# Patient Record
Sex: FEMALE | Race: BLACK OR AFRICAN AMERICAN | Employment: PART TIME | URBAN - METROPOLITAN AREA
[De-identification: names, ages, dates, MRNs, and addresses within clinical notes are randomized per-mention and may not be internally consistent; named-entity substitution may affect disease eponyms.]

---

## 2017-01-09 ENCOUNTER — HOSPITAL ENCOUNTER (OUTPATIENT)
Dept: MAMMOGRAPHY | Age: 67
Discharge: HOME | End: 2017-01-09
Attending: FAMILY MEDICINE
Payer: MEDICARE

## 2017-01-09 ENCOUNTER — HOSPITAL ENCOUNTER (OUTPATIENT)
Dept: ULTRASOUND IMAGING | Age: 67
Discharge: HOME | End: 2017-01-09
Attending: FAMILY MEDICINE
Payer: MEDICARE

## 2017-01-09 DIAGNOSIS — R92.8 ABNORMAL MAMMOGRAM: ICD-10-CM

## 2017-01-09 PROCEDURE — 77065 DX MAMMO INCL CAD UNI: CPT

## 2017-01-09 PROCEDURE — 76642 ULTRASOUND BREAST LIMITED: CPT

## 2017-01-11 ENCOUNTER — TELEPHONE (OUTPATIENT)
Dept: FAMILY MEDICINE CLINIC | Age: 67
End: 2017-01-11

## 2017-01-11 ENCOUNTER — OFFICE VISIT (OUTPATIENT)
Dept: FAMILY MEDICINE CLINIC | Age: 67
End: 2017-01-11

## 2017-01-11 VITALS
DIASTOLIC BLOOD PRESSURE: 90 MMHG | OXYGEN SATURATION: 99 % | BODY MASS INDEX: 26.4 KG/M2 | HEART RATE: 79 BPM | SYSTOLIC BLOOD PRESSURE: 144 MMHG | RESPIRATION RATE: 16 BRPM | HEIGHT: 63 IN | TEMPERATURE: 98 F | WEIGHT: 149 LBS

## 2017-01-11 DIAGNOSIS — T78.3XXD ANGIOEDEMA OF LIPS, SUBSEQUENT ENCOUNTER: Primary | ICD-10-CM

## 2017-01-11 RX ORDER — METOPROLOL SUCCINATE 25 MG/1
25 TABLET, EXTENDED RELEASE ORAL DAILY
Qty: 30 TAB | Refills: 2 | Status: SHIPPED | OUTPATIENT
Start: 2017-01-11 | End: 2017-04-27 | Stop reason: SDUPTHER

## 2017-01-11 RX ORDER — PREDNISONE 20 MG/1
60 TABLET ORAL
Qty: 15 TAB | Refills: 0 | Status: SHIPPED | OUTPATIENT
Start: 2017-01-11 | End: 2017-01-16

## 2017-01-11 NOTE — TELEPHONE ENCOUNTER
Pt states her lip is swollen and she has a rash. And she is reacting to Norvasc. Pt requesting medication change. Pt denies SOB or swollen tongue. States she has taken claritin  for reaction. Benadryl makes her sleepy.  Appt schedule today

## 2017-01-11 NOTE — PATIENT INSTRUCTIONS
Angioedema: Care Instructions  Your Care Instructions  Angioedema is an allergic reaction. It causes swelling and welts in the deep layers of the skin. Angioedema can sometimes occur along with hives. Hives are an allergic reaction in the outer layers of the skin. Angioedema can range from mild to severe. Painful welts can develop on the face. Angioedema can also occur on other parts of the body. In severe cases, the inside of the throat can swell and make it hard to breathe. Many things can cause this condition, including foods, insect bites, and medicines (such as aspirin and some blood pressure medicines). It also can run in families. Sometimes you may know what caused the reaction, but other times you may not know. Follow-up care is a key part of your treatment and safety. Be sure to make and go to all appointments, and call your doctor if you are having problems. It's also a good idea to know your test results and keep a list of the medicines you take. How can you care for yourself at home? · Take your medicines exactly as prescribed. Call your doctor if you think you are having a problem with your medicine. You will get more details on the specific medicines your doctor prescribes. Some medicines used to treat angioedema can make you too sleepy to drive safely. Do not drive if you take medicine that may make you sleepy. · Avoid foods or medicine that may have triggered the swelling. · For comfort:  ¨ Try taking a cool bath. Or place a cool, wet towel on the swollen area. ¨ Avoid hot baths and showers. ¨ Wear loose clothing. · Your doctor may prescribe a shot of epinephrine to carry with you in case you have a severe reaction. Learn how to give yourself the shot and keep it with you at all times. Make sure it has not . When should you call for help? Give an epinephrine shot if:  · You think you are having a severe allergic reaction.   · You have symptoms in more than one body area, such as mild nausea and an itchy mouth. After giving an epinephrine shot call 911, even if you feel better. Call 911 if:  · You have symptoms of a severe allergic reaction. These may include:  ¨ Sudden raised, red areas (hives) all over your body. ¨ Swelling of the throat, mouth, lips, or tongue. ¨ Trouble breathing. ¨ Passing out (losing consciousness). Or you may feel very lightheaded or suddenly feel weak, confused, or restless. · You have been given an epinephrine shot, even if you feel better. Call your doctor now or seek immediate medical care if:  · You have symptoms of an allergic reaction, such as:  ¨ A rash or hives (raised, red areas on the skin). ¨ Itching. ¨ Swelling. ¨ Belly pain, nausea, or vomiting. Watch closely for changes in your health, and be sure to contact your doctor if:  · You do not get better as expected. Where can you learn more? Go to http://charles-kristin.info/. Enter P166 in the search box to learn more about \"Angioedema: Care Instructions. \"  Current as of: February 12, 2016  Content Version: 11.1  © 8986-4752 [employer]. Care instructions adapted under license by [employer] (which disclaims liability or warranty for this information). If you have questions about a medical condition or this instruction, always ask your healthcare professional. [address] any warranty or liability for your use of this information.

## 2017-01-11 NOTE — PROGRESS NOTES
Amanda Duckworth  77 y.o. female  1950  [address] [facility]  [id number]   [address]: Progress Note  Ngozi Ramirez MD       Encounter Date: 1/11/2017    Chief Complaint   Patient presents with    Allergic Reaction    Lip Swelling     History of Present Illness   Amanda Duckworth is a 77 y.o. female who presents to clinic today for follow-up from last appointment. Had developed swelling of lip after swtich from brand to generic Benicar. Stopped the Benicar and started Norvasc. No improvement in swelling over that time, infact, maybe a little worse. Denies wheezing, coughing, dyspnea, edema of tongue, blisters, etc.    Taking 10mg loratidine daily  Review of Systems   Review of Systems   Constitutional: Negative for fever. HENT:        Lip swelling   Respiratory: Negative for cough, sputum production, shortness of breath and wheezing. Endo/Heme/Allergies: Positive for environmental allergies. All other systems reviewed and are negative. Vitals/Objective:     Vitals:    01/11/17 1406   BP: 144/90   Pulse: 79   Resp: 16   Temp: 98 °F (36.7 °C)   TempSrc: Oral   SpO2: 99%   Weight: 149 lb (67.6 kg)   Height: 5' 3\" (1.6 m)     Body mass index is 26.39 kg/(m^2). Physical Exam   HENT:   Right Ear: Tympanic membrane normal.   Left Ear: Tympanic membrane normal.   Nose: Nose normal.   Lower lip edema   Cardiovascular: Normal rate, regular rhythm, normal heart sounds and normal pulses. PMI is not displaced. Exam reveals no gallop. No murmur heard. Pulmonary/Chest: Effort normal and breath sounds normal. No accessory muscle usage. No respiratory distress. She has no decreased breath sounds. She has no wheezes. She has no rhonchi. Assessment and Plan:   1. Angioedema of lips, subsequent encounter  Increase loratidine to 20mg, take prednisone burst and stop norvasc. Start toprol XL. STOP all ASA or NSAIDS.   Refer to allergist due to persisent angioedema. May need to rule o  - metoprolol succinate (TOPROL-XL) 25 mg XL tablet; Take 1 Tab by mouth daily. Dispense: 30 Tab; Refill: 2  - predniSONE (DELTASONE) 20 mg tablet; Take 3 Tabs by mouth daily (with breakfast) for 5 days. Dispense: 15 Tab; Refill: 0  - REFERRAL TO ALLERGY    I have discussed the diagnosis with the patient and the intended plan as seen in the above orders. she has expressed understanding. The patient has received an after-visit summary and questions were answered concerning future plans. I have discussed medication side effects and warnings with the patient as well. Follow-up Disposition:  Return in about 1 week (around 1/18/2017), or if symptoms worsen or fail to improve, for Follow-up on Angioedema. Electronically Signed: Chaka Milligan MD     History   Patients past medical, surgical and family histories were reviewed and updated. Past Medical History   Diagnosis Date    Diabetes ([facility] [address]     Gallstone     Hypertension      Past Surgical History   Procedure Laterality Date    Hx gyn  1984     hysterectomy - Fibroids     Family History   Problem Relation Age of Onset    Bleeding Prob Mother      Anyerism- Brain    Diabetes Father     Other Father      hep C    Stroke Sister     No Known Problems Brother     No Known Problems Brother     No Known Problems Brother     No Known Problems Brother     No Known Problems Brother     Lupus Sister     No Known Problems Sister     No Known Problems Sister     No Known Problems Sister     Migraines Daughter     No Known Problems Daughter      Social History     Social History    Marital status:      Spouse name: N/A    Number of children: N/A    Years of education: N/A     Occupational History    Not on file.      Social History Main Topics    Smoking status: Never Smoker    Smokeless tobacco: Not on file    Alcohol use No    Drug use: No    Sexual activity: Not Currently     Birth control/ protection: Surgical     Other Topics Concern    Not on file     Social History Narrative            Current Medications/Allergies     Current Outpatient Prescriptions   Medication Sig Dispense Refill    amLODIPine (NORVASC) 5 mg tablet Take 1 Tab by mouth daily. 30 Tab 1    metFORMIN (GLUCOPHAGE) 500 mg tablet Take 1 Tab by mouth two (2) times daily (with meals). 60 Tab 1    olmesartan (BENICAR) 40 mg tablet Take  by mouth daily.        Allergies   Allergen Reactions    Penicillins Rash and Swelling

## 2017-01-11 NOTE — LETTER
1/12/2017 1:44 PM 
 
RE:    Jason Au  
[address] [facility] [phone] Thank you for agreeing to see Rita Dallas I am referring my patient to you for evaluation of continued angioedema despite stopping ARB. Clinically stable. . Please see her pertinent patient information below. Problem List:    
Patient Active Problem List  
 Diagnosis Date Noted  Essential hypertension 11/07/2016  Controlled type 2 diabetes mellitus without complication, without long-term current use of insulin ([facility] 11/07/2016 Medical History:    
Past Medical History Diagnosis Date  Diabetes ([facility]  Gallstone  Hypertension Allergies: Allergies Allergen Reactions  Penicillins Rash and Swelling Medications:    
Current Outpatient Prescriptions Medication Sig  
 metoprolol succinate (TOPROL-XL) 25 mg XL tablet Take 1 Tab by mouth daily.  predniSONE (DELTASONE) 20 mg tablet Take 3 Tabs by mouth daily (with breakfast) for 5 days. No current facility-administered medications for this visit. I appreciate your assistance in Ms. Sawant's care  and look forward to your findings and recommendations.  
 
 
 
Sincerely, 
 
 
Tyra Romero MD

## 2017-01-25 ENCOUNTER — HOSPITAL ENCOUNTER (OUTPATIENT)
Dept: MAMMOGRAPHY | Age: 67
Discharge: HOME | End: 2017-01-25
Attending: RADIOLOGY
Payer: MEDICARE

## 2017-01-25 ENCOUNTER — TELEPHONE (OUTPATIENT)
Dept: FAMILY MEDICINE CLINIC | Age: 67
End: 2017-01-25

## 2017-01-25 ENCOUNTER — HOSPITAL ENCOUNTER (OUTPATIENT)
Dept: ULTRASOUND IMAGING | Age: 67
Discharge: HOME | End: 2017-01-25
Attending: FAMILY MEDICINE
Payer: MEDICARE

## 2017-01-25 DIAGNOSIS — N63.0 BREAST MASS: ICD-10-CM

## 2017-01-25 DIAGNOSIS — N63.0 BREAST MASS: Primary | ICD-10-CM

## 2017-01-25 PROCEDURE — 88360 TUMOR IMMUNOHISTOCHEM/MANUAL: CPT | Performed by: RADIOLOGY

## 2017-01-25 PROCEDURE — 19083 BX BREAST 1ST LESION US IMAG: CPT

## 2017-01-25 PROCEDURE — 77030003666 HC NDL SPINAL BD -A

## 2017-01-25 PROCEDURE — 88305 TISSUE EXAM BY PATHOLOGIST: CPT | Performed by: RADIOLOGY

## 2017-01-25 PROCEDURE — 77030010507 HC ADH SKN DERMBND J&J -B

## 2017-01-25 PROCEDURE — 77030003497 HC NDL BIOP TISS BARD -B

## 2017-01-25 PROCEDURE — 77030003492

## 2017-01-25 PROCEDURE — 88365 INSITU HYBRIDIZATION (FISH): CPT | Performed by: RADIOLOGY

## 2017-01-25 PROCEDURE — 77065 DX MAMMO INCL CAD UNI: CPT

## 2017-01-25 PROCEDURE — 74011000250 HC RX REV CODE- 250: Performed by: RADIOLOGY

## 2017-01-25 RX ORDER — LIDOCAINE HYDROCHLORIDE AND EPINEPHRINE 10; 10 MG/ML; UG/ML
1.5 INJECTION, SOLUTION INFILTRATION; PERINEURAL ONCE
Status: COMPLETED | OUTPATIENT
Start: 2017-01-25 | End: 2017-01-25

## 2017-01-25 RX ADMIN — LIDOCAINE HYDROCHLORIDE,EPINEPHRINE BITARTRATE 10 MG: 10; .01 INJECTION, SOLUTION INFILTRATION; PERINEURAL at 09:45

## 2017-01-25 RX ADMIN — SODIUM BICARBONATE 4 ML: 0.2 INJECTION, SOLUTION INTRAVENOUS at 09:43

## 2017-01-25 NOTE — TELEPHONE ENCOUNTER
Call transferred from [address] [place] [address] [place]:    Kristi Back from Ultrasound Dept at [facility] called to say he needs an order as the appointment of today was scheduled verbally from the office. Nurse supervisor took the call.

## 2017-01-30 ENCOUNTER — TELEPHONE (OUTPATIENT)
Dept: FAMILY MEDICINE CLINIC | Age: 67
End: 2017-01-30

## 2017-01-30 NOTE — TELEPHONE ENCOUNTER
Please notify the patient that I have the results of her breast biopsy results and request she schedule an appointment this week to review them.

## 2017-01-31 ENCOUNTER — TELEPHONE (OUTPATIENT)
Dept: FAMILY MEDICINE CLINIC | Age: 67
End: 2017-01-31

## 2017-01-31 ENCOUNTER — OFFICE VISIT (OUTPATIENT)
Dept: FAMILY MEDICINE CLINIC | Age: 67
End: 2017-01-31

## 2017-01-31 VITALS
TEMPERATURE: 98.7 F | BODY MASS INDEX: 26.75 KG/M2 | WEIGHT: 151 LBS | SYSTOLIC BLOOD PRESSURE: 154 MMHG | DIASTOLIC BLOOD PRESSURE: 78 MMHG | HEIGHT: 63 IN | OXYGEN SATURATION: 98 % | HEART RATE: 81 BPM

## 2017-01-31 DIAGNOSIS — C50.919 INVASIVE CARCINOMA OF BREAST (HCC): Primary | ICD-10-CM

## 2017-01-31 DIAGNOSIS — E11.9 TYPE 2 DIABETES MELLITUS WITHOUT COMPLICATION, WITHOUT LONG-TERM CURRENT USE OF INSULIN (HCC): ICD-10-CM

## 2017-01-31 DIAGNOSIS — I10 ESSENTIAL HYPERTENSION: ICD-10-CM

## 2017-01-31 RX ORDER — METFORMIN HYDROCHLORIDE 500 MG/1
TABLET ORAL 2 TIMES DAILY WITH MEALS
COMMUNITY
End: 2017-02-06 | Stop reason: SDUPTHER

## 2017-01-31 NOTE — PROGRESS NOTES
Chief Complaint   Patient presents with    Elevated Blood Pressure     1. Have you been to the ER, urgent care clinic since your last visit? Hospitalized since your last visit? No    2. Have you seen or consulted any other health care providers outside of the [facility] since your last visit? Include any pap smears or colon screening.   mammogram--biopsy done last week

## 2017-01-31 NOTE — PATIENT INSTRUCTIONS
Breast Cancer: Care Instructions  Your Care Instructions  Breast cancer occurs when abnormal cells grow out of control in the breast. These cancer cells can spread within the breast, to nearby lymph nodes and other tissues, and to other parts of the body. Being treated for cancer can weaken your body, and you may feel very tired. Get the rest your body needs so you can feel better. Finding out that you have cancer is scary. You may feel many emotions and may need some help coping. Seek out family, friends, and counselors for support. You also can do things at home to make yourself feel better while you go through treatment. Call the [employer] (9-616.999.2332) or visit its website at [employer] for more information. Follow-up care is a key part of your treatment and safety. Be sure to make and go to all appointments, and call your doctor if you are having problems. It's also a good idea to know your test results and keep a list of the medicines you take. How can you care for yourself at home? · Take your medicines exactly as prescribed. Call your doctor if you think you are having a problem with your medicine. You may get medicine for nausea and vomiting if you have these side effects. · Follow your doctor's instructions to relieve pain. Pain from cancer and surgery can almost always be controlled. Use pain medicine when you first notice pain, before it becomes severe. · Eat healthy food. If you do not feel like eating, try to eat food that has protein and extra calories to keep up your strength and prevent weight loss. Drink liquid meal replacements for extra calories and protein. Try to eat your main meal early. · Get some physical activity every day, but do not get too tired. Keep doing the hobbies you enjoy as your energy allows. · Do not smoke. Smoking can make your cancer worse. If you need help quitting, talk to your doctor about stop-smoking programs and medicines.  These can increase your chances of quitting for good. · Take steps to control your stress and workload. Learn relaxation techniques. ¨ Share your feelings. Stress and tension affect our emotions. By expressing your feelings to others, you may be able to understand and cope with them. ¨ Consider joining a support group. Talking about a problem with your spouse, a good friend, or other people with similar problems is a good way to reduce tension and stress. ¨ Express yourself through art. Try writing, crafts, dance, or art to relieve stress. Some dance, writing, or art groups may be available just for people who have cancer. ¨ Be kind to your body and mind. Getting enough sleep, eating a healthy diet, and taking time to do things you enjoy can contribute to an overall feeling of balance in your life and can help reduce stress. ¨ Get help if you need it. Discuss your concerns with your doctor or counselor. · If you are vomiting or have diarrhea:  ¨ Drink plenty of fluids (enough so that your urine is light yellow or clear like water) to prevent dehydration. Choose water and other caffeine-free clear liquids. If you have kidney, heart, or liver disease and have to limit fluids, talk with your doctor before you increase the amount of fluids you drink. ¨ When you are able to eat, try clear soups, mild foods, and liquids until all symptoms are gone for 12 to 48 hours. Other good choices include dry toast, crackers, cooked cereal, and gelatin dessert, such as Jell-O.  · If you have not already done so, prepare a list of advance directives. Advance directives are instructions to your doctor and family members about what kind of care you want if you become unable to speak or express yourself. When should you call for help? Call your doctor now or seek immediate medical care if:  · You have a fever. · Any part of your breast becomes red, tender, swollen, or hot.   · You have pain, redness, or swelling in the arm on the same side as your breast cancer. Watch closely for changes in your health, and be sure to contact your doctor if:  · You have pain that is not controlled by medicine. · You have nausea or vomiting. · You are constipated or have diarrhea. Where can you learn more? Go to http://charles-kristin.info/. Enter V321 in the search box to learn more about \"Breast Cancer: Care Instructions. \"  Current as of: July 26, 2016  Content Version: 11.1  © 0935-4010 "[employer]. Care instructions adapted under license by [employer] (which disclaims liability or warranty for this information). If you have questions about a medical condition or this instruction, always ask your healthcare professional. [address] any warranty or liability for your use of this information. Learning About Breast Cancer Treatments  Your Care Instructions  Breast cancer means abnormal cells grow out of control in one or both breasts. These cancer cells can spread from the breast to nearby lymph nodes and other tissues. They can also spread to other parts of the body. The type and stage of cancer you have is based on:  · Where in the breast the cancer started. · The genetics of those cancer cells. · How far cancer has spread within the breast, to nearby tissues, or to other organs. · What the cancer cells look like under a microscope. · Whether there is cancer in the nearby lymph nodes. Tests that help find the stage of your cancer can help choose the right treatment for you. These tests can include a breast biopsy, lymph node biopsy, blood tests, and X-rays. You may need other tests as well, such as a bone, CT, or MRI scan. Whether you have more tests depends on your symptoms and the stage of the cancer. When you find out that you have cancer, you may feel many emotions and may need some help coping. Seek out family, friends, and counselors for support.  You also can do things at home to make yourself feel better while you go through treatment. Call the [facility] (7-688.619.9944) or visit its website at [employer] for more information. Follow-up care is a key part of your treatment and safety. Be sure to make and go to all appointments, and call your doctor if you are having problems. It's also a good idea to know your test results and keep a list of the medicines you take. How is breast cancer treated? Your doctor may combine treatments. This is a common way to treat breast cancer. Treatment depends on what type and stage of cancer you have. You may have:  · Surgery to remove the cancer. · Radiation. This uses high-dose X-rays to kill cancer cells and shrink tumors. · Chemotherapy. This uses medicine to kill cancer cells. · Hormone therapy. This uses medicines such as tamoxifen. It limits the effect of the hormone estrogen. This hormone can help some types of breast cancer cells to grow. · Biological therapy. This uses medicines such as trastuzumab (Herceptin) to help your immune system fight the cancer. What surgeries are done for breast cancer? Surgery is a key part of treatment for breast cancer. The main types of surgeries are:  · Breast-conserving surgery. This is surgery that does not remove the whole breast. This includes:  ¨ Lumpectomy. This surgery removes the cancer in the breast and some of the normal tissue around it. ¨ Partial mastectomy. This surgery removes part of the breast. The lining of the chest muscles below the cancer and some of the lymph nodes may also be removed. · Surgery to remove the breast. This includes:  ¨ Total mastectomy. This removes the whole breast.  ¨ Nipple-sparing mastectomy. This removes the whole breast but leaves the nipple. ¨ Modified radical mastectomy. This removes the whole breast and the lymph nodes under the arm (axillary lymph nodes). ¨ Radical mastectomy.  This removes the whole breast, the chest muscles, and all the lymph nodes under the arm. If lymph nodes under the arm are removed, they are looked at under the microscope to check for cancer. There are two types of lymph node removal:  · [place] lymph node biopsy removes the lymph node that is the first to receive lymph fluid from the tumor. If cancer has spread from the breast to the lymph nodes, cancer cells most often show up in the sentinel node first.  · Axillary lymph node dissection removes most of the lymph nodes in the armpit. The type of surgery you have depends on the size, location, and type of the cancer. It also depends on your overall health and personal preferences. Even if your doctor removes all the cancer that can be seen at the time of your surgery, you may still need more treatment. You may get radiation, chemotherapy, or hormone therapy after surgery to try to kill any cancer cells that may be left. No matter what kind of surgery you have, you will get information about why you are having it, what its risks are, how to prepare, and what to do after surgery. Where can you learn more? Go to http://charles-kristin.info/. Enter X810 in the search box to learn more about \"Learning About Breast Cancer Treatments. \"  Current as of: July 26, 2016  Content Version: 11.1  © 7535-1131 [employer]. Care instructions adapted under license by [employer] (which disclaims liability or warranty for this information). If you have questions about a medical condition or this instruction, always ask your healthcare professional. [place] [address] any warranty or liability for your use of this information.

## 2017-01-31 NOTE — MR AVS SNAPSHOT
Visit Information Date & Time Provider Department Dept. Phone Encounter #  
 1/31/2017  2:40 PM Marlin Ibarra, Rob De Guzman [phone] [id number] Follow-up Instructions Return if symptoms worsen or fail to improve. Upcoming Health Maintenance Date Due Hepatitis C Screening 1950 FOOT EXAM Q1 8/15/1960 EYE EXAM RETINAL OR DILATED Q1 8/15/1960 DTaP/Tdap/Td series (1 - Tdap) 8/15/1971 FOBT Q 1 YEAR AGE 50-75 8/15/2000 ZOSTER VACCINE AGE 60> 8/15/2010 GLAUCOMA SCREENING Q2Y 8/15/2015 OSTEOPOROSIS SCREENING (DEXA) 8/15/2015 MEDICARE YEARLY EXAM 8/15/2015 HEMOGLOBIN A1C Q6M 5/7/2017 MICROALBUMIN Q1 11/7/2017 LIPID PANEL Q1 11/7/2017 Pneumococcal 65+ Low/Medium Risk (2 of 2 - PPSV23) 11/18/2017 BREAST CANCER SCRN MAMMOGRAM 1/9/2019 Allergies as of 1/31/2017  Review Complete On: 1/31/2017 By: Marlin Ibarra MD  
  
 Severity Noted Reaction Type Reactions Penicillins  11/07/2016   Systemic Rash, Swelling Current Immunizations  Never Reviewed Name Date Influenza High Dose Vaccine PF 11/7/2016 Not reviewed this visit You Were Diagnosed With   
  
 Codes Comments Invasive carcinoma of breast ([facility]    -  Primary ICD-10-CM: O48.379 ICD-9-CM: 174.9 Type 2 diabetes mellitus without complication, without long-term current use of insulin (HCC)     ICD-10-CM: E11.9 ICD-9-CM: 250.00 Essential hypertension     ICD-10-CM: I10 
ICD-9-CM: 401.9 Vitals BP Pulse Temp Height(growth percentile) Weight(growth percentile) SpO2  
 154/78 81 98.7 °F (37.1 °C) (Oral) 5' 3\" (1.6 m) 151 lb (68.5 kg) 98% BMI OB Status Smoking Status 26.75 kg/m2 Hysterectomy Never Smoker BMI and BSA Data Body Mass Index Body Surface Area  
 26.75 kg/m 2 1.74 m 2 Preferred Pharmacy Pharmacy Name Phone [id number]- [place], [address] [facility] [phone] Your Updated Medication List  
  
   
This list is accurate as of: 1/31/17  3:03 PM.  Always use your most recent med list.  
  
  
  
  
 metFORMIN 500 mg tablet Commonly known as:  GLUCOPHAGE Take  by mouth two (2) times daily (with meals). metoprolol succinate 25 mg XL tablet Commonly known as:  TOPROL-XL Take 1 Tab by mouth daily. We Performed the Following REFERRAL TO BREAST SURGERY [REF10 Custom] Comments:  
 Please evaluate patient for invasive mammary carcinoma Follow-up Instructions Return if symptoms worsen or fail to improve. Referral Information Referral ID Referred By Referred To  
  
 [id number] Florencio Galdamez MD [address] Phone: [phone] Fax: [phone] Visits Status Start Date End Date 1 New Request 1/31/17 1/31/18 If your referral has a status of pending review or denied, additional information will be sent to support the outcome of this decision. Patient Instructions Breast Cancer: Care Instructions Your Care Instructions Breast cancer occurs when abnormal cells grow out of control in the breast. These cancer cells can spread within the breast, to nearby lymph nodes and other tissues, and to other parts of the body. Being treated for cancer can weaken your body, and you may feel very tired. Get the rest your body needs so you can feel better. Finding out that you have cancer is scary. You may feel many emotions and may need some help coping. Seek out family, friends, and counselors for support. You also can do things at home to make yourself feel better while you go through treatment.  Call the [address] ([phone]) or visit its website at [employer] [employer]. org for more information. Follow-up care is a key part of your treatment and safety. Be sure to make and go to all appointments, and call your doctor if you are having problems. It's also a good idea to know your test results and keep a list of the medicines you take. How can you care for yourself at home? · Take your medicines exactly as prescribed. Call your doctor if you think you are having a problem with your medicine. You may get medicine for nausea and vomiting if you have these side effects. · Follow your doctor's instructions to relieve pain. Pain from cancer and surgery can almost always be controlled. Use pain medicine when you first notice pain, before it becomes severe. · Eat healthy food. If you do not feel like eating, try to eat food that has protein and extra calories to keep up your strength and prevent weight loss. Drink liquid meal replacements for extra calories and protein. Try to eat your main meal early. · Get some physical activity every day, but do not get too tired. Keep doing the hobbies you enjoy as your energy allows. · Do not smoke. Smoking can make your cancer worse. If you need help quitting, talk to your doctor about stop-smoking programs and medicines. These can increase your chances of quitting for good. · Take steps to control your stress and workload. Learn relaxation techniques. ¨ Share your feelings. Stress and tension affect our emotions. By expressing your feelings to others, you may be able to understand and cope with them. ¨ Consider joining a support group. Talking about a problem with your spouse, a good friend, or other people with similar problems is a good way to reduce tension and stress. ¨ Express yourself through art. Try writing, crafts, dance, or art to relieve stress. Some dance, writing, or art groups may be available just for people who have cancer. ¨ Be kind to your body and mind. Getting enough sleep, eating a healthy diet, and taking time to do things you enjoy can contribute to an overall feeling of balance in your life and can help reduce stress. ¨ Get help if you need it. Discuss your concerns with your doctor or counselor. · If you are vomiting or have diarrhea: ¨ Drink plenty of fluids (enough so that your urine is light yellow or clear like water) to prevent dehydration. Choose water and other caffeine-free clear liquids. If you have kidney, heart, or liver disease and have to limit fluids, talk with your doctor before you increase the amount of fluids you drink. ¨ When you are able to eat, try clear soups, mild foods, and liquids until all symptoms are gone for 12 to 48 hours. Other good choices include dry toast, crackers, cooked cereal, and gelatin dessert, such as Jell-O. · If you have not already done so, prepare a list of advance directives. Advance directives are instructions to your doctor and family members about what kind of care you want if you become unable to speak or express yourself. When should you call for help? Call your doctor now or seek immediate medical care if: 
· You have a fever. · Any part of your breast becomes red, tender, swollen, or hot. · You have pain, redness, or swelling in the arm on the same side as your breast cancer. Watch closely for changes in your health, and be sure to contact your doctor if: 
· You have pain that is not controlled by medicine. · You have nausea or vomiting. · You are constipated or have diarrhea. Where can you learn more? Go to http://charles-kristin.info/. Enter V321 in the search box to learn more about \"Breast Cancer: Care Instructions. \" Current as of: July 26, 2016 Content Version: 11.1 © 4504-3882 [employer].  Care instructions adapted under license by [employer] (which disclaims liability or warranty for this information). If you have questions about a medical condition or this instruction, always ask your healthcare professional. [address] any warranty or liability for your use of this information. Learning About Breast Cancer Treatments Your Care Instructions Breast cancer means abnormal cells grow out of control in one or both breasts. These cancer cells can spread from the breast to nearby lymph nodes and other tissues. They can also spread to other parts of the body. The type and stage of cancer you have is based on: · Where in the breast the cancer started. · The genetics of those cancer cells. · How far cancer has spread within the breast, to nearby tissues, or to other organs. · What the cancer cells look like under a microscope. · Whether there is cancer in the nearby lymph nodes. Tests that help find the stage of your cancer can help choose the right treatment for you. These tests can include a breast biopsy, lymph node biopsy, blood tests, and X-rays. You may need other tests as well, such as a bone, CT, or MRI scan. Whether you have more tests depends on your symptoms and the stage of the cancer. When you find out that you have cancer, you may feel many emotions and may need some help coping. Seek out family, friends, and counselors for support. You also can do things at home to make yourself feel better while you go through treatment. Call the [employer] ([phone]) or visit its website at [employer] [employer] for more information. Follow-up care is a key part of your treatment and safety. Be sure to make and go to all appointments, and call your doctor if you are having problems. It's also a good idea to know your test results and keep a list of the medicines you take. How is breast cancer treated? Your doctor may combine treatments. This is a common way to treat breast cancer. Treatment depends on what type and stage of cancer you have.  You may have: · Surgery to remove the cancer. · Radiation. This uses high-dose X-rays to kill cancer cells and shrink tumors. · Chemotherapy. This uses medicine to kill cancer cells. · Hormone therapy. This uses medicines such as tamoxifen. It limits the effect of the hormone estrogen. This hormone can help some types of breast cancer cells to grow. · Biological therapy. This uses medicines such as trastuzumab (Herceptin) to help your immune system fight the cancer. What surgeries are done for breast cancer? Surgery is a key part of treatment for breast cancer. The main types of surgeries are: · Breast-conserving surgery. This is surgery that does not remove the whole breast. This includes: 
¨ Lumpectomy. This surgery removes the cancer in the breast and some of the normal tissue around it. ¨ Partial mastectomy. This surgery removes part of the breast. The lining of the chest muscles below the cancer and some of the lymph nodes may also be removed. · Surgery to remove the breast. This includes: ¨ Total mastectomy. This removes the whole breast. 
¨ Nipple-sparing mastectomy. This removes the whole breast but leaves the nipple. ¨ Modified radical mastectomy. This removes the whole breast and the lymph nodes under the arm (axillary lymph nodes). ¨ Radical mastectomy. This removes the whole breast, the chest muscles, and all the lymph nodes under the arm. If lymph nodes under the arm are removed, they are looked at under the microscope to check for cancer. There are two types of lymph node removal: · Mid[place] lymph node biopsy removes the lymph node that is the first to receive lymph fluid from the tumor. If cancer has spread from the breast to the lymph nodes, cancer cells most often show up in the sentinel node first. 
· Axillary lymph node dissection removes most of the lymph nodes in the armpit.  
The type of surgery you have depends on the size, location, and type of the cancer. It also depends on your overall health and personal preferences. Even if your doctor removes all the cancer that can be seen at the time of your surgery, you may still need more treatment. You may get radiation, chemotherapy, or hormone therapy after surgery to try to kill any cancer cells that may be left. No matter what kind of surgery you have, you will get information about why you are having it, what its risks are, how to prepare, and what to do after surgery. Where can you learn more? Go to http://charles-kristin.info/. Enter X810 in the search box to learn more about \"Learning About Breast Cancer Treatments. \" Current as of: July 26, 2016 Content Version: 11.1 © 0906-8966 [employer]. Care instructions adapted under license by [employer] (which disclaims liability or warranty for this information). If you have questions about a medical condition or this instruction, always ask your healthcare professional. [address] any warranty or liability for your use of this information. Please provide this summary of care documentation to your next provider. Your primary care clinician is listed as Gemini Elise. If you have any questions after today's visit, please call [phone].

## 2017-01-31 NOTE — LETTER
1/31/2017 5:24 PM 
 
RE:    Aicha Card  
[address] [facility] [address] Thank you for agreeing to see Brandyn Mujica I am referring my patient to you for evaluation of invasive mammary carcinoma of the left breast. Please see her pertinent patient information below. Problem List:    
Patient Active Problem List  
 Diagnosis Date Noted  Invasive carcinoma of breast ([facility] 01/31/2017  Essential hypertension 11/07/2016  Controlled type 2 diabetes mellitus without complication, without long-term current use of insulin ([facility] 11/07/2016 Allergies: Allergies Allergen Reactions  Penicillins Rash and Swelling Medications:    
Current Outpatient Prescriptions Medication Sig  
 metFORMIN (GLUCOPHAGE) 500 mg tablet Take  by mouth two (2) times daily (with meals).  metoprolol succinate (TOPROL-XL) 25 mg XL tablet Take 1 Tab by mouth daily. No current facility-administered medications for this visit. Surgical History:    
Past Surgical History Procedure Laterality Date  Hx gyn  1984  
  hysterectomy - Fibroids I appreciate your assistance in Ms. Sawant's care  and look forward to your findings and recommendations.  
 
 
 
Sincerely, 
 
 
Angel Pantoja MD

## 2017-01-31 NOTE — LETTER
1/31/2017 2:53 PM 
 
[facility] 
[address] [place] [address] [address] [facility]: 
 
Please find your most recent results below. Resulted Orders US BX BREAST LT 1ST LESION W/CLIP AND SPECIMEN Addendum: 1/30/2017 Addendum:  
 
PATHOLOGY CORRELATION 
 
INDICATION: Patient recently underwent core needle biopsy of the left breast. 
 
FINDINGS: The pathology report reveals: 1. Left breast, sample 1, core biopsies Invasive mammary carcinoma with lobular features. 2. 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 IMPRESSION: This is a concordant result. The patient elected to hear results 
from her primary physician. I am available for further questions as needed. RECOMMENDATION: Surgical excision. Referral to a subspecialist breast surgeon. Further, breast MRI is recommended to evaluate extent of disease and the 
possibility of contralateral disease. 23X: Please fax to [facility]. Narrative ULTRASOUND-GUIDED LEFT BREAST BIOPSY (TWO SITES) INDICATION: Small hypoechoic masses in the left breast. 
 
PROCEDURE: The patient's relevant allergies and medications were reviewed. The procedure, 
benefits, risks, and alternatives (including not having the procedure) were 
discussed. All of the patient's questions were answered. Informed verbal and 
written consent was obtained. The mass at 12:00 in the left breast 4 cm from the nipple was localized with 
ultrasound. The skin site was marked. A time-out procedure using two patient 
identifiers was performed and laterality confirmed. The skin was prepped and 
draped using maximum sterile barrier. Using ultrasound guidance, subcutaneous 
and deep tissues were anesthetized with local anesthetic. Under continuous ultrasound visualization, 4 samples samples were obtained with a 14-gauge core 
biopsy device. A clip was placed at the biopsy site under direct ultrasound 
visualization. The mass at 1:00 in the left breast 4 cm from the nipple (and approximately 22-26 mm lateral to the 12:00 mass) was localized with ultrasound. The skin site 
was marked. The same surgical field (with maximum sterile barrier) was used. Additional local anesthetic was administered around the second mass through the 
same skin nick. Under continuous ultrasound visualization, 4 samples were 
obtained with a 14-gauge core biopsy device. A clip was placed at the biopsy 
site under direct ultrasound visualization. There were no immediate 
complications. The patient tolerated the procedure well. Post-biopsy digital mammogram confirms the presence of the clips at the intended 
biopsy sites. Final pathology is pending. Impression IMPRESSION:  
Successful two-site, ultrasound-guided, left breast biopsies. Further 
recommendations will be based on final pathology results. Please call me if you have any questions: [phone] Sincerely, 
 
 
Gucci Staples MD

## 2017-02-06 RX ORDER — METFORMIN HYDROCHLORIDE 500 MG/1
TABLET ORAL
Qty: 60 TAB | Refills: 1 | Status: SHIPPED | OUTPATIENT
Start: 2017-02-06 | End: 2017-04-17 | Stop reason: SDUPTHER

## 2017-02-18 ENCOUNTER — OFFICE VISIT (OUTPATIENT)
Dept: FAMILY MEDICINE CLINIC | Age: 67
End: 2017-02-18

## 2017-02-18 VITALS
HEIGHT: 63 IN | DIASTOLIC BLOOD PRESSURE: 81 MMHG | HEART RATE: 61 BPM | OXYGEN SATURATION: 99 % | TEMPERATURE: 98.2 F | RESPIRATION RATE: 18 BRPM | BODY MASS INDEX: 26.75 KG/M2 | WEIGHT: 151 LBS | SYSTOLIC BLOOD PRESSURE: 156 MMHG

## 2017-02-18 DIAGNOSIS — R06.02 SHORTNESS OF BREATH: ICD-10-CM

## 2017-02-18 DIAGNOSIS — J20.9 ACUTE BRONCHITIS, UNSPECIFIED ORGANISM: Primary | ICD-10-CM

## 2017-02-18 RX ORDER — AZITHROMYCIN 250 MG/1
TABLET, FILM COATED ORAL
Qty: 6 TAB | Refills: 0 | Status: SHIPPED | OUTPATIENT
Start: 2017-02-18 | End: 2017-02-23

## 2017-02-18 NOTE — PROGRESS NOTES
Subjective:      Adalberto Ovalles is a 77 y.o. female here with complaint of cough productive of white phlegm, chest congestion for 2-3 weeks. Reports that her chest congestion is worsening and she is hearing rattling in the chest. She has been feeling winded with exertion for the past few days. Associated with rhinorrhea. Did have postnasal drainage at the onset of her symptoms, but not currently. Denies fever, chills, orthopnea, nausea, vomiting, orthopnea, recent long travel. Evaluation to date: none  Treatment to date: Claritin, OTC cold medications     Current Outpatient Prescriptions   Medication Sig Dispense Refill    metFORMIN (GLUCOPHAGE) 500 mg tablet TAKE 1 TAB BY MOUTH TWO (2) TIMES DAILY (WITH MEALS). 60 Tab 1    metoprolol succinate (TOPROL-XL) 25 mg XL tablet Take 1 Tab by mouth daily. 30 Tab 2       Allergies   Allergen Reactions    Penicillins Rash and Swelling       Past Medical History   Diagnosis Date    Diabetes ([facility]     Gallstone     Hypertension     Invasive carcinoma of breast ([facility] 1/31/2017       Social History   Substance Use Topics    Smoking status: Never Smoker    Smokeless tobacco: Not on file    Alcohol use No        Review of Systems  Pertinent items are noted in HPI.      Objective:     Visit Vitals    /81    Pulse 61    Temp 98.2 °F (36.8 °C) (Oral)    Resp 18    Ht 5' 3\" (1.6 m)    Wt 151 lb (68.5 kg)    SpO2 99%    BMI 26.75 kg/m2      General appearance - alert, well appearing, and in no distress  Eyes - pupils equal and reactive, extraocular eye movements intact, sclera anicteric  Oropharyngx - mucous membranes moist, pharynx normal without lesions  Neck - supple, no significant adenopathy  Chest - rhonchi appreciable in the left upper lobe, no rales or wheezing, air movement good, respirations unlabored   Heart - normal rate, regular rhythm, normal S1, S2, no murmurs, rubs, clicks or gallops    XR Results (most recent):    Results from Appointment encounter on 02/18/17   XR CHEST PA LAT   Narrative Clinical indication: Shortness of breath and cough. Frontal and lateral views of the chest obtained, no prior. The heart size is  normal. There is no acute infiltrate. Impression impression: No acute infiltrate. Assessment/Plan:   Carter Johnson is a 77 y.o. female seen for:     1. Acute bronchitis, unspecified organism: CXR negative for pneumonia, O2 saturations on room air normal, and no respiratory distress. Treat as below. - azithromycin (ZITHROMAX) 250 mg tablet; Take 2 tablets today, then take 1 tablet daily  Dispense: 6 Tab; Refill: 0  - Mucinex as needed for congestion/cough, push fluids    2. Shortness of breath: CXR normal. Advised likely related to the above. If no improvement noted, advised to follow up with her PCP. - XR CHEST PA LAT; Future    I have discussed the diagnosis with the patient and the intended plan as seen in the above orders. The patient has received an after-visit summary and questions were answered concerning future plans. I have discussed medication side effects and warnings with the patient as well. Patient verbalizes understanding of plan of care and denies further questions or concerns at this time. Informed patient to return to the office if symptoms worsen or if new symptoms arise. Follow-up Disposition:  Return if symptoms worsen or fail to improve.

## 2017-02-18 NOTE — MR AVS SNAPSHOT
Visit Information Date & Time Provider Department Dept. Phone Encounter #  
 2/18/2017 10:30 AM Marga Savage, 1000 [facility] [phone] [id number] Follow-up Instructions Return if symptoms worsen or fail to improve. Your Appointments 3/31/2017  1:20 PM  
ROUTINE CARE with Gemini Elise MD  
1000 [address]al[address] De Guzman 3651 [facility]) Appt Note: BP check [address] 1007 [facility]  
[phone]  
  
   
 [address] Shivani Speak [address] Upcoming Health Maintenance Date Due Hepatitis C Screening 1950 FOOT EXAM Q1 8/15/1960 EYE EXAM RETINAL OR DILATED Q1 8/15/1960 DTaP/Tdap/Td series (1 - Tdap) 8/15/1971 FOBT Q 1 YEAR AGE 50-75 8/15/2000 ZOSTER VACCINE AGE 60> 8/15/2010 GLAUCOMA SCREENING Q2Y 8/15/2015 OSTEOPOROSIS SCREENING (DEXA) 8/15/2015 MEDICARE YEARLY EXAM 8/15/2015 HEMOGLOBIN A1C Q6M 5/7/2017 MICROALBUMIN Q1 11/7/2017 LIPID PANEL Q1 11/7/2017 Pneumococcal 65+ Low/Medium Risk (2 of 2 - PPSV23) 11/18/2017 BREAST CANCER SCRN MAMMOGRAM 1/9/2019 Allergies as of 2/18/2017  Review Complete On: 2/18/2017 By: Marga Savage MD  
  
 Severity Noted Reaction Type Reactions Penicillins  11/07/2016   Systemic Rash, Swelling Current Immunizations  Never Reviewed Name Date Influenza High Dose Vaccine PF 11/7/2016 Not reviewed this visit You Were Diagnosed With   
  
 Codes Comments Acute bronchitis, unspecified organism    -  Primary ICD-10-CM: J20.9 ICD-9-CM: 466.0 Shortness of breath     ICD-10-CM: R06.02 
ICD-9-CM: 786.05 Vitals BP Pulse Temp Resp Height(growth percentile) Weight(growth percentile) 156/81 61 98.2 °F (36.8 °C) (Oral) 18 5' 3\" (1.6 m) 151 lb (68.5 kg) SpO2 BMI OB Status Smoking Status 99% 26.75 kg/m2 Hysterectomy Never Smoker BMI and BSA Data Body Mass Index Body Surface Area  
 26.75 kg/m 2 1.74 m 2 Preferred Pharmacy Pharmacy Name Phone [facility]/PHARMACY #[id number] Calzada [address] [place] [address] Road [phone] Your Updated Medication List  
  
   
This list is accurate as of: 2/18/17 11:20 AM.  Always use your most recent med list.  
  
  
  
  
 azithromycin 250 mg tablet Commonly known as:  Franceen Ayala Take 2 tablets today, then take 1 tablet daily  
  
 metFORMIN 500 mg tablet Commonly known as:  GLUCOPHAGE  
TAKE 1 TAB BY MOUTH TWO (2) TIMES DAILY (WITH MEALS). metoprolol succinate 25 mg XL tablet Commonly known as:  TOPROL-XL Take 1 Tab by mouth daily. Prescriptions Sent to Pharmacy Refills  
 azithromycin (ZITHROMAX) 250 mg tablet 0 Sig: Take 2 tablets today, then take 1 tablet daily Class: Normal  
 Pharmacy: [facility]/pharmacy #[id number]- [facility], [address] [place] [address] Ph #: [phone] Follow-up Instructions Return if symptoms worsen or fail to improve. To-Do List   
 02/18/2017 Imaging:  XR CHEST PA LAT Patient Instructions Bronchitis: Care Instructions Your Care Instructions Bronchitis is inflammation of the bronchial tubes, which carry air to the lungs. The tubes swell and produce mucus, or phlegm. The mucus and inflamed bronchial tubes make you cough. You may have trouble breathing. Most cases of bronchitis are caused by viruses like those that cause colds. Antibiotics usually do not help and they may be harmful. Bronchitis usually develops rapidly and lasts about 2 to 3 weeks in otherwise healthy people. Follow-up care is a key part of your treatment and safety. Be sure to make and go to all appointments, and call your doctor if you are having problems. It's also a good idea to know your test results and keep a list of the medicines you take. How can you care for yourself at home? · Take all medicines exactly as prescribed. Call your doctor if you think you are having a problem with your medicine. · Get some extra rest. 
· Take an over-the-counter pain medicine, such as acetaminophen (Tylenol), ibuprofen (Advil, Motrin), or naproxen (Aleve) to reduce fever and relieve body aches. Read and follow all instructions on the label. · Do not take two or more pain medicines at the same time unless the doctor told you to. Many pain medicines have acetaminophen, which is Tylenol. Too much acetaminophen (Tylenol) can be harmful. · Take an over-the-counter cough medicine that contains dextromethorphan to help quiet a dry, hacking cough so that you can sleep. Avoid cough medicines that have more than one active ingredient. Read and follow all instructions on the label. · Breathe moist air from a humidifier, hot shower, or sink filled with hot water. The heat and moisture will thin mucus so you can cough it out. · Do not smoke. Smoking can make bronchitis worse. If you need help quitting, talk to your doctor about stop-smoking programs and medicines. These can increase your chances of quitting for good. When should you call for help? Call 911 anytime you think you may need emergency care. For example, call if: 
· You have severe trouble breathing. Call your doctor now or seek immediate medical care if: 
· You have new or worse trouble breathing. · You cough up dark brown or bloody mucus (sputum). · You have a new or higher fever. · You have a new rash. Watch closely for changes in your health, and be sure to contact your doctor if: 
· You cough more deeply or more often, especially if you notice more mucus or a change in the color of your mucus. · You are not getting better as expected. Where can you learn more? Go to http://charles-kristin.info/. Enter H333 in the search box to learn more about \"Bronchitis: Care Instructions. \" Current as of: May 23, 2016 Content Version: 11.1 © 1107-5262 [employer]. Care instructions adapted under license by [employer] (which disclaims liability or warranty for this information). If you have questions about a medical condition or this instruction, always ask your healthcare professional. [address] any warranty or liability for your use of this information. Please provide this summary of care documentation to your next provider. Your primary care clinician is listed as Zoe Lugo. If you have any questions after today's visit, please call [phone].

## 2017-02-18 NOTE — PATIENT INSTRUCTIONS
Bronchitis: Care Instructions  Your Care Instructions    Bronchitis is inflammation of the bronchial tubes, which carry air to the lungs. The tubes swell and produce mucus, or phlegm. The mucus and inflamed bronchial tubes make you cough. You may have trouble breathing. Most cases of bronchitis are caused by viruses like those that cause colds. Antibiotics usually do not help and they may be harmful. Bronchitis usually develops rapidly and lasts about 2 to 3 weeks in otherwise healthy people. Follow-up care is a key part of your treatment and safety. Be sure to make and go to all appointments, and call your doctor if you are having problems. It's also a good idea to know your test results and keep a list of the medicines you take. How can you care for yourself at home? · Take all medicines exactly as prescribed. Call your doctor if you think you are having a problem with your medicine. · Get some extra rest.  · Take an over-the-counter pain medicine, such as acetaminophen (Tylenol), ibuprofen (Advil, Motrin), or naproxen (Aleve) to reduce fever and relieve body aches. Read and follow all instructions on the label. · Do not take two or more pain medicines at the same time unless the doctor told you to. Many pain medicines have acetaminophen, which is Tylenol. Too much acetaminophen (Tylenol) can be harmful. · Take an over-the-counter cough medicine that contains dextromethorphan to help quiet a dry, hacking cough so that you can sleep. Avoid cough medicines that have more than one active ingredient. Read and follow all instructions on the label. · Breathe moist air from a humidifier, hot shower, or sink filled with hot water. The heat and moisture will thin mucus so you can cough it out. · Do not smoke. Smoking can make bronchitis worse. If you need help quitting, talk to your doctor about stop-smoking programs and medicines. These can increase your chances of quitting for good.   When should you call for help? Call 911 anytime you think you may need emergency care. For example, call if:  · You have severe trouble breathing. Call your doctor now or seek immediate medical care if:  · You have new or worse trouble breathing. · You cough up dark brown or bloody mucus (sputum). · You have a new or higher fever. · You have a new rash. Watch closely for changes in your health, and be sure to contact your doctor if:  · You cough more deeply or more often, especially if you notice more mucus or a change in the color of your mucus. · You are not getting better as expected. Where can you learn more? Go to http://charles-kristin.info/. Enter H333 in the search box to learn more about \"Bronchitis: Care Instructions. \"  Current as of: May 23, 2016  Content Version: 11.1  © 5974-1583 [employer]. Care instructions adapted under license by [employer] (which disclaims liability or warranty for this information). If you have questions about a medical condition or this instruction, always ask your healthcare professional. [address] any warranty or liability for your use of this information.

## 2017-02-27 ENCOUNTER — TELEPHONE (OUTPATIENT)
Dept: FAMILY MEDICINE CLINIC | Age: 67
End: 2017-02-27

## 2017-04-17 RX ORDER — METFORMIN HYDROCHLORIDE 500 MG/1
TABLET ORAL
Qty: 60 TAB | Refills: 1 | Status: SHIPPED | OUTPATIENT
Start: 2017-04-17 | End: 2017-06-17 | Stop reason: SDUPTHER

## 2017-04-17 NOTE — TELEPHONE ENCOUNTER
Refilled metformin x 60 days  PSR to call pt and schedule DM follow up within the next month, with labs

## 2017-04-27 DIAGNOSIS — T78.3XXD ANGIOEDEMA OF LIPS, SUBSEQUENT ENCOUNTER: ICD-10-CM

## 2017-04-27 RX ORDER — METOPROLOL SUCCINATE 25 MG/1
TABLET, EXTENDED RELEASE ORAL
Qty: 30 TAB | Refills: 2 | Status: SHIPPED | OUTPATIENT
Start: 2017-04-27 | End: 2017-06-27

## 2017-04-29 ENCOUNTER — OFFICE VISIT (OUTPATIENT)
Dept: FAMILY MEDICINE CLINIC | Age: 67
End: 2017-04-29

## 2017-04-29 VITALS
HEIGHT: 63 IN | RESPIRATION RATE: 18 BRPM | SYSTOLIC BLOOD PRESSURE: 147 MMHG | OXYGEN SATURATION: 100 % | DIASTOLIC BLOOD PRESSURE: 89 MMHG | HEART RATE: 73 BPM | TEMPERATURE: 98.5 F | WEIGHT: 148 LBS | BODY MASS INDEX: 26.22 KG/M2

## 2017-04-29 DIAGNOSIS — S62.663A CLOSED NONDISPLACED FRACTURE OF DISTAL PHALANX OF LEFT MIDDLE FINGER, INITIAL ENCOUNTER: Primary | ICD-10-CM

## 2017-04-29 DIAGNOSIS — S69.92XA FINGER INJURY, LEFT, INITIAL ENCOUNTER: ICD-10-CM

## 2017-04-29 NOTE — PROGRESS NOTES
HISTORY OF PRESENT ILLNESS  Leatha Clark is a 77 y.o. female. HPI   This lady \"jammed\" her left middle finger on a table yesterday  Presents c/o pain and swelling of the finger    Review of Systems   Constitutional: Negative for chills and fever. Musculoskeletal:        Pain left middle finger   Neurological: Negative for tingling. Physical Exam   Constitutional: She is oriented to person, place, and time. She appears well-developed and well-nourished. No distress. Musculoskeletal: She exhibits tenderness. Visible swelling of distal 3rd finger  ttp   Neurological: She is alert and oriented to person, place, and time. She has normal reflexes. Skin: Skin is warm. She is not diaphoretic. No erythema. ASSESSMENT and PLAN    ICD-10-CM ICD-9-CM    1. Closed nondisplaced fracture of distal phalanx of left middle finger, initial encounter S62.663A 816.02    2. Finger injury, left, initial encounter S69. 92XA 959.5 XR 3RD FINGER LT MIN 2 V   xray-fracture-very minimal displacement  Will immobilize  RICE  Follow up next week   Precautions given

## 2017-04-29 NOTE — MR AVS SNAPSHOT
Visit Information Date & Time Provider Department Dept. Phone Encounter #  
 4/29/2017 10:45 AM Alexander Adamson, Rob De Guzman [phone] [id number] Your Appointments 5/3/2017  1:25 PM  
ROUTINE CARE with Kennedy Westfall MD  
Rob De Guzman [facility] Appt Note: f/u to injured finger [address] [place] [address]  
[phone]  
  
   
 [address] [facility] [address] [address] 5/17/2017  1:20 PM  
ROUTINE CARE with Truman Hinson MD  
Rob De Guzman [facility] Appt Note: BP check; no show of last appt, cm  
 [address] [place] [address]  
[phone]  
  
   
 [address] [facility] [address] Upcoming Health Maintenance Date Due Hepatitis C Screening 1950 FOOT EXAM Q1 8/15/1960 EYE EXAM RETINAL OR DILATED Q1 8/15/1960 DTaP/Tdap/Td series (1 - Tdap) 8/15/1971 FOBT Q 1 YEAR AGE 50-75 8/15/2000 ZOSTER VACCINE AGE 60> 8/15/2010 GLAUCOMA SCREENING Q2Y 8/15/2015 OSTEOPOROSIS SCREENING (DEXA) 8/15/2015 MEDICARE YEARLY EXAM 8/15/2015 HEMOGLOBIN A1C Q6M 5/7/2017 MICROALBUMIN Q1 11/7/2017 LIPID PANEL Q1 11/7/2017 Pneumococcal 65+ Low/Medium Risk (2 of 2 - PPSV23) 11/18/2017 BREAST CANCER SCRN MAMMOGRAM 1/9/2019 Allergies as of 4/29/2017  Review Complete On: 4/29/2017 By: Katherine Dubose LPN Severity Noted Reaction Type Reactions Penicillins  11/07/2016   Systemic Rash, Swelling Current Immunizations  Never Reviewed Name Date Influenza High Dose Vaccine PF 11/7/2016 Not reviewed this visit You Were Diagnosed With   
  
 Codes Comments Finger injury, left, initial encounter    -  Primary ICD-10-CM: J76.51MD ICD-9-CM: 959.5 Vitals BP Pulse Temp Resp Height(growth percentile) Weight(growth percentile) 147/89 (BP 1 Location: Left arm, BP Patient Position: Sitting) 73 98.5 °F (36.9 °C) (Oral) 18 5' 3\" (1.6 m) 148 lb (67.1 kg) SpO2 BMI OB Status Smoking Status 100% 26.22 kg/m2 Hysterectomy Never Smoker Vitals History BMI and BSA Data Body Mass Index Body Surface Area  
 26.22 kg/m 2 1.73 m 2 Preferred Pharmacy Pharmacy Name Phone [facility]/PHARMACY #[id number]- [place] [phone] Your Updated Medication List  
  
   
This list is accurate as of: 4/29/17 11:21 AM.  Always use your most recent med list.  
  
  
  
  
 metFORMIN 500 mg tablet Commonly known as:  GLUCOPHAGE  
TAKE 1 TAB BY MOUTH TWO (2) TIMES DAILY (WITH MEALS). metoprolol succinate 25 mg XL tablet Commonly known as:  TOPROL-XL  
TAKE 1 TABLET BY MOUTH EVERY DAY To-Do List   
 04/29/2017 Imaging:  XR 3RD FINGER LT MIN 2 V Please provide this summary of care documentation to your next provider. Your primary care clinician is listed as Le Thomas. If you have any questions after today's visit, please call [phone].

## 2017-04-29 NOTE — PROGRESS NOTES
Chief Complaint   Patient presents with    Injury     pt states left middle finger injury last night, jammed in a table      1. Have you been to the ER, urgent care clinic since your last visit? Hospitalized since your last visit? No    2. Have you seen or consulted any other health care providers outside of the [address] [place] [address] since your last visit? Include any pap smears or colon screening.  No

## 2017-05-03 ENCOUNTER — OFFICE VISIT (OUTPATIENT)
Dept: FAMILY MEDICINE CLINIC | Age: 67
End: 2017-05-03

## 2017-05-03 VITALS
DIASTOLIC BLOOD PRESSURE: 87 MMHG | HEIGHT: 63 IN | BODY MASS INDEX: 26.4 KG/M2 | SYSTOLIC BLOOD PRESSURE: 160 MMHG | WEIGHT: 149 LBS | RESPIRATION RATE: 16 BRPM | HEART RATE: 71 BPM | OXYGEN SATURATION: 99 % | TEMPERATURE: 98.4 F

## 2017-05-03 DIAGNOSIS — S69.92XD FINGER INJURY, LEFT, SUBSEQUENT ENCOUNTER: Primary | ICD-10-CM

## 2017-05-03 RX ORDER — OLMESARTAN MEDOXOMIL AND HYDROCHLOROTHIAZIDE 40/12.5 40; 12.5 MG/1; MG/1
1 TABLET ORAL DAILY
COMMUNITY
End: 2017-05-17 | Stop reason: SDUPTHER

## 2017-05-03 NOTE — PROGRESS NOTES
Chief Complaint   Patient presents with    Injury     finger     1. Have you been to the ER, urgent care clinic since your last visit? Hospitalized since your last visit? No    2. Have you seen or consulted any other health care providers outside of the [address] [place] [address] since your last visit? Include any pap smears or colon screening.  No

## 2017-05-04 ENCOUNTER — TELEPHONE (OUTPATIENT)
Dept: FAMILY MEDICINE CLINIC | Age: 67
End: 2017-05-04

## 2017-05-04 NOTE — TELEPHONE ENCOUNTER
Patient dropped off paperwork to be filled out to return to work regarding her injured finger that she was seen for on 5/3/17 with you. The paperwork is at the front in the black box with your team folder. Please call patient when paperwork is completed thank you!

## 2017-05-09 NOTE — TELEPHONE ENCOUNTER
Patient calling to check the status of this paperwork, Informed the patient it has been taken from the file at the  but has not been left in file for , doctor has not completed it. Patient would like a call at [phone] when this is complete.

## 2017-05-17 ENCOUNTER — OFFICE VISIT (OUTPATIENT)
Dept: FAMILY MEDICINE CLINIC | Age: 67
End: 2017-05-17

## 2017-05-17 VITALS
TEMPERATURE: 98.1 F | OXYGEN SATURATION: 99 % | WEIGHT: 146 LBS | DIASTOLIC BLOOD PRESSURE: 82 MMHG | SYSTOLIC BLOOD PRESSURE: 127 MMHG | RESPIRATION RATE: 16 BRPM | BODY MASS INDEX: 25.87 KG/M2 | HEART RATE: 85 BPM | HEIGHT: 63 IN

## 2017-05-17 DIAGNOSIS — I10 ESSENTIAL HYPERTENSION: ICD-10-CM

## 2017-05-17 DIAGNOSIS — Z78.0 POSTMENOPAUSAL: ICD-10-CM

## 2017-05-17 DIAGNOSIS — Z23 ENCOUNTER FOR IMMUNIZATION: ICD-10-CM

## 2017-05-17 DIAGNOSIS — Z12.11 COLON CANCER SCREENING: ICD-10-CM

## 2017-05-17 DIAGNOSIS — Z23 NEED FOR TDAP VACCINATION: ICD-10-CM

## 2017-05-17 DIAGNOSIS — Z13.31 SCREENING FOR DEPRESSION: ICD-10-CM

## 2017-05-17 DIAGNOSIS — E11.9 CONTROLLED TYPE 2 DIABETES MELLITUS WITHOUT COMPLICATION, WITHOUT LONG-TERM CURRENT USE OF INSULIN (HCC): ICD-10-CM

## 2017-05-17 DIAGNOSIS — Z00.00 INITIAL MEDICARE ANNUAL WELLNESS VISIT: Primary | ICD-10-CM

## 2017-05-17 DIAGNOSIS — Z12.11 SCREEN FOR COLON CANCER: ICD-10-CM

## 2017-05-17 DIAGNOSIS — Z13.39 SCREENING FOR ALCOHOLISM: ICD-10-CM

## 2017-05-17 DIAGNOSIS — Z11.59 ENCOUNTER FOR HEPATITIS C SCREENING TEST FOR LOW RISK PATIENT: ICD-10-CM

## 2017-05-17 RX ORDER — OLMESARTAN MEDOXOMIL AND HYDROCHLOROTHIAZIDE 40/12.5 40; 12.5 MG/1; MG/1
1 TABLET ORAL DAILY
Qty: 90 TAB | Refills: 3 | Status: SHIPPED | OUTPATIENT
Start: 2017-05-17 | End: 2018-03-27 | Stop reason: SDUPTHER

## 2017-05-17 NOTE — PROGRESS NOTES
Chief Complaint   Patient presents with    Hypertension     1. Have you been to the ER, urgent care clinic since your last visit? Hospitalized since your last visit? No    2. Have you seen or consulted any other health care providers outside of the [facility] since your last visit? Include any pap smears or colon screening.  No

## 2017-05-17 NOTE — MR AVS SNAPSHOT
Visit Information Date & Time Provider Department Dept. Phone Encounter #  
 5/17/2017  1:20 PM David Cooper, [address] [facility] [phone] [id number] Follow-up Instructions Return in about 6 weeks (around 6/28/2017). Upcoming Health Maintenance Date Due Hepatitis C Screening 1950 FOOT EXAM Q1 8/15/1960 EYE EXAM RETINAL OR DILATED Q1 8/15/1960 DTaP/Tdap/Td series (1 - Tdap) 8/15/1971 FOBT Q 1 YEAR AGE 50-75 8/15/2000 ZOSTER VACCINE AGE 60> 8/15/2010 GLAUCOMA SCREENING Q2Y 8/15/2015 OSTEOPOROSIS SCREENING (DEXA) 8/15/2015 MEDICARE YEARLY EXAM 8/15/2015 HEMOGLOBIN A1C Q6M 5/7/2017 INFLUENZA AGE 9 TO ADULT 8/1/2017 MICROALBUMIN Q1 11/7/2017 LIPID PANEL Q1 11/7/2017 Pneumococcal 65+ Low/Medium Risk (2 of 2 - PPSV23) 11/18/2017 BREAST CANCER SCRN MAMMOGRAM 1/9/2019 Allergies as of 5/17/2017  Review Complete On: 5/17/2017 By: Carlie Marie LPN Severity Noted Reaction Type Reactions Penicillins  11/07/2016   Systemic Rash, Swelling Current Immunizations  Never Reviewed Name Date Influenza High Dose Vaccine PF 11/7/2016 Not reviewed this visit You Were Diagnosed With   
  
 Codes Comments Initial Medicare annual wellness visit    -  Primary ICD-10-CM: Z00.00 ICD-9-CM: V70.0 Controlled type 2 diabetes mellitus without complication, without long-term current use of insulin ([facility]     ICD-10-CM: E11.9 ICD-9-CM: 250.00 Essential hypertension     ICD-10-CM: I10 
ICD-9-CM: 401.9 Encounter for hepatitis C screening test for low risk patient     ICD-10-CM: Z11.59 
ICD-9-CM: V73.89 Colon cancer screening     ICD-10-CM: Z12.11 ICD-9-CM: V76.51 Routine general medical examination at a health care facility     ICD-10-CM: Z00.00 ICD-9-CM: V70.0 Screening for alcoholism     ICD-10-CM: Z13.89 ICD-9-CM: V79.1  Encounter for immunization     ICD-10-CM: V01 
 ICD-9-CM: V03.89 Screening for depression     ICD-10-CM: Z13.89 ICD-9-CM: V79.0 Screen for colon cancer     ICD-10-CM: Z12.11 ICD-9-CM: V76.51 Need for hepatitis C screening test     ICD-10-CM: Z11.59 
ICD-9-CM: V73.89 Postmenopausal     ICD-10-CM: Z78.0 ICD-9-CM: V49.81 Need for Tdap vaccination     ICD-10-CM: E93 ICD-9-CM: V06.1 Vitals BP Pulse Temp Resp Height(growth percentile) Weight(growth percentile) 127/82 (BP 1 Location: Left arm, BP Patient Position: Sitting) 85 98.1 °F (36.7 °C) (Oral) 16 5' 3\" (1.6 m) 146 lb (66.2 kg) SpO2 BMI OB Status Smoking Status 99% 25.86 kg/m2 Hysterectomy Never Smoker Vitals History BMI and BSA Data Body Mass Index Body Surface Area  
 25.86 kg/m 2 1.72 m 2 Preferred Pharmacy Pharmacy Name Phone [id number] Mcgill, [address] [place], [place] [phone] Your Updated Medication List  
  
   
This list is accurate as of: 17  2:17 PM.  Always use your most recent med list.  
  
  
  
  
 diph,pertuss(acel),tetanus vac(PF) 2 Lf-(2.5-5-3-5 mcg)-5Lf/0.5 mL Syrg vaccine Commonly known as:  ADACEL(TDAP ADOLESN/ADULT)(PF)  
0.5 mL by IntraMUSCular route once for 1 dose. metFORMIN 500 mg tablet Commonly known as:  GLUCOPHAGE  
TAKE 1 TAB BY MOUTH TWO (2) TIMES DAILY (WITH MEALS). metoprolol succinate 25 mg XL tablet Commonly known as:  TOPROL-XL  
TAKE 1 TABLET BY MOUTH EVERY DAY  
  
 olmesartan-hydroCHLOROthiazide 40-12.5 mg per tablet Commonly known as:  BENICAR HCT Take 1 Tab by mouth daily. varicella zoster vacine live 19,400 unit/0.65 mL Susr injection Commonly known as:  ZOSTAVAX  
1 Vial by SubCUTAneous route once for 1 dose. Prescriptions Printed Refills  
 varicella zoster vacine live (ZOSTAVAX) 19,400 unit/0.65 mL susr injection 0 Si Vial by SubCUTAneous route once for 1 dose. Class: Print Route: SubCUTAneous diph,pertuss,acel,,tetanus vac,PF, (ADACEL,TDAP ADOLESN/ADULT,,PF,) 2 Lf-(2.5-5-3-5 mcg)-5Lf/0.5 mL syrg vaccine 0 Si.5 mL by IntraMUSCular route once for 1 dose. Class: Print Route: IntraMUSCular Prescriptions Sent to Pharmacy Refills  
 olmesartan-hydroCHLOROthiazide (BENICAR HCT) 40-12.5 mg per tablet 3 Sig: Take 1 Tab by mouth daily. Class: Normal  
 Pharmacy: [facility]/pharmacy [address] [place] [address] Ph #: [phone] Route: Oral  
  
We Performed the Following [facility] 68 [[id number] [facility]] HEMOGLOBIN A1C WITH EAG [59256 CPT(R)] HEPATITIS C AB [58910 CPT(R)]  DIABETES FOOT EXAM [7 Custom] METABOLIC PANEL, BASIC [79701 CPT(R)] OCCULT BLOOD, IMMUNOASSAY (FIT) [id number] CPT(R)] Follow-up Instructions Return in about 6 weeks (around 2017). To-Do List   
 2017 Imaging:  DEXA BONE DENSITY STUDY AXIAL Patient Instructions Medicare Wellness Visit, Female The best way to live healthy is to have a healthy lifestyle by eating a well-balanced diet, exercising regularly, limiting alcohol and stopping smoking. Regular physical exams and screening tests are another way to keep healthy. Preventive exams provided by your health care provider can find health problems before they become diseases or illnesses. Preventive services including immunizations, screening tests, monitoring and exams can help you take care of your own health. All people over age 72 should have a pneumovax  and and a prevnar shot to prevent pneumonia. These are once in a lifetime unless you and your provider decide differently. All people over 65 should have a yearly flu shot and a tetanus vaccine every 10 years. A bone mass density to screen for osteoporosis or thinning of the bones should be done every 2 years after 65. Screening for diabetes mellitus with a blood sugar test should be done every year. Glaucoma is a disease of the eye due to increased ocular pressure that can lead to blindness and it should be done every year by an eye professional. 
 
Cardiovascular screening tests that check for elevated lipids (fatty part of blood) which can lead to heart disease and strokes should be done every 5 years. Colorectal screening that evaluates for blood or polyps in your colon should be done yearly as a stool test or every five years as a flexible sigmoidoscope or every 10 years as a colonoscopy up to age 76. Breast cancer screening with a mammogram is recommended biennially  for women age 54-69. Screening for cervical cancer with a pap smear and pelvic exam is recommended for women after age 72 years every 2 years up to age 79 or when the provider and patient decide to stop. If there is a history of cervical abnormalities or other increased risk for cancer then the test is recommended yearly. Hepatitis C screening is also recommended for anyone born between 80 through [address]. A shingles vaccine is also recommended once in a lifetime after age 61. Your Medicare Wellness Exam is recommended annually. Here is a list of your current Health Maintenance items with a due date: 
Health Maintenance Due Topic Date Due  
 Hepatitis C Test  [address] [place] [address] Diabetic Foot Care  08/15/1960  Eye Exam  08/15/1960  
 DTaP/Tdap/Td  (1 - Tdap) 08/15/1971  Stool testing for trace blood  08/15/2000  Shingles Vaccine  08/15/2010  Glaucoma Screening   08/15/2015  Bone Density Screening  08/15/2015 [address] [place] [address] Annual Well Visit  08/15/2015  Hemoglobin A1C    05/07/2017 Diabetes Foot Health: Care Instructions Your Care Instructions When you have diabetes, your feet need extra care and attention.  Diabetes can damage the nerve endings and blood vessels in your feet, making you less likely to notice when your feet are injured. Diabetes also limits your body's ability to fight infection and get blood to areas that need it. If you get a minor foot injury, it could become an ulcer or a serious infection. With good foot care, you can prevent most of these problems. Caring for your feet can be quick and easy. Most of the care can be done when you are bathing or getting ready for bed. Follow-up care is a key part of your treatment and safety. Be sure to make and go to all appointments, and call your doctor if you are having problems. Its also a good idea to know your test results and keep a list of the medicines you take. How can you care for yourself at home? · Keep your blood sugar close to normal by watching what and how much you eat, monitoring blood sugar, taking medicines if prescribed, and getting regular exercise. · Do not smoke. Smoking affects blood flow and can make foot problems worse. If you need help quitting, talk to your doctor about stop-smoking programs and medicines. These can increase your chances of quitting for good. · Eat a diet that is low in fats. High fat intake can cause fat to build up in your blood vessels and decrease blood flow. · Inspect your feet daily for blisters, cuts, cracks, or sores. If you cannot see well, use a mirror or have someone help you. · Take care of your feet: 
[facility] your feet every day. Use warm (not hot) water. Check the water temperature with your wrists or other part of your body, not your feet. ¨ Dry your feet well. Pat them dry. Do not rub the skin on your feet too hard. Dry well between your toes. If the skin on your feet stays moist, bacteria or a fungus can grow, which can lead to infection. ¨ Keep your skin soft. Use moisturizing skin cream to keep the skin on your feet soft and prevent calluses and cracks. But do not put the cream between your toes, and stop using any cream that causes a rash. ¨ Clean underneath your toenails carefully. Do not use a sharp object to clean underneath your toenails. Use the blunt end of a nail file or other rounded tool. ¨ Trim and file your toenails straight across to prevent ingrown toenails. Use a nail clipper, not scissors. Use an emery board to smooth the edges. · Change socks daily. Socks without seams are best, because seams often rub the feet. You can find socks for people with diabetes from specialty catalogs. · Look inside your shoes every day for things like gravel or torn linings, which could cause blisters or sores. · Buy shoes that fit well: 
¨ Look for shoes that have plenty of space around the toes. This helps prevent bunions and blisters. ¨ Try on shoes while wearing the kind of socks you will usually wear with the shoes. ¨ Avoid plastic shoes. They may rub your feet and cause blisters. Good shoes should be made of materials that are flexible and breathable, such as leather or cloth. ¨ Break in new shoes slowly by wearing them for no more than an hour a day for several days. Take extra time to check your feet for red areas, blisters, or other problems after you wear new shoes. · Do not go barefoot. Do not wear sandals, and do not wear shoes with very thin soles. Thin soles are easy to puncture. They also do not protect your feet from hot pavement or cold weather. · Have your doctor check your feet during each visit. If you have a foot problem, see your doctor. Do not try to treat an early foot problem at home. Home remedies or treatments that you can buy without a prescription (such as corn removers) can be harmful. · Always get early treatment for foot problems. A minor irritation can lead to a major problem if not properly cared for early. When should you call for help?  
Call your doctor now or seek immediate medical care if: 
· You have a foot sore, an ulcer or break in the skin that is not healing after 4 days, bleeding corns or calluses, or an ingrown toenail. · You have blue or black areas, which can mean bruising or blood flow problems. · You have peeling skin or tiny blisters between your toes or cracking or oozing of the skin. · You have a fever for more than 24 hours and a foot sore. · You have new numbness or tingling in your feet that does not go away after you move your feet or change positions. · You have unexplained or unusual swelling of the foot or ankle. Watch closely for changes in your health, and be sure to contact your doctor if: 
· You cannot do proper foot care. Where can you learn more? Go to http://charles-kristin.info/. Enter A739 in the search box to learn more about \"Diabetes Foot Health: Care Instructions. \" Current as of: May 23, 2016 Content Version: 11.2 © 6411-6778 [employer]. Care instructions adapted under license by [employer] (which disclaims liability or warranty for this information). If you have questions about a medical condition or this instruction, always ask your healthcare professional. [place] [address] any warranty or liability for your use of this information. Learning About Diabetes Food Guidelines Your Care Instructions Meal planning is important to manage diabetes. It helps keep your blood sugar at a target level (which you set with your doctor). You don't have to eat special foods. You can eat what your family eats, including sweets once in a while. But you do have to pay attention to how often you eat and how much you eat of certain foods. You may want to work with a dietitian or a certified diabetes educator (CDE) to help you plan meals and snacks. A dietitian or CDE can also help you lose weight if that is one of your goals. What should you know about eating carbs?  
Managing the amount of carbohydrate (carbs) you eat is an important part of healthy meals when you have diabetes. Carbohydrate is found in many foods. · Learn which foods have carbs. And learn the amounts of carbs in different foods. ¨ Bread, cereal, pasta, and rice have about 15 grams of carbs in a serving. A serving is 1 slice of bread (1 ounce), ½ cup of cooked cereal, or 1/3 cup of cooked pasta or rice. ¨ Fruits have 15 grams of carbs in a serving. A serving is 1 small fresh fruit, such as an apple or orange; ½ of a banana; ½ cup of cooked or canned fruit; ½ cup of fruit juice; 1 cup of melon or raspberries; or 2 tablespoons of dried fruit. ¨ Milk and no-sugar-added yogurt have 15 grams of carbs in a serving. A serving is 1 cup of milk or 2/3 cup of no-sugar-added yogurt. ¨ Starchy vegetables have 15 grams of carbs in a serving. A serving is ½ cup of mashed potatoes or sweet potato; 1 cup winter squash; ½ of a small baked potato; ½ cup of cooked beans; or ½ cup cooked corn or green peas. · Learn how much carbs to eat each day and at each meal. A dietitian or CDE can teach you how to keep track of the amount of carbs you eat. This is called carbohydrate counting. · If you are not sure how to count carbohydrate grams, use the Plate Method to plan meals. It is a good, quick way to make sure that you have a balanced meal. It also helps you spread carbs throughout the day. ¨ Divide your plate by types of foods. Put non-starchy vegetables on half the plate, meat or other protein food on one-quarter of the plate, and a grain or starchy vegetable in the final quarter of the plate. To this you can add a small piece of fruit and 1 cup of milk or yogurt, depending on how many carbs you are supposed to eat at a meal. 
· Try to eat about the same amount of carbs at each meal. Do not \"save up\" your daily allowance of carbs to eat at one meal. 
· Proteins have very little or no carbs per serving.  Examples of proteins are beef, chicken, turkey, fish, eggs, tofu, cheese, cottage cheese, and peanut butter. A serving size of meat is 3 ounces, which is about the size of a deck of cards. Examples of meat substitute serving sizes (equal to 1 ounce of meat) are 1/4 cup of cottage cheese, 1 egg, 1 tablespoon of peanut butter, and ½ cup of tofu. How can you eat out and still eat healthy? · Learn to estimate the serving sizes of foods that have carbohydrate. If you measure food at home, it will be easier to estimate the amount in a serving of restaurant food. · If the meal you order has too much carbohydrate (such as potatoes, corn, or baked beans), ask to have a low-carbohydrate food instead. Ask for a salad or green vegetables. · If you use insulin, check your blood sugar before and after eating out to help you plan how much to eat in the future. · If you eat more carbohydrate at a meal than you had planned, take a walk or do other exercise. This will help lower your blood sugar. What else should you know? · Limit saturated fat, such as the fat from meat and dairy products. This is a healthy choice because people who have diabetes are at higher risk of heart disease. So choose lean cuts of meat and nonfat or low-fat dairy products. Use olive or canola oil instead of butter or shortening when cooking. · Don't skip meals. Your blood sugar may drop too low if you skip meals and take insulin or certain medicines for diabetes. · Check with your doctor before you drink alcohol. Alcohol can cause your blood sugar to drop too low. Alcohol can also cause a bad reaction if you take certain diabetes medicines. Follow-up care is a key part of your treatment and safety. Be sure to make and go to all appointments, and call your doctor if you are having problems. It's also a good idea to know your test results and keep a list of the medicines you take. Where can you learn more? Go to http://charles-kristin.info/.  
Enter J473 in the search box to learn more about \"Learning About Diabetes Food Guidelines. \" Current as of: May 23, 2016 Content Version: 11.2 © 0189-0566 [employer]. Care instructions adapted under license by [employer] (which disclaims liability or warranty for this information). If you have questions about a medical condition or this instruction, always ask your healthcare professional. [address] any warranty or liability for your use of this information. Learning About Colonoscopy What is a colonoscopy? A colonoscopy is a test (also called a procedure) that lets a doctor look inside your large intestine. The doctor uses a thin, lighted tube called a colonoscope. The doctor uses it to look for small growths called polyps, colon or rectal cancer (colorectal cancer), or other problems like bleeding. During the procedure, the doctor can take samples of tissue. The samples can then be checked for cancer or other conditions. The doctor can also take out polyps. How is colonoscopy done? This procedure is done in a doctor's office or a clinic or hospital. You will get medicine to help you relax and not feel pain. Some people find that they do not remember having the test because of the medicine. The doctor gently moves the colonoscope, or scope, through the colon. The scope is also a small video camera. It lets the doctor see the colon and take pictures. A colonoscopy usually takes 30 to 45 minutes. It may take longer if the doctor has to remove polyps. How do you prepare for the procedure? You need to clean out your colon before the procedure so the doctor can see all of your colon. You may start the cleaning process a day or two before the test. This depends on which \"colon prep\" your doctor recommends. To clean your colon, you stop eating solid foods and drink only clear liquids. You can have water, tea, coffee, clear juices, clear broths, flavored ice pops, and gelatin (such as Jell-O).  Do not drink anything red or purple, such as grape juice or fruit punch. And do not eat red or purple foods, such as grape ice pops or cherry gelatin. The day or night before the procedure, you drink a large amount of a special liquid. This causes loose, frequent stools. You will go to the bathroom a lot. It is very important to drink all of the colon prep liquid. If you have problems drinking the liquid, call your doctor. For many people, the prep is worse than the test. It may be uncomfortable, and you may feel hungry on the clear liquid diet. Some people do not go to work or do their usual activities on the day of the prep. Arrange to have someone take you home after the test. 
What can you expect after a colonoscopy? The nurses will watch you for 1 to 2 hours until the medicines wear off. Then you can go home. You will need a ride. Your doctor will tell you when you can eat and do your usual activities. Your doctor will talk to you about when you will need your next colonoscopy. The results of your test and your risk for colorectal cancer will help your doctor decide how often you need to be checked. Follow-up care is a key part of your treatment and safety. Be sure to make and go to all appointments, and call your doctor if you are having problems. It's also a good idea to know your test results and keep a list of the medicines you take. Where can you learn more? Go to http://charles-kristin.info/. Enter G856 in the search box to learn more about \"Learning About Colonoscopy. \" Current as of: July 26, 2016 Content Version: 11.2 © 4881-4772 [employer]. Care instructions adapted under license by [employer] (which disclaims liability or warranty for this information). If you have questions about a medical condition or this instruction, always ask your healthcare professional. [address] any warranty or liability for your use of this information. Heart-Healthy Diet: Care Instructions Your Care Instructions A heart-healthy diet has lots of vegetables, fruits, nuts, beans, and whole grains, and is low in salt. It limits foods that are high in saturated fat, such as meats, cheeses, and fried foods. It may be hard to change your diet, but even small changes can lower your risk of heart attack and heart disease. Follow-up care is a key part of your treatment and safety. Be sure to make and go to all appointments, and call your doctor if you are having problems. It's also a good idea to know your test results and keep a list of the medicines you take. How can you care for yourself at home? Watch your portions · Learn what a serving is. A \"serving\" and a \"portion\" are not always the same thing. Make sure that you are not eating larger portions than are recommended. For example, a serving of pasta is ½ cup. A serving size of meat is 2 to 3 ounces. A 3-ounce serving is about the size of a deck of cards. Measure serving sizes until you are good at [place]" them. Keep in mind that restaurants often serve portions that are 2 or 3 times the size of one serving. · To keep your energy level up and keep you from feeling hungry, eat often but in smaller portions. · Eat only the number of calories you need to stay at a healthy weight. If you need to lose weight, eat fewer calories than your body burns (through exercise and other physical activity). Eat more fruits and vegetables · Eat a variety of fruit and vegetables every day. Dark green, deep orange, red, or yellow fruits and vegetables are especially good for you. Examples include spinach, carrots, peaches, and berries. · Keep carrots, celery, and other veggies handy for snacks. Buy fruit that is in season and store it where you can see it so that you will be tempted to eat it. · Cook dishes that have a lot of veggies in them, such as stir-fries and soups. Limit saturated and trans fat · Read food labels, and try to avoid saturated and trans fats. They increase your risk of heart disease. Trans fat is found in many processed foods such as cookies and crackers. · Use olive or canola oil when you cook. Try cholesterol-lowering spreads, such as Benecol or Take Control. · Bake, broil, grill, or steam foods instead of frying them. · Choose lean meats instead of high-fat meats such as hot dogs and sausages. Cut off all visible fat when you prepare meat. · Eat fish, skinless poultry, and meat alternatives such as soy products instead of high-fat meats. Soy products, such as tofu, may be especially good for your heart. · Choose low-fat or fat-free milk and dairy products. Eat fish · Eat at least two servings of fish a week. Certain fish, such as salmon and tuna, contain omega-3 fatty acids, which may help reduce your risk of heart attack. Eat foods high in fiber · Eat a variety of grain products every day. Include whole-grain foods that have lots of fiber and nutrients. Examples of whole-grain foods include oats, whole wheat bread, and brown rice. · Buy whole-grain breads and cereals, instead of white bread or pastries. Limit salt and sodium · Limit how much salt and sodium you eat to help lower your blood pressure. · Taste food before you salt it. Add only a little salt when you think you need it. With time, your taste buds will adjust to less salt. · Eat fewer snack items, fast foods, and other high-salt, processed foods. Check food labels for the amount of sodium in packaged foods. · Choose low-sodium versions of canned goods (such as soups, vegetables, and beans). Limit sugar · Limit drinks and foods with added sugar. These include candy, desserts, and soda pop. Limit alcohol · Limit alcohol to no more than 2 drinks a day for men and 1 drink a day for women. Too much alcohol can cause health problems. When should you call for help? Watch closely for changes in your health, and be sure to contact your doctor if: 
· You would like help planning heart-healthy meals. Where can you learn more? Go to http://charles-kristin.info/. Enter V137 in the search box to learn more about \"Heart-Healthy Diet: Care Instructions. \" Current as of: January 27, 2016 Content Version: 11.2 © 9118-3275 [employer]. Care instructions adapted under license by [employer] (which disclaims liability or warranty for this information). If you have questions about a medical condition or this instruction, always ask your healthcare professional. [place] [address] any warranty or liability for your use of this information. Advance Directives: Care Instructions Your Care Instructions An advance directive is a legal way to state your wishes at the end of your life. It tells your family and your doctor what to do if you can no longer say what you want. There are two main types of advance directives. You can change them any time that your wishes change. · A living will tells your family and your doctor your wishes about life support and other treatment. · A durable power of  for health care lets you name a person to make treatment decisions for you when you can't speak for yourself. This person is called a health care agent. If you do not have an advance directive, decisions about your medical care may be made by a doctor or a  who doesn't know you. It may help to think of an advance directive as a gift to the people who care for you. If you have one, they won't have to make tough decisions by themselves. Follow-up care is a key part of your treatment and safety. Be sure to make and go to all appointments, and call your doctor if you are having problems. It's also a good idea to know your test results and keep a list of the medicines you take. How can you care for yourself at home? · Discuss your wishes with your loved ones and your doctor. This way, there are no surprises. · Many states have a unique form. Or you might use a universal form that has been approved by many states. This kind of form can sometimes be completed and stored online. Your electronic copy will then be available wherever you have a connection to the Internet. In most cases, doctors will respect your wishes even if you have a form from a different state. · You don't need a  to do an advance directive. But you may want to get legal advice. · Think about these questions when you prepare an advance directive: ¨ Who do you want to make decisions about your medical care if you are not able to? Many people choose a family member or close friend. ¨ Do you know enough about life support methods that might be used? If not, talk to your doctor so you understand. ¨ What are you most afraid of that might happen? You might be afraid of having pain, losing your independence, or being kept alive by machines. ¨ Where would you prefer to die? Choices include your home, a hospital, or a nursing home. ¨ Would you like to have information about hospice care to support you and your family? ¨ Do you want to donate organs when you die? ¨ Do you want certain Rastafari practices performed before you die? If so, put your wishes in the advance directive. · Read your advance directive every year, and make changes as needed. When should you call for help? Be sure to contact your doctor if you have any questions. Where can you learn more? Go to http://charles-kristin.info/. Enter R264 in the search box to learn more about \"Advance Directives: Care Instructions. \" Current as of: November 17, 2016 Content Version: 11.2 © 1098-2714 [employer].  Care instructions adapted under license by "[employer] (which disclaims liability or warranty for this information). If you have questions about a medical condition or this instruction, always ask your healthcare professional. [address] any warranty or liability for your use of this information. Please provide this summary of care documentation to your next provider. Your primary care clinician is listed as Karri Taveras. If you have any questions after today's visit, please call [phone].

## 2017-05-17 NOTE — PATIENT INSTRUCTIONS
Medicare Wellness Visit, Female    The best way to live healthy is to have a healthy lifestyle by eating a well-balanced diet, exercising regularly, limiting alcohol and stopping smoking. Regular physical exams and screening tests are another way to keep healthy. Preventive exams provided by your health care provider can find health problems before they become diseases or illnesses. Preventive services including immunizations, screening tests, monitoring and exams can help you take care of your own health. All people over age 72 should have a pneumovax  and and a prevnar shot to prevent pneumonia. These are once in a lifetime unless you and your provider decide differently. All people over 65 should have a yearly flu shot and a tetanus vaccine every 10 years. A bone mass density to screen for osteoporosis or thinning of the bones should be done every 2 years after 65. Screening for diabetes mellitus with a blood sugar test should be done every year. Glaucoma is a disease of the eye due to increased ocular pressure that can lead to blindness and it should be done every year by an eye professional.    Cardiovascular screening tests that check for elevated lipids (fatty part of blood) which can lead to heart disease and strokes should be done every 5 years. Colorectal screening that evaluates for blood or polyps in your colon should be done yearly as a stool test or every five years as a flexible sigmoidoscope or every 10 years as a colonoscopy up to age 76. Breast cancer screening with a mammogram is recommended biennially  for women age 54-69. Screening for cervical cancer with a pap smear and pelvic exam is recommended for women after age 72 years every 2 years up to age 79 or when the provider and patient decide to stop. If there is a history of cervical abnormalities or other increased risk for cancer then the test is recommended yearly.     Hepatitis C screening is also recommended for anyone born between [birth year] through [address]. A shingles vaccine is also recommended once in a lifetime after age 61. Your Medicare Wellness Exam is recommended annually. Here is a list of your current Health Maintenance items with a due date:  Health Maintenance Due   Topic Date Due    Hepatitis C Test  1950    Diabetic Foot Care  08/15/1960    Eye Exam  08/15/1960    DTaP/Tdap/Td  (1 - Tdap) 08/15/1971    Stool testing for trace blood  08/15/2000    Shingles Vaccine  08/15/2010    Glaucoma Screening   08/15/2015    Bone Density Screening  08/15/2015    Annual Well Visit  08/15/2015    Hemoglobin A1C    05/07/2017            Diabetes Foot Health: Care Instructions  Your Care Instructions    When you have diabetes, your feet need extra care and attention. Diabetes can damage the nerve endings and blood vessels in your feet, making you less likely to notice when your feet are injured. Diabetes also limits your body's ability to fight infection and get blood to areas that need it. If you get a minor foot injury, it could become an ulcer or a serious infection. With good foot care, you can prevent most of these problems. Caring for your feet can be quick and easy. Most of the care can be done when you are bathing or getting ready for bed. Follow-up care is a key part of your treatment and safety. Be sure to make and go to all appointments, and call your doctor if you are having problems. Its also a good idea to know your test results and keep a list of the medicines you take. How can you care for yourself at home? · Keep your blood sugar close to normal by watching what and how much you eat, monitoring blood sugar, taking medicines if prescribed, and getting regular exercise. · Do not smoke. Smoking affects blood flow and can make foot problems worse. If you need help quitting, talk to your doctor about stop-smoking programs and medicines.  These can increase your chances of quitting for good.  · Eat a diet that is low in fats. High fat intake can cause fat to build up in your blood vessels and decrease blood flow. · Inspect your feet daily for blisters, cuts, cracks, or sores. If you cannot see well, use a mirror or have someone help you. · Take care of your feet:  [facility] your feet every day. Use warm (not hot) water. Check the water temperature with your wrists or other part of your body, not your feet. ¨ Dry your feet well. Pat them dry. Do not rub the skin on your feet too hard. Dry well between your toes. If the skin on your feet stays moist, bacteria or a fungus can grow, which can lead to infection. ¨ Keep your skin soft. Use moisturizing skin cream to keep the skin on your feet soft and prevent calluses and cracks. But do not put the cream between your toes, and stop using any cream that causes a rash. ¨ Clean underneath your toenails carefully. Do not use a sharp object to clean underneath your toenails. Use the blunt end of a nail file or other rounded tool. ¨ Trim and file your toenails straight across to prevent ingrown toenails. Use a nail clipper, not scissors. Use an emery board to smooth the edges. · Change socks daily. Socks without seams are best, because seams often rub the feet. You can find socks for people with diabetes from specialty catalogs. · Look inside your shoes every day for things like gravel or torn linings, which could cause blisters or sores. · Buy shoes that fit well:  ¨ Look for shoes that have plenty of space around the toes. This helps prevent bunions and blisters. ¨ Try on shoes while wearing the kind of socks you will usually wear with the shoes. ¨ Avoid plastic shoes. They may rub your feet and cause blisters. Good shoes should be made of materials that are flexible and breathable, such as leather or cloth. ¨ Break in new shoes slowly by wearing them for no more than an hour a day for several days.  Take extra time to check your feet for red areas, blisters, or other problems after you wear new shoes. · Do not go barefoot. Do not wear sandals, and do not wear shoes with very thin soles. Thin soles are easy to puncture. They also do not protect your feet from hot pavement or cold weather. · Have your doctor check your feet during each visit. If you have a foot problem, see your doctor. Do not try to treat an early foot problem at home. Home remedies or treatments that you can buy without a prescription (such as corn removers) can be harmful. · Always get early treatment for foot problems. A minor irritation can lead to a major problem if not properly cared for early. When should you call for help? Call your doctor now or seek immediate medical care if:  · You have a foot sore, an ulcer or break in the skin that is not healing after 4 days, bleeding corns or calluses, or an ingrown toenail. · You have blue or black areas, which can mean bruising or blood flow problems. · You have peeling skin or tiny blisters between your toes or cracking or oozing of the skin. · You have a fever for more than 24 hours and a foot sore. · You have new numbness or tingling in your feet that does not go away after you move your feet or change positions. · You have unexplained or unusual swelling of the foot or ankle. Watch closely for changes in your health, and be sure to contact your doctor if:  · You cannot do proper foot care. Where can you learn more? Go to http://charles-kristin.info/. Enter A739 in the search box to learn more about \"Diabetes Foot Health: Care Instructions. \"  Current as of: May 23, 2016  Content Version: 11.2  © 1823-3945 [employer]. Care instructions adapted under license by [employer] (which disclaims liability or warranty for this information).  If you have questions about a medical condition or this instruction, always ask your healthcare professional. [employer] disclaims any warranty or liability for your use of this information. Learning About Diabetes Food Guidelines  Your Care Instructions  Meal planning is important to manage diabetes. It helps keep your blood sugar at a target level (which you set with your doctor). You don't have to eat special foods. You can eat what your family eats, including sweets once in a while. But you do have to pay attention to how often you eat and how much you eat of certain foods. You may want to work with a dietitian or a certified diabetes educator (CDE) to help you plan meals and snacks. A dietitian or CDE can also help you lose weight if that is one of your goals. What should you know about eating carbs? Managing the amount of carbohydrate (carbs) you eat is an important part of healthy meals when you have diabetes. Carbohydrate is found in many foods. · Learn which foods have carbs. And learn the amounts of carbs in different foods. ¨ Bread, cereal, pasta, and rice have about 15 grams of carbs in a serving. A serving is 1 slice of bread (1 ounce), ½ cup of cooked cereal, or 1/3 cup of cooked pasta or rice. ¨ Fruits have 15 grams of carbs in a serving. A serving is 1 small fresh fruit, such as an apple or orange; ½ of a banana; ½ cup of cooked or canned fruit; ½ cup of fruit juice; 1 cup of melon or raspberries; or 2 tablespoons of dried fruit. ¨ Milk and no-sugar-added yogurt have 15 grams of carbs in a serving. A serving is 1 cup of milk or 2/3 cup of no-sugar-added yogurt. ¨ Starchy vegetables have 15 grams of carbs in a serving. A serving is ½ cup of mashed potatoes or sweet potato; 1 cup winter squash; ½ of a small baked potato; ½ cup of cooked beans; or ½ cup cooked corn or green peas. · Learn how much carbs to eat each day and at each meal. A dietitian or CDE can teach you how to keep track of the amount of carbs you eat. This is called carbohydrate counting.   · If you are not sure how to count carbohydrate grams, use the Plate Method to plan meals. It is a good, quick way to make sure that you have a balanced meal. It also helps you spread carbs throughout the day. ¨ Divide your plate by types of foods. Put non-starchy vegetables on half the plate, meat or other protein food on one-quarter of the plate, and a grain or starchy vegetable in the final quarter of the plate. To this you can add a small piece of fruit and 1 cup of milk or yogurt, depending on how many carbs you are supposed to eat at a meal.  · Try to eat about the same amount of carbs at each meal. Do not \"save up\" your daily allowance of carbs to eat at one meal.  · Proteins have very little or no carbs per serving. Examples of proteins are beef, chicken, turkey, fish, eggs, tofu, cheese, cottage cheese, and peanut butter. A serving size of meat is 3 ounces, which is about the size of a deck of cards. Examples of meat substitute serving sizes (equal to 1 ounce of meat) are 1/4 cup of cottage cheese, 1 egg, 1 tablespoon of peanut butter, and ½ cup of tofu. How can you eat out and still eat healthy? · Learn to estimate the serving sizes of foods that have carbohydrate. If you measure food at home, it will be easier to estimate the amount in a serving of restaurant food. · If the meal you order has too much carbohydrate (such as potatoes, corn, or baked beans), ask to have a low-carbohydrate food instead. Ask for a salad or green vegetables. · If you use insulin, check your blood sugar before and after eating out to help you plan how much to eat in the future. · If you eat more carbohydrate at a meal than you had planned, take a walk or do other exercise. This will help lower your blood sugar. What else should you know? · Limit saturated fat, such as the fat from meat and dairy products. This is a healthy choice because people who have diabetes are at higher risk of heart disease. So choose lean cuts of meat and nonfat or low-fat dairy products.  Use olive or canola oil instead of butter or shortening when cooking. · Don't skip meals. Your blood sugar may drop too low if you skip meals and take insulin or certain medicines for diabetes. · Check with your doctor before you drink alcohol. Alcohol can cause your blood sugar to drop too low. Alcohol can also cause a bad reaction if you take certain diabetes medicines. Follow-up care is a key part of your treatment and safety. Be sure to make and go to all appointments, and call your doctor if you are having problems. It's also a good idea to know your test results and keep a list of the medicines you take. Where can you learn more? Go to http://charlse-kristin.info/. Enter N547 in the search box to learn more about \"Learning About Diabetes Food Guidelines. \"  Current as of: May 23, 2016  Content Version: 11.2  © 6307-5780 [employer]. Care instructions adapted under license by [employer] (which disclaims liability or warranty for this information). If you have questions about a medical condition or this instruction, always ask your healthcare professional. [address] any warranty or liability for your use of this information. Learning About Colonoscopy  What is a colonoscopy? A colonoscopy is a test (also called a procedure) that lets a doctor look inside your large intestine. The doctor uses a thin, lighted tube called a colonoscope. The doctor uses it to look for small growths called polyps, colon or rectal cancer (colorectal cancer), or other problems like bleeding. During the procedure, the doctor can take samples of tissue. The samples can then be checked for cancer or other conditions. The doctor can also take out polyps. How is colonoscopy done? This procedure is done in a doctor's office or a clinic or hospital. You will get medicine to help you relax and not feel pain.  Some people find that they do not remember having the test because of the medicine. The doctor gently moves the colonoscope, or scope, through the colon. The scope is also a small video camera. It lets the doctor see the colon and take pictures. A colonoscopy usually takes 30 to 45 minutes. It may take longer if the doctor has to remove polyps. How do you prepare for the procedure? You need to clean out your colon before the procedure so the doctor can see all of your colon. You may start the cleaning process a day or two before the test. This depends on which \"colon prep\" your doctor recommends. To clean your colon, you stop eating solid foods and drink only clear liquids. You can have water, tea, coffee, clear juices, clear broths, flavored ice pops, and gelatin (such as Jell-O). Do not drink anything red or purple, such as grape juice or fruit punch. And do not eat red or purple foods, such as grape ice pops or cherry gelatin. The day or night before the procedure, you drink a large amount of a special liquid. This causes loose, frequent stools. You will go to the bathroom a lot. It is very important to drink all of the colon prep liquid. If you have problems drinking the liquid, call your doctor. For many people, the prep is worse than the test. It may be uncomfortable, and you may feel hungry on the clear liquid diet. Some people do not go to work or do their usual activities on the day of the prep. Arrange to have someone take you home after the test.  What can you expect after a colonoscopy? The nurses will watch you for 1 to 2 hours until the medicines wear off. Then you can go home. You will need a ride. Your doctor will tell you when you can eat and do your usual activities. Your doctor will talk to you about when you will need your next colonoscopy. The results of your test and your risk for colorectal cancer will help your doctor decide how often you need to be checked. Follow-up care is a key part of your treatment and safety.  Be sure to make and go to all appointments, and call your doctor if you are having problems. It's also a good idea to know your test results and keep a list of the medicines you take. Where can you learn more? Go to http://charles-kristin.info/. Enter J959 in the search box to learn more about \"Learning About Colonoscopy. \"  Current as of: July 26, 2016  Content Version: 11.2  © 9359-0966 [employer]. Care instructions adapted under license by [employer] (which disclaims liability or warranty for this information). If you have questions about a medical condition or this instruction, always ask your healthcare professional. [place] [address] any warranty or liability for your use of this information. Heart-Healthy Diet: Care Instructions  Your Care Instructions    A heart-healthy diet has lots of vegetables, fruits, nuts, beans, and whole grains, and is low in salt. It limits foods that are high in saturated fat, such as meats, cheeses, and fried foods. It may be hard to change your diet, but even small changes can lower your risk of heart attack and heart disease. Follow-up care is a key part of your treatment and safety. Be sure to make and go to all appointments, and call your doctor if you are having problems. It's also a good idea to know your test results and keep a list of the medicines you take. How can you care for yourself at home? Watch your portions  · Learn what a serving is. A \"serving\" and a \"portion\" are not always the same thing. Make sure that you are not eating larger portions than are recommended. For example, a serving of pasta is ½ cup. A serving size of meat is 2 to 3 ounces. A 3-ounce serving is about the size of a deck of cards. Measure serving sizes until you are good at [place]" them. Keep in mind that restaurants often serve portions that are 2 or 3 times the size of one serving.   · To keep your energy level up and keep you from feeling hungry, eat often but in smaller portions. · Eat only the number of calories you need to stay at a healthy weight. If you need to lose weight, eat fewer calories than your body burns (through exercise and other physical activity). Eat more fruits and vegetables  · Eat a variety of fruit and vegetables every day. Dark green, deep orange, red, or yellow fruits and vegetables are especially good for you. Examples include spinach, carrots, peaches, and berries. · Keep carrots, celery, and other veggies handy for snacks. Buy fruit that is in season and store it where you can see it so that you will be tempted to eat it. · Cook dishes that have a lot of veggies in them, such as stir-fries and soups. Limit saturated and trans fat  · Read food labels, and try to avoid saturated and trans fats. They increase your risk of heart disease. Trans fat is found in many processed foods such as cookies and crackers. · Use olive or canola oil when you cook. Try cholesterol-lowering spreads, such as Benecol or Take Control. · Bake, broil, grill, or steam foods instead of frying them. · Choose lean meats instead of high-fat meats such as hot dogs and sausages. Cut off all visible fat when you prepare meat. · Eat fish, skinless poultry, and meat alternatives such as soy products instead of high-fat meats. Soy products, such as tofu, may be especially good for your heart. · Choose low-fat or fat-free milk and dairy products. Eat fish  · Eat at least two servings of fish a week. Certain fish, such as salmon and tuna, contain omega-3 fatty acids, which may help reduce your risk of heart attack. Eat foods high in fiber  · Eat a variety of grain products every day. Include whole-grain foods that have lots of fiber and nutrients. Examples of whole-grain foods include oats, whole wheat bread, and brown rice. · Buy whole-grain breads and cereals, instead of white bread or pastries.   Limit salt and sodium  · Limit how much salt and sodium you eat to help lower your blood pressure. · Taste food before you salt it. Add only a little salt when you think you need it. With time, your taste buds will adjust to less salt. · Eat fewer snack items, fast foods, and other high-salt, processed foods. Check food labels for the amount of sodium in packaged foods. · Choose low-sodium versions of canned goods (such as soups, vegetables, and beans). Limit sugar  · Limit drinks and foods with added sugar. These include candy, desserts, and soda pop. Limit alcohol  · Limit alcohol to no more than 2 drinks a day for men and 1 drink a day for women. Too much alcohol can cause health problems. When should you call for help? Watch closely for changes in your health, and be sure to contact your doctor if:  · You would like help planning heart-healthy meals. Where can you learn more? Go to http://charlesTRIXandTRAXkristin.info/. Enter V137 in the search box to learn more about \"Heart-Healthy Diet: Care Instructions. \"  Current as of: January 27, 2016  Content Version: 11.2  © 7374-7461 [employer]. Care instructions adapted under license by [employer] (which disclaims liability or warranty for this information). If you have questions about a medical condition or this instruction, always ask your healthcare professional. [place] [address] any warranty or liability for your use of this information. Advance Directives: Care Instructions  Your Care Instructions  An advance directive is a legal way to state your wishes at the end of your life. It tells your family and your doctor what to do if you can no longer say what you want. There are two main types of advance directives. You can change them any time that your wishes change. · A living will tells your family and your doctor your wishes about life support and other treatment.   · A durable power of  for health care lets you name a person to make treatment decisions for you when you can't speak for yourself. This person is called a health care agent. If you do not have an advance directive, decisions about your medical care may be made by a doctor or a  who doesn't know you. It may help to think of an advance directive as a gift to the people who care for you. If you have one, they won't have to make tough decisions by themselves. Follow-up care is a key part of your treatment and safety. Be sure to make and go to all appointments, and call your doctor if you are having problems. It's also a good idea to know your test results and keep a list of the medicines you take. How can you care for yourself at home? · Discuss your wishes with your loved ones and your doctor. This way, there are no surprises. · Many states have a unique form. Or you might use a universal form that has been approved by many states. This kind of form can sometimes be completed and stored online. Your electronic copy will then be available wherever you have a connection to the Internet. In most cases, doctors will respect your wishes even if you have a form from a different state. · You don't need a  to do an advance directive. But you may want to get legal advice. · Think about these questions when you prepare an advance directive:  ¨ Who do you want to make decisions about your medical care if you are not able to? Many people choose a family member or close friend. ¨ Do you know enough about life support methods that might be used? If not, talk to your doctor so you understand. ¨ What are you most afraid of that might happen? You might be afraid of having pain, losing your independence, or being kept alive by machines. ¨ Where would you prefer to die? Choices include your home, a hospital, or a nursing home. ¨ Would you like to have information about hospice care to support you and your family? ¨ Do you want to donate organs when you die?   ¨ Do you want certain Gnosticist practices performed before you die? If so, put your wishes in the advance directive. · Read your advance directive every year, and make changes as needed. When should you call for help? Be sure to contact your doctor if you have any questions. Where can you learn more? Go to http://charles-kristin.info/. Enter R264 in the search box to learn more about \"Advance Directives: Care Instructions. \"  Current as of: November 17, 2016  Content Version: 11.2  © 2604-8923 [employer]. Care instructions adapted under license by [employer] (which disclaims liability or warranty for this information). If you have questions about a medical condition or this instruction, always ask your healthcare professional. [address] any warranty or liability for your use of this information.

## 2017-05-17 NOTE — PROGRESS NOTES
This is an Initial Medicare Annual Wellness Exam (AWV) (Performed 12 months after IPPE or effective date of Medicare Part B enrollment, Once in a lifetime)    I have reviewed the patient's medical history in detail and updated the computerized patient record. History     Past Medical History:   Diagnosis Date    Diabetes ([facility]     Gallstone     Hypertension     Invasive carcinoma of breast ([facility] 1/31/2017      Past Surgical History:   Procedure Laterality Date    HX GYN  1984    hysterectomy - Fibroids     Current Outpatient Prescriptions   Medication Sig Dispense Refill    olmesartan-hydroCHLOROthiazide (BENICAR HCT) 40-12.5 mg per tablet Take 1 Tab by mouth daily.  metFORMIN (GLUCOPHAGE) 500 mg tablet TAKE 1 TAB BY MOUTH TWO (2) TIMES DAILY (WITH MEALS).  60 Tab 1    metoprolol succinate (TOPROL-XL) 25 mg XL tablet TAKE 1 TABLET BY MOUTH EVERY DAY 30 Tab 2     Allergies   Allergen Reactions    Penicillins Rash and Swelling     Family History   Problem Relation Age of Onset    Bleeding Prob Mother      Anyerism- Brain    Diabetes Father     Other Father      hep C    Stroke Sister     No Known Problems Brother     No Known Problems Brother     No Known Problems Brother     No Known Problems Brother     No Known Problems Brother     Lupus Sister     No Known Problems Sister     No Known Problems Sister     No Known Problems Sister    [de-identified] Migraines Daughter     No Known Problems Daughter      Social History   Substance Use Topics    Smoking status: Never Smoker    Smokeless tobacco: Not on file    Alcohol use No     Patient Active Problem List   Diagnosis Code    Essential hypertension I10    Controlled type 2 diabetes mellitus without complication, without long-term current use of insulin (HCC) E11.9    Invasive carcinoma of breast ([facility] C50.919         Depression Risk Factor Screening:     PHQ over the last two weeks 5/17/2017   Little interest or pleasure in doing things Not at all   Feeling down, depressed or hopeless Not at all   Total Score PHQ 2 0     Alcohol Risk Factor Screening: On any occasion during the past 3 months, have you had more than 3 drinks containing alcohol? No    Do you average more than 7 drinks per week? No    Functional Ability and Level of Safety:     Hearing Loss   none    Activities of Daily Living   Self-care. Requires assistance with: no ADLs    Fall Risk     Fall Risk Assessment, last 12 mths 5/17/2017   Able to walk? Yes   Fall in past 12 months? No     Abuse Screen   Patient is not abused    Review of Systems   A comprehensive review of systems was negative. Physical Examination   Physical Exam   Constitutional: She appears well-nourished. No distress. HENT:   Right Ear: External ear normal.   Left Ear: External ear normal.   Nose: Nose normal.   Mouth/Throat: Oropharynx is clear and moist. No oropharyngeal exudate. Eyes: Conjunctivae and EOM are normal. Pupils are equal, round, and reactive to light. Neck: Neck supple. No thyromegaly present. Cardiovascular: Normal rate, regular rhythm, normal heart sounds and intact distal pulses. Exam reveals no gallop and no friction rub. No murmur heard. Pulmonary/Chest: Effort normal and breath sounds normal. She has no wheezes. She has no rales. Abdominal: Soft. Bowel sounds are normal. She exhibits no distension and no mass. There is no tenderness. Musculoskeletal: Normal range of motion. Lymphadenopathy:     She has no cervical adenopathy. Neurological: She is alert. She has normal reflexes. She displays normal reflexes. She exhibits normal muscle tone. Coordination normal.   Skin: No rash noted. Psychiatric: She has a normal mood and affect.  Her behavior is normal. Judgment and thought content normal.          Diabetic foot exam performed by Gavin Chery MD     Measurement  Response   Monofilament  R - normal sensation with micro filament  L - normal sensation with micro filament   Pulse DP R - present  L - present   Pulse TP R - present  L - present   Structural deformity R - None  L - None   Skin Integrity / Deformity R - None  L - None              Evaluation of Cognitive Function:  Mood/affect:  happy  Appearance: age appropriate  Family member/caregiver input: n/a      Patient Care Team:  Piter Salmon MD as PCP - General (Family Practice)    Advice/Referrals/Counseling   Education and counseling provided:  Are appropriate based on today's review and evaluation  End-of-Life planning (with patient's consent)  Colorectal cancer screening tests  Bone mass measurement (DEXA)  Medical nutrition therapy for individuals with diabetes or renal disease      Assessment/Plan   1. Initial Medicare annual wellness visit  - Dexa Bone Density Study Axial ([id number]); Future    2. Encounter for hepatitis C screening test for low risk patient  - HEPATITIS C AB    3. Colon cancer screening  - OCCULT BLOOD, IMMUNOASSAY (FIT)    4. Screening for alcoholism  5. Screening for depression  - Depression Screen Annual    6. Screen for colon cancer  7. Encounter for immunization    - varicella zoster vacine live (ZOSTAVAX) 19,400 unit/0.65 mL susr injection; 1 Vial by SubCUTAneous route once for 1 dose. Dispense: 0.65 mL; Refill: 0    8. Need for Tdap vaccination    9. Controlled type 2 diabetes mellitus without complication, without long-term current use of insulin (HCC)  - HEMOGLOBIN A1C WITH EAG  -  DIABETES FOOT EXAM  - METABOLIC PANEL, BASIC    10. Essential hypertension  - METABOLIC PANEL, BASIC    11. Postmenopausal  - Dexa Bone Density Study Axial ([id number]); Future      I have discussed the diagnosis with the patient and the intended plan as seen in the above orders. she has expressed understanding. The patient has received an after-visit summary and questions were answered concerning future plans.   I have discussed medication side effects and warnings with the patient as well.    Follow-up Disposition:  Return in about 6 weeks (around 6/28/2017).     Electronically Signed: Zayra Mckeon MD

## 2017-05-18 LAB
BUN SERPL-MCNC: 19 MG/DL (ref 8–27)
BUN/CREAT SERPL: 23 (ref 12–28)
CALCIUM SERPL-MCNC: 10.5 MG/DL (ref 8.7–10.3)
CHLORIDE SERPL-SCNC: 104 MMOL/L (ref 96–106)
CO2 SERPL-SCNC: 25 MMOL/L (ref 18–29)
CREAT SERPL-MCNC: 0.83 MG/DL (ref 0.57–1)
EST. AVERAGE GLUCOSE BLD GHB EST-MCNC: 143 MG/DL
GLUCOSE SERPL-MCNC: 83 MG/DL (ref 65–99)
HBA1C MFR BLD: 6.6 % (ref 4.8–5.6)
HCV AB S/CO SERPL IA: <0.1 S/CO RATIO (ref 0–0.9)
POTASSIUM SERPL-SCNC: 4.3 MMOL/L (ref 3.5–5.2)
SODIUM SERPL-SCNC: 146 MMOL/L (ref 134–144)

## 2017-05-25 ENCOUNTER — HOSPITAL ENCOUNTER (OUTPATIENT)
Dept: MAMMOGRAPHY | Age: 67
Discharge: HOME | End: 2017-05-25
Attending: FAMILY MEDICINE
Payer: MEDICARE

## 2017-05-25 DIAGNOSIS — Z78.0 POSTMENOPAUSAL: ICD-10-CM

## 2017-05-25 DIAGNOSIS — Z00.00 INITIAL MEDICARE ANNUAL WELLNESS VISIT: ICD-10-CM

## 2017-05-25 PROCEDURE — 77080 DXA BONE DENSITY AXIAL: CPT

## 2017-05-29 ENCOUNTER — TELEPHONE (OUTPATIENT)
Dept: FAMILY MEDICINE CLINIC | Age: 67
End: 2017-05-29

## 2017-05-29 NOTE — TELEPHONE ENCOUNTER
Pt called requesting prednisone for diffuse skin rash. Stated she was having pruritus/ hives diffusely over her body. No issues with breathing or angioedema. Pt unsure of what caused her to break out. Tried Zyrtec 1 hour ago, but still with pruritus. Does not take Benadryl b/c it makes her more nervous. Advised pt to schedule an appointment for Tuesday 5/30 to be examined.      Jaylyn Mckinney MD  5/29/17 10:29 AM

## 2017-05-30 ENCOUNTER — OFFICE VISIT (OUTPATIENT)
Dept: FAMILY MEDICINE CLINIC | Age: 67
End: 2017-05-30

## 2017-05-30 VITALS
HEIGHT: 63 IN | SYSTOLIC BLOOD PRESSURE: 143 MMHG | OXYGEN SATURATION: 100 % | BODY MASS INDEX: 26.58 KG/M2 | TEMPERATURE: 98 F | DIASTOLIC BLOOD PRESSURE: 84 MMHG | WEIGHT: 150 LBS | HEART RATE: 94 BPM | RESPIRATION RATE: 16 BRPM

## 2017-05-30 DIAGNOSIS — R21 RASH: Primary | ICD-10-CM

## 2017-05-30 RX ORDER — LORATADINE 10 MG/1
10 TABLET ORAL DAILY
Qty: 30 TAB | Refills: 1 | Status: SHIPPED | OUTPATIENT
Start: 2017-05-30 | End: 2017-08-07 | Stop reason: SDUPTHER

## 2017-05-30 RX ORDER — METHYLPREDNISOLONE 4 MG/1
TABLET ORAL
Qty: 1 DOSE PACK | Refills: 0 | Status: SHIPPED | OUTPATIENT
Start: 2017-05-30 | End: 2017-07-13 | Stop reason: SDUPTHER

## 2017-05-30 NOTE — PATIENT INSTRUCTIONS
Methylprednisolone (By mouth)   Methylprednisolone (meth-il-pred-NIS-oh-lone)  Treats many diseases and conditions, including problems related to inflammation. This medicine is a corticosteroid. Brand Name(s): Medrol, Medrol Dosepak   There may be other brand names for this medicine. When This Medicine Should Not Be Used: This medicine is not right for everyone. Do not use it if you had an allergic reaction to methylprednisolone or if you have a fungus infection. How to Use This Medicine:   Tablet  · Take your medicine as directed. Your dose may need to be changed several times to find what works best for you. · If you are using this medicine for an ongoing illness, your dose may need to be changed occasionally. Some people take this medicine only every other day, which helps to decrease side effects. · Take your medicine in the morning, unless your doctor tells you otherwise. · It is best to take this medicine with food or milk. · Missed dose: Take a dose as soon as you remember. If it is almost time for your next dose, wait until then and take a regular dose. Do not take extra medicine to make up for a missed dose. · Store the medicine in a closed container at room temperature, away from heat, moisture, and direct light. Drugs and Foods to Avoid:   Ask your doctor or pharmacist before using any other medicine, including over-the-counter medicines, vitamins, and herbal products. · Some foods and medicines can affect how methylprednisolone works. Tell your doctor if you use any of the following:  ¨ Cyclosporine  ¨ Phenobarbital, phenytoin  ¨ Rifampin  ¨ Ketoconazole  ¨ Aspirin, especially high doses  ¨ Blood thinner, such as warfarin  ¨ Diabetes medicine  · This medicine may interfere with vaccines. Ask your doctor before you get a flu shot or any other vaccines.   Warnings While Using This Medicine:   · Tell your doctor if you are pregnant or breastfeeding, or if you have kidney problems, liver disease, heart failure, high blood pressure, osteoporosis, blood clotting problems, or thyroid problems. Also tell your doctor if you have had mental or emotional problems (such as depression) or stomach or bowel problems (such as an ulcer or diverticulitis). · This medicine may cause the following problems:  ¨ Mood or behavior changes  ¨ Higher blood pressure, retaining water, changes in salt or potassium levels  ¨ Cataracts or glaucoma (with long-term use)  ¨ Slow growth in children (with long-term use)  · Do not stop using this medicine suddenly. Your doctor will need to slowly decrease your dose before you stop it completely. · This medicine could cause you to get infections more easily. Tell your doctor right away if you have an infection or if you are exposed to chickenpox, measles, or other serious infection. Tell your doctor if you had a serious infection in the past, such as tuberculosis, herpes, or Strongyloides (threadworm). · Tell your doctor about any extra stress or anxiety in your life. Your dose might need to be changed for a short time. · Tell any doctor or dentist who treats you that you are using this medicine. · Keep all medicine out of the reach of children. Never share your medicine with anyone.   Possible Side Effects While Using This Medicine:   Call your doctor right away if you notice any of these side effects:  · Allergic reaction: Itching or hives, swelling in your face or hands, swelling or tingling in your mouth or throat, chest tightness, trouble breathing  · Dark freckles, skin color changes, coldness, weakness, tiredness, nausea, vomiting, weight loss  · Depression, unusual thoughts, feelings, or behaviors, trouble sleeping  · Fever, chills, cough, sore throat, and body aches  · Severe stomach pain, nausea, vomiting, or red or black stools  · Skin changes or growths  · Swelling in your hands, ankles, or feet, rapid weight gain  · Trouble seeing, eye pain, headache  If you notice these less serious side effects, talk with your doctor:   · Increased appetite  · Round, puffy face  · Weight gain around your neck, upper back, breast, face, or waist  If you notice other side effects that you think are caused by this medicine, tell your doctor. Call your doctor for medical advice about side effects. You may report side effects to FDA at 0-724-FDA-0167  © 2017 [address]  Information is for End User's use only and may not be sold, redistributed or otherwise used for commercial purposes. The above information is an  only. It is not intended as medical advice for individual conditions or treatments. Talk to your doctor, nurse or pharmacist before following any medical regimen to see if it is safe and effective for you.

## 2017-05-30 NOTE — MR AVS SNAPSHOT
Visit Information Date & Time Provider Department Dept. Phone Encounter #  
 5/30/2017  9:50 AM Earl Josue MD [address] [place] [phone] [id number] Your Appointments 6/27/2017 10:20 AM  
ROUTINE CARE with Aysha Martin MD  
[facility]) Appt Note: fv from 05/17/17  
 [address] [place] [address]. [address] [place] [address]  
[phone]  
  
   
 [address] [facility] [id number] Upcoming Health Maintenance Date Due  
 EYE EXAM RETINAL OR DILATED Q1 8/15/1960 DTaP/Tdap/Td series (1 - Tdap) 8/15/1971 FOBT Q 1 YEAR AGE 50-75 8/15/2000 ZOSTER VACCINE AGE 60> 8/15/2010 GLAUCOMA SCREENING Q2Y 8/15/2015 INFLUENZA AGE 9 TO ADULT 8/1/2017 MICROALBUMIN Q1 11/7/2017 LIPID PANEL Q1 11/7/2017 HEMOGLOBIN A1C Q6M 11/17/2017 Pneumococcal 65+ Low/Medium Risk (2 of 2 - PPSV23) 11/18/2017 FOOT EXAM Q1 5/17/2018 MEDICARE YEARLY EXAM 5/18/2018 BREAST CANCER SCRN MAMMOGRAM 1/9/2019 Allergies as of 5/30/2017  Review Complete On: 5/30/2017 By: Earl Josue MD  
  
 Severity Noted Reaction Type Reactions Penicillins  11/07/2016   Systemic Rash, Swelling Current Immunizations  Never Reviewed Name Date Influenza High Dose Vaccine PF 11/7/2016 Not reviewed this visit You Were Diagnosed With   
  
 Codes Comments Wheal    -  Primary ICD-10-CM: L50.9 ICD-9-CM: 709.8 Vitals BP Pulse Temp Resp Height(growth percentile) Weight(growth percentile) 143/84 94 98 °F (36.7 °C) (Oral) 16 5' 3\" (1.6 m) 150 lb (68 kg) SpO2 BMI OB Status Smoking Status 100% 26.57 kg/m2 Hysterectomy Never Smoker Vitals History BMI and BSA Data Body Mass Index Body Surface Area  
 26.57 kg/m 2 1.74 m 2 Preferred Pharmacy Pharmacy Name Phone [id number] [place] , [address] [facility] [phone] Your Updated Medication List  
  
   
This list is accurate as of: 5/30/17 10:15 AM.  Always use your most recent med list.  
  
  
  
  
 loratadine 10 mg tablet Commonly known as:  Arleta Pals Take 1 Tab by mouth daily. metFORMIN 500 mg tablet Commonly known as:  GLUCOPHAGE  
TAKE 1 TAB BY MOUTH TWO (2) TIMES DAILY (WITH MEALS). methylPREDNISolone 4 mg tablet Commonly known as:  Ferne Copper Take it as directed. metoprolol succinate 25 mg XL tablet Commonly known as:  TOPROL-XL  
TAKE 1 TABLET BY MOUTH EVERY DAY  
  
 olmesartan-hydroCHLOROthiazide 40-12.5 mg per tablet Commonly known as:  BENICAR HCT Take 1 Tab by mouth daily. Prescriptions Sent to Pharmacy Refills  
 methylPREDNISolone (MEDROL DOSEPACK) 4 mg tablet 0 Sig: Take it as directed. Class: Normal  
 Pharmacy: [facility] #[id number] Ph #: [phone]  
 loratadine (CLARITIN) 10 mg tablet 1 Sig: Take 1 Tab by mouth daily. Class: Normal  
 Pharmacy: [facility]/pharmacy #[id number] [place] [address] Ph #: [phone] Route: Oral  
  
Patient Instructions Methylprednisolone (By mouth) Methylprednisolone (meth-il-pred-NIS-oh-lone) Treats many diseases and conditions, including problems related to inflammation. This medicine is a corticosteroid. Brand Name(s): Medrol, Medrol Dosepak There may be other brand names for this medicine. When This Medicine Should Not Be Used: This medicine is not right for everyone. Do not use it if you had an allergic reaction to methylprednisolone or if you have a fungus infection. How to Use This Medicine:  
Tablet · Take your medicine as directed. Your dose may need to be changed several times to find what works best for you.  
· If you are using this medicine for an ongoing illness, your dose may need to be changed occasionally. Some people take this medicine only every other day, which helps to decrease side effects. · Take your medicine in the morning, unless your doctor tells you otherwise. · It is best to take this medicine with food or milk. · Missed dose: Take a dose as soon as you remember. If it is almost time for your next dose, wait until then and take a regular dose. Do not take extra medicine to make up for a missed dose. · Store the medicine in a closed container at room temperature, away from heat, moisture, and direct light. Drugs and Foods to Avoid: Ask your doctor or pharmacist before using any other medicine, including over-the-counter medicines, vitamins, and herbal products. · Some foods and medicines can affect how methylprednisolone works. Tell your doctor if you use any of the following: ¨ Cyclosporine ¨ Phenobarbital, phenytoin ¨ Rifampin ¨ Ketoconazole ¨ Aspirin, especially high doses ¨ Blood thinner, such as warfarin ¨ Diabetes medicine · This medicine may interfere with vaccines. Ask your doctor before you get a flu shot or any other vaccines. Warnings While Using This Medicine: · Tell your doctor if you are pregnant or breastfeeding, or if you have kidney problems, liver disease, heart failure, high blood pressure, osteoporosis, blood clotting problems, or thyroid problems. Also tell your doctor if you have had mental or emotional problems (such as depression) or stomach or bowel problems (such as an ulcer or diverticulitis). · This medicine may cause the following problems: ¨ Mood or behavior changes ¨ Higher blood pressure, retaining water, changes in salt or potassium levels ¨ Cataracts or glaucoma (with long-term use) ¨ Slow growth in children (with long-term use) · Do not stop using this medicine suddenly. Your doctor will need to slowly decrease your dose before you stop it completely. · This medicine could cause you to get infections more easily. Tell your doctor right away if you have an infection or if you are exposed to chickenpox, measles, or other serious infection. Tell your doctor if you had a serious infection in the past, such as tuberculosis, herpes, or Strongyloides (threadworm). · Tell your doctor about any extra stress or anxiety in your life. Your dose might need to be changed for a short time. · Tell any doctor or dentist who treats you that you are using this medicine. · Keep all medicine out of the reach of children. Never share your medicine with anyone. Possible Side Effects While Using This Medicine:  
Call your doctor right away if you notice any of these side effects: · Allergic reaction: Itching or hives, swelling in your face or hands, swelling or tingling in your mouth or throat, chest tightness, trouble breathing · Dark freckles, skin color changes, coldness, weakness, tiredness, nausea, vomiting, weight loss · Depression, unusual thoughts, feelings, or behaviors, trouble sleeping · Fever, chills, cough, sore throat, and body aches · Severe stomach pain, nausea, vomiting, or red or black stools · Skin changes or growths · Swelling in your hands, ankles, or feet, rapid weight gain · Trouble seeing, eye pain, headache If you notice these less serious side effects, talk with your doctor: · Increased appetite · Round, puffy face · Weight gain around your neck, upper back, breast, face, or waist 
If you notice other side effects that you think are caused by this medicine, tell your doctor. Call your doctor for medical advice about side effects. You may report side effects to FDA at 2-904-FDA-2910 © 2017 [address] Information is for End User's use only and may not be sold, redistributed or otherwise used for commercial purposes. The above information is an  only.  It is not intended as medical advice for individual conditions or treatments. Talk to your doctor, nurse or pharmacist before following any medical regimen to see if it is safe and effective for you. Please provide this summary of care documentation to your next provider. Your primary care clinician is listed as Eren Alegre. If you have any questions after today's visit, please call [phone].

## 2017-05-30 NOTE — PROGRESS NOTES
Subjective:      Rima Coley is an 77 y.o. female who presents for evaluation of skin rash. .    Onset: 1 day ago  Frequency : constant  Progress : unchanged  Accompanied Symptoms : itching, rash. Pain : no  Alleviation : loratadine. She moved to [facility] from [place] 2 years ago. Since then, she has several allergic episodes. This episode started a day ago. Itching on bilateral shoulders, upper chest, upper and lower backs. Denies fever, chills, pain, SOB, tongue swelling. Review of Systems   Constitutional: Negative for chills and fever. Respiratory: Negative for shortness of breath. Cardiovascular: Negative for chest pain. Skin: Positive for itching and rash. Neurological: Negative for headaches. Past Medical History:   Diagnosis Date    Diabetes ([facility]     Gallstone     Hypertension     Invasive carcinoma of breast ([facility] 1/31/2017     Past Surgical History:   Procedure Laterality Date    HX GYN  1984    hysterectomy - Fibroids     Current Outpatient Prescriptions   Medication Sig Dispense Refill    olmesartan-hydroCHLOROthiazide (BENICAR HCT) 40-12.5 mg per tablet Take 1 Tab by mouth daily. 90 Tab 3    metoprolol succinate (TOPROL-XL) 25 mg XL tablet TAKE 1 TABLET BY MOUTH EVERY DAY 30 Tab 2    metFORMIN (GLUCOPHAGE) 500 mg tablet TAKE 1 TAB BY MOUTH TWO (2) TIMES DAILY (WITH MEALS).  60 Tab 1     Allergies   Allergen Reactions    Penicillins Rash and Swelling     Family History   Problem Relation Age of Onset    Bleeding Prob Mother      Anyerism- Brain    Diabetes Father     Other Father      hep C    Stroke Sister     No Known Problems Brother     No Known Problems Brother     No Known Problems Brother     No Known Problems Brother     No Known Problems Brother     Lupus Sister     No Known Problems Sister     No Known Problems Sister     No Known Problems Sister    Maykel Cypher Migraines Daughter     No Known Problems Daughter      Social History     Social History  Marital status:      Spouse name: N/A    Number of children: N/A    Years of education: N/A     Occupational History    Not on file. Social History Main Topics    Smoking status: Never Smoker    Smokeless tobacco: Not on file    Alcohol use No    Drug use: No    Sexual activity: Not Currently     Birth control/ protection: Surgical     Other Topics Concern    Not on file     Social History Narrative           Objective:     Visit Vitals    /84    Pulse 94    Temp 98 °F (36.7 °C) (Oral)    Resp 16    Ht 5' 3\" (1.6 m)    Wt 150 lb (68 kg)    SpO2 100%    BMI 26.57 kg/m2         Physical Exam:  General: calm, relaxed, appears nondistressed   Skin: Wheals on bilateral shoulders, upper chest and back. Excoriations. Nontender. HEENT:  normocephalic/atraumatic, no facial droop, PERLLA/EOMI, pharynx clear,  speech clear and fluent. Conjunctivae/Cornea clear. Assessment / Plan:     1. Rash  Wheals noted on exam. Likely due to allergy, but no known allergen. No signs of infection. Will give medrol dosepack and continue taking claritin. RTC prn  - methylPREDNISolone (MEDROL DOSEPACK) 4 mg tablet; Take it as directed. Dispense: 1 Dose Pack; Refill: 0  - loratadine (CLARITIN) 10 mg tablet; Take 1 Tab by mouth daily. Dispense: 30 Tab; Refill: 1      AVS was printed, given to patient and briefly discussed prior to patient's departure from the office today.      Dinh Goss MD  [facility] Medicine Resident  [address]

## 2017-06-04 PROBLEM — M85.89 OSTEOPENIA OF MULTIPLE SITES: Status: ACTIVE | Noted: 2017-06-04

## 2017-06-17 RX ORDER — METFORMIN HYDROCHLORIDE 500 MG/1
TABLET ORAL
Qty: 60 TAB | Refills: 1 | Status: SHIPPED | OUTPATIENT
Start: 2017-06-17 | End: 2017-09-22 | Stop reason: SDUPTHER

## 2017-06-27 ENCOUNTER — OFFICE VISIT (OUTPATIENT)
Dept: FAMILY MEDICINE CLINIC | Age: 67
End: 2017-06-27

## 2017-06-27 VITALS
OXYGEN SATURATION: 99 % | WEIGHT: 150 LBS | SYSTOLIC BLOOD PRESSURE: 123 MMHG | DIASTOLIC BLOOD PRESSURE: 77 MMHG | RESPIRATION RATE: 16 BRPM | HEIGHT: 63 IN | BODY MASS INDEX: 26.58 KG/M2 | TEMPERATURE: 97.8 F | HEART RATE: 72 BPM

## 2017-06-27 DIAGNOSIS — M85.89 OSTEOPENIA OF MULTIPLE SITES: Primary | ICD-10-CM

## 2017-06-27 RX ORDER — ALENDRONATE SODIUM 35 MG/1
35 TABLET ORAL
Qty: 13 TAB | Refills: 3 | Status: SHIPPED | OUTPATIENT
Start: 2017-06-27 | End: 2019-03-25 | Stop reason: SINTOL

## 2017-06-27 NOTE — PROGRESS NOTES
Estefania Madrid  77 y.o. female  1950  [address]  [facility] [address]  [id number]   [address]: Progress Note  Manual MD Juanjose       Encounter Date: 6/27/2017    Chief Complaint   Patient presents with    Follow-up    Hip Pain     sporadic--5/10     History of Present Illness   Estefania Madrid is a 77 y.o. female who presents to clinic today for follow-up on hip pain. Notes improvement in pain for the post part. Now is sportatic. Reviewed DEXA scan results with patient and recommendations for Bisphosponates. Ko Ramal DEXA Results (most recent):    Results from Hospital Encounter encounter on 05/25/17   DEXA BONE DENSITY STUDY AXIAL   Narrative Bone Mineral Density    Indication:  Screening  Age: 77  Sex: Female. Menopause status: Postmenopausal  Hormone replacement therapy: No     Number of falls in the past year:   None. Risk factors for osteoporosis:  Prilosec, fracture      Current medication for osteoporosis: Calcium and vitamin D. Comparison: None. Technique: Imaging was performed on the Etreasure[employer]. World Health Organization  meta-analysis fracture risk calculator (FRAX) analysis was performed for 10 year  fracture risk probability assessment    Excluded sites: 0     Findings:    Femoral Neck:  Right  Bone mineral density (gm/cm2):? 0.773  % of peak bone mass: 75  % for age matched controls:? 80  T-score: -1.9  Z-score: -1.3    Total Hip: Left  Bone mineral density (gm/cm2):  0.868  % of peak bone mass:   86  % for age matched controls:  80  T-score:   -1.1  Z-score:  -0.8    Lumbar Spine:  L1-4  Bone mineral density (gm/cm2):  0.947  % of peak bone mass:  80   % for age matched controls:  80  T-score:  -2  Z-score:  -1.1    T score the distal one third left radius -1.2         Impression Impression:     This patient is osteopenic using the World Health Organization criteria  10 year probability of major osteoporotic fracture:  29%  10 year probability of hip fracture:  15%    Recommendations:  Therapy recommendations need to be tailored to each individual patient. Using  the [address] [facility]) FRAX absolute fracture algorithm, the  [facility] recommends beginning pharmacological therapy in  postmenopausal women and men over the age of 48 with a 8 year probability of a  hip fracture of >3% OR with the 10 year probability of a major osteoporotic  fracture of >20%. Please reconsider testing based on risk factors. Currently, Medicare will only  reimburse for a central DXA examination every two years, unless the patient is  on chronic glucocorticoid therapy. Note: Please note that reliable, valid comparisons cannot be made between  studies which have been performed on machines from different manufacturers. If  clinically warranted, a follow up study performed at this site, on the same  unit, would allow the most sensitive assessment of change in bone mineral  density. Review of Systems   Review of Systems   Constitutional: Negative for chills and fever. HENT: Negative for congestion and sore throat. Eyes: Negative for blurred vision and double vision. Respiratory: Negative for cough, shortness of breath and wheezing. Cardiovascular: Negative for chest pain and palpitations. Gastrointestinal: Negative for abdominal pain, constipation, diarrhea and nausea. Genitourinary: Negative for dysuria and hematuria. Musculoskeletal: Negative for back pain, falls and myalgias. Skin: Negative for rash. Neurological: Negative for dizziness, tremors and headaches. Psychiatric/Behavioral: Negative for depression. The patient is not nervous/anxious. All other systems reviewed and are negative.       Vitals/Objective:     Vitals:    06/27/17 1024   BP: 123/77   Pulse: 72   Resp: 16   Temp: 97.8 °F (36.6 °C)   TempSrc: Oral   SpO2: 99%   Weight: 150 lb (68 kg)   Height: 5' 3\" (1.6 m)     Body mass index is 26.57 kg/(m^2). Physical Exam   Constitutional: No distress. Cardiovascular: Normal rate and regular rhythm. Pulmonary/Chest: Effort normal and breath sounds normal.       Assessment and Plan:   1. Osteopenia of multiple sites  Reviewed risks, benefits and alteratives of bisphonates medication. Patient has agreed to start. Consider repeat DEXA in 2 years. - alendronate (FOSAMAX) 35 mg tablet; Take 1 Tab by mouth every seven (7) days. Dispense: 13 Tab; Refill: 3    15 minutes spent face to face with patient with >50% of the time spent in counseling for osteopenia, bisphosphates, etc.    I have discussed the diagnosis with the patient and the intended plan as seen in the above orders. she has expressed understanding. The patient has received an after-visit summary and questions were answered concerning future plans. I have discussed medication side effects and warnings with the patient as well. Follow-up Disposition:  Return in about 6 months (around 12/27/2017). Electronically Signed: Hayde Redding MD     History   Patients past medical, surgical and family histories were reviewed and updated.     Past Medical History:   Diagnosis Date    Diabetes ([facility] [address]     Gallstone     Hypertension     Invasive carcinoma of breast ([facility] [address] 1/31/2017     Past Surgical History:   Procedure Laterality Date    HX GYN  1984    hysterectomy - Fibroids     Family History   Problem Relation Age of Onset    Bleeding Prob Mother      Anyerism- Brain    Diabetes Father     Other Father      hep C    Stroke Sister     No Known Problems Brother     No Known Problems Brother     No Known Problems Brother     No Known Problems Brother     No Known Problems Brother     Lupus Sister     No Known Problems Sister     No Known Problems Sister     No Known Problems Sister    [facility] Daughter     No Known Problems Daughter      Social History     Social History    Marital status:      Spouse name: N/A    Number of children: N/A    Years of education: N/A     Occupational History    Not on file. Social History Main Topics    Smoking status: Never Smoker    Smokeless tobacco: Never Used    Alcohol use No    Drug use: No    Sexual activity: Not Currently     Birth control/ protection: Surgical     Other Topics Concern    Not on file     Social History Narrative            Current Medications/Allergies     Current Outpatient Prescriptions   Medication Sig Dispense Refill    alendronate (FOSAMAX) 35 mg tablet Take 1 Tab by mouth every seven (7) days. 13 Tab 3    metFORMIN (GLUCOPHAGE) 500 mg tablet TAKE 1 TAB BY MOUTH TWO (2) TIMES DAILY (WITH MEALS). 60 Tab 1    loratadine (CLARITIN) 10 mg tablet Take 1 Tab by mouth daily. 30 Tab 1    olmesartan-hydroCHLOROthiazide (BENICAR HCT) 40-12.5 mg per tablet Take 1 Tab by mouth daily. 90 Tab 3    methylPREDNISolone (MEDROL DOSEPACK) 4 mg tablet Take it as directed.  1 Dose Pack 0     Allergies   Allergen Reactions    Penicillins Rash and Swelling

## 2017-06-27 NOTE — PROGRESS NOTES
Chief Complaint   Patient presents with    Follow-up    Hip Pain     sporadic--5/10     1. Have you been to the ER, urgent care clinic since your last visit? Hospitalized since your last visit? No    2. Have you seen or consulted any other health care providers outside of the [address] [place] [address] since your last visit? Include any pap smears or colon screening.  No      Eye exam--will make appt    Needs zoster vaccine

## 2017-06-27 NOTE — MR AVS SNAPSHOT
Visit Information Date & Time Provider Department Dept. Phone Encounter #  
 6/27/2017 10:20 AM Sera Santiago, [address] [facility] [phone] [id number] Follow-up Instructions Return in about 6 months (around 12/27/2017). Upcoming Health Maintenance Date Due  
 EYE EXAM RETINAL OR DILATED Q1 8/15/1960 DTaP/Tdap/Td series (1 - Tdap) 8/15/1971 FOBT Q 1 YEAR AGE 50-75 8/15/2000 ZOSTER VACCINE AGE 60> 8/15/2010 GLAUCOMA SCREENING Q2Y 8/15/2015 INFLUENZA AGE 9 TO ADULT 8/1/2017 MICROALBUMIN Q1 11/7/2017 LIPID PANEL Q1 11/7/2017 HEMOGLOBIN A1C Q6M 11/17/2017 Pneumococcal 65+ Low/Medium Risk (2 of 2 - PPSV23) 11/18/2017 FOOT EXAM Q1 5/17/2018 MEDICARE YEARLY EXAM 5/18/2018 BREAST CANCER SCRN MAMMOGRAM 1/9/2019 Allergies as of 6/27/2017  Review Complete On: 6/27/2017 By: Xavier Ramirez LPN Severity Noted Reaction Type Reactions Penicillins  11/07/2016   Systemic Rash, Swelling Current Immunizations  Never Reviewed Name Date Influenza High Dose Vaccine PF 11/7/2016 Not reviewed this visit You Were Diagnosed With   
  
 Codes Comments Osteopenia of multiple sites    -  Primary ICD-10-CM: M85.89 ICD-9-CM: 733.90 Vitals BP Pulse Temp Resp Height(growth percentile) Weight(growth percentile) 123/77 72 97.8 °F (36.6 °C) (Oral) 16 5' 3\" (1.6 m) 150 lb (68 kg) SpO2 BMI OB Status Smoking Status 99% 26.57 kg/m2 Hysterectomy Never Smoker Vitals History BMI and BSA Data Body Mass Index Body Surface Area  
 26.57 kg/m 2 1.74 m 2 Preferred Pharmacy Pharmacy Name Phone [id number] [place], [address] [facility] [phone] Your Updated Medication List  
  
   
This list is accurate as of: 6/27/17 11:34 AM.  Always use your most recent med list.  
  
  
  
  
 alendronate 35 mg tablet Commonly known as:  FOSAMAX Take 1 Tab by mouth every seven (7) days. loratadine 10 mg tablet Commonly known as:  [place] Take 1 Tab by mouth daily. metFORMIN 500 mg tablet Commonly known as:  GLUCOPHAGE  
TAKE 1 TAB BY MOUTH TWO (2) TIMES DAILY (WITH MEALS). methylPREDNISolone 4 mg tablet Commonly known as:  [place] [employer] Take it as directed. olmesartan-hydroCHLOROthiazide 40-12.5 mg per tablet Commonly known as:  BENICAR HCT Take 1 Tab by mouth daily. Prescriptions Sent to Pharmacy Refills  
 alendronate (FOSAMAX) 35 mg tablet 3 Sig: Take 1 Tab by mouth every seven (7) days. Class: Normal  
 Pharmacy: [id number], [address] [place] [address] #: [phone] Route: Oral  
  
Follow-up Instructions Return in about 6 months (around 12/27/2017). Patient Instructions Learning About Osteopenia What is osteopenia? Osteopenia is a decrease in thickness, or density, in bones. That means the bones become thinner and weaker. It is much more common in women than in men. It is an early form of osteoporosis, a condition in which the bones are so thin and weak that they can break easily. Having osteopenia means that there is a greater risk that you may get osteoporosis. It also means that you are more likely to break a bone than someone who does not have osteopenia. Osteopenia doesn't cause any symptoms. It's usually found with a type of X-ray called a bone density test. 
What increases the risk for osteopenia? Things that increase your risk include: 
· Having a family history of osteoporosis. · Being thin. · Being white or . · Getting too little physical activity. · Smoking. · Drinking too much alcohol often. · Using certain medicines such as steroids. How can you prevent osteoporosis?  
There are things you can do to slow down osteopenia and prevent osteoporosis. Certain lifestyle changes will help slow the loss of bone density. · Eat food that has plenty of calcium and vitamin D. Yogurt, cheese, milk, and dark green vegetables are high in calcium. Eggs, fatty fish, cereal, and fortified milk are high in vitamin D. 
· Talk to your doctor about taking a calcium supplement that has vitamin D in it. · Get regular exercise. ¨ Do 30 minutes of weight-bearing exercise on most days of the week. Walking, jogging, stair climbing, and dancing are good choices. ¨ Do resistance exercises with weights or elastic bands 2 or 3 days a week. · Limit alcohol to 2 drinks a day for men and 1 drink a day for women. Too much alcohol can cause health problems. · Do not smoke. Smoking can make bones thin faster. If you need help quitting, talk to your doctor about stop-smoking programs and medicines. These can increase your chances of quitting for good. Prescription medicines are available for treating bone thinning. But these are more often used to treat osteoporosis. Follow-up care is a key part of your treatment and safety. Be sure to make and go to all appointments, and call your doctor if you are having problems. It's also a good idea to know your test results and keep a list of the medicines you take. Where can you learn more? Go to http://charles-kristin.info/. Enter O098 in the search box to learn more about \"Learning About Osteopenia. \" Current as of: November 1, 2016 Content Version: 11.3 © 3537-6837 [employer]. Care instructions adapted under license by [employer] (which disclaims liability or warranty for this information). If you have questions about a medical condition or this instruction, always ask your healthcare professional. [place] [address] any warranty or liability for your use of this information. Alendronate (By mouth) Alendronate (a-HYE-nxwu-ioana) Treats or prevents osteoporosis. Also treats Paget disease of the bone. Brand Name(s): Binosto, Fosamax There may be other brand names for this medicine. When This Medicine Should Not Be Used: This medicine is not right for everyone. Do not use it if you had an allergic reaction to alendronate, or if you have esophagus problems or trouble swallowing. Do not use it if you cannot stand or sit upright for at least 30 minutes after you take the medicine. How to Use This Medicine:  
Liquid, Tablet, Fizzy Tablet · Take this medicine in the morning on an empty stomach. Follow the directions exactly to lower the risk of esophagus problems. · Sit or stand while you take this medicine. Do not lie down for at least 30 minutes after you take the medicine, and do not lie down until after you have eaten. · Use plain water to take your medicine. The medicine may not work as well if you use other liquids. ¨ Tablet: Swallow whole with 6 to 8 ounces of water. Do not chew or suck on the tablet. ¨ Oral liquid: Measure the oral liquid medicine with a marked measuring spoon, oral syringe, or medicine cup. Drink at least 2 ounces (1/4 cup) of water after you take the liquid medicine. ¨ Effervescent tablet: Dissolve the tablet in 4 ounces of room temperature water. Wait at least 5 minutes after the bubbling stops. Then stir the solution for 10 seconds and drink it. · Wait at least 30 minutes after you take this medicine before you eat or drink or take any other medicine. This will help your body absorb the medicine. · This medicine should come with a Medication Guide. Ask your pharmacist for a copy if you do not have one. · Missed dose: Take a dose the next morning. Do not take 2 tablets on the same day. Then go back to your regular schedule. · Store the medicine in a closed container at room temperature, away from heat, moisture, and direct light.  Keep the effervescent tablets in the blister pack until you are ready to use them. Drugs and Foods to Avoid: Ask your doctor or pharmacist before using any other medicine, including over-the-counter medicines, vitamins, and herbal products. · Some medicines can affect how alendronate works. Tell your doctor if you are using any of the following:  
¨ Cancer medicines ¨ NSAID pain or arthritis medicine (including aspirin, celecoxib, diclofenac, ibuprofen, naproxen) ¨ Steroid medicine (including as hydrocortisone, methylprednisolone, prednisone, prednisolone, dexamethasone) · Take alendronate at least 30 minutes before you take any other oral medicine, including aluminum, magnesium, iron, or calcium supplements, or antacids. Warnings While Using This Medicine: · Tell your doctor if you are pregnant or breastfeeding, or if you have kidney disease, heartburn, anemia, blood clotting problems, ulcers or other stomach or bowel problems, a vitamin D deficiency, or a history of cancer. Tell your doctor if you have dental problems or if you wear dentures. Also tell your doctor if you smoke or drink alcohol. · This medicine may cause the following problems: ¨ Damage to your esophagus ¨ Increased risk for a thigh bone fracture ¨ Low calcium levels · Tell any doctor or dentist who treats you that you are using this medicine. This medicine may cause jaw problems, especially if you have a tooth pulled or other dental work. · The effervescent tablet contains sodium. Tell your doctor if you are on a low-salt diet. · Your doctor will check your progress and the effects of this medicine at regular visits. Keep all appointments. · Keep all medicine out of the reach of children. Never share your medicine with anyone. Possible Side Effects While Using This Medicine:  
Call your doctor right away if you notice any of these side effects: · Allergic reaction: Itching or hives, swelling in your face or hands, swelling or tingling in your mouth or throat, chest tightness, trouble breathing · Blistering, peeling, red skin rash · Chest pain, new or worsening heartburn, or a burning feeling in your throat · Muscle spasms or twitching, tingling or numbness in your fingers, toes, or around your mouth · Pain or difficulty when swallowing · Pain, swelling, numbness, or a heavy feeling in your mouth or jaw, loose teeth, or other tooth problems · Severe bone, joint, or muscle pain · Unusual pain in your thigh, groin, or hip If you notice these less serious side effects, talk with your doctor: · Mild stomach pain or upset If you notice other side effects that you think are caused by this medicine, tell your doctor. Call your doctor for medical advice about side effects. You may report side effects to FDA at 9-031-FDA-2831 © 2017 [address] Information is for End User's use only and may not be sold, redistributed or otherwise used for commercial purposes. The above information is an  only. It is not intended as medical advice for individual conditions or treatments. Talk to your doctor, nurse or pharmacist before following any medical regimen to see if it is safe and effective for you. Please provide this summary of care documentation to your next provider. Your primary care clinician is listed as Valeria Sepulveda. If you have any questions after today's visit, please call [phone].

## 2017-06-27 NOTE — PATIENT INSTRUCTIONS
Learning About Osteopenia  What is osteopenia? Osteopenia is a decrease in thickness, or density, in bones. That means the bones become thinner and weaker. It is much more common in women than in men. It is an early form of osteoporosis, a condition in which the bones are so thin and weak that they can break easily. Having osteopenia means that there is a greater risk that you may get osteoporosis. It also means that you are more likely to break a bone than someone who does not have osteopenia. Osteopenia doesn't cause any symptoms. It's usually found with a type of X-ray called a bone density test.  What increases the risk for osteopenia? Things that increase your risk include:  · Having a family history of osteoporosis. · Being thin. · Being white or . · Getting too little physical activity. · Smoking. · Drinking too much alcohol often. · Using certain medicines such as steroids. How can you prevent osteoporosis? There are things you can do to slow down osteopenia and prevent osteoporosis. Certain lifestyle changes will help slow the loss of bone density. · Eat food that has plenty of calcium and vitamin D. Yogurt, cheese, milk, and dark green vegetables are high in calcium. Eggs, fatty fish, cereal, and fortified milk are high in vitamin D.  · Talk to your doctor about taking a calcium supplement that has vitamin D in it. · Get regular exercise. ¨ Do 30 minutes of weight-bearing exercise on most days of the week. Walking, jogging, stair climbing, and dancing are good choices. ¨ Do resistance exercises with weights or elastic bands 2 or 3 days a week. · Limit alcohol to 2 drinks a day for men and 1 drink a day for women. Too much alcohol can cause health problems. · Do not smoke. Smoking can make bones thin faster. If you need help quitting, talk to your doctor about stop-smoking programs and medicines. These can increase your chances of quitting for good.   Prescription medicines are available for treating bone thinning. But these are more often used to treat osteoporosis. Follow-up care is a key part of your treatment and safety. Be sure to make and go to all appointments, and call your doctor if you are having problems. It's also a good idea to know your test results and keep a list of the medicines you take. Where can you learn more? Go to http://charles-kristin.info/. Enter I724 in the search box to learn more about \"Learning About Osteopenia. \"  Current as of: November 1, 2016  Content Version: 11.3  © 9502-2447 [employer]. Care instructions adapted under license by [employer] (which disclaims liability or warranty for this information). If you have questions about a medical condition or this instruction, always ask your healthcare professional. [address] any warranty or liability for your use of this information. Alendronate (By mouth)   Alendronate (r-[id number]-ioana)  Treats or prevents osteoporosis. Also treats Paget disease of the bone. Brand Name(s): Binosto, Fosamax   There may be other brand names for this medicine. When This Medicine Should Not Be Used: This medicine is not right for everyone. Do not use it if you had an allergic reaction to alendronate, or if you have esophagus problems or trouble swallowing. Do not use it if you cannot stand or sit upright for at least 30 minutes after you take the medicine. How to Use This Medicine:   Liquid, Tablet, Fizzy Tablet  · Take this medicine in the morning on an empty stomach. Follow the directions exactly to lower the risk of esophagus problems. · Sit or stand while you take this medicine. Do not lie down for at least 30 minutes after you take the medicine, and do not lie down until after you have eaten. · Use plain water to take your medicine. The medicine may not work as well if you use other liquids. ¨ Tablet: Swallow whole with 6 to 8 ounces of water.  Do not chew or suck on the tablet. ¨ Oral liquid: Measure the oral liquid medicine with a marked measuring spoon, oral syringe, or medicine cup. Drink at least 2 ounces (1/4 cup) of water after you take the liquid medicine. ¨ Effervescent tablet: Dissolve the tablet in 4 ounces of room temperature water. Wait at least 5 minutes after the bubbling stops. Then stir the solution for 10 seconds and drink it. · Wait at least 30 minutes after you take this medicine before you eat or drink or take any other medicine. This will help your body absorb the medicine. · This medicine should come with a Medication Guide. Ask your pharmacist for a copy if you do not have one. · Missed dose: Take a dose the next morning. Do not take 2 tablets on the same day. Then go back to your regular schedule. · Store the medicine in a closed container at room temperature, away from heat, moisture, and direct light. Keep the effervescent tablets in the blister pack until you are ready to use them. Drugs and Foods to Avoid:   Ask your doctor or pharmacist before using any other medicine, including over-the-counter medicines, vitamins, and herbal products. · Some medicines can affect how alendronate works. Tell your doctor if you are using any of the following:   ¨ Cancer medicines  ¨ NSAID pain or arthritis medicine (including aspirin, celecoxib, diclofenac, ibuprofen, naproxen)  ¨ Steroid medicine (including as hydrocortisone, methylprednisolone, prednisone, prednisolone, dexamethasone)  · Take alendronate at least 30 minutes before you take any other oral medicine, including aluminum, magnesium, iron, or calcium supplements, or antacids. Warnings While Using This Medicine:   · Tell your doctor if you are pregnant or breastfeeding, or if you have kidney disease, heartburn, anemia, blood clotting problems, ulcers or other stomach or bowel problems, a vitamin D deficiency, or a history of cancer.  Tell your doctor if you have dental problems or if you wear dentures. Also tell your doctor if you smoke or drink alcohol. · This medicine may cause the following problems:   ¨ Damage to your esophagus  ¨ Increased risk for a thigh bone fracture  ¨ Low calcium levels  · Tell any doctor or dentist who treats you that you are using this medicine. This medicine may cause jaw problems, especially if you have a tooth pulled or other dental work. · The effervescent tablet contains sodium. Tell your doctor if you are on a low-salt diet. · Your doctor will check your progress and the effects of this medicine at regular visits. Keep all appointments. · Keep all medicine out of the reach of children. Never share your medicine with anyone. Possible Side Effects While Using This Medicine:   Call your doctor right away if you notice any of these side effects:  · Allergic reaction: Itching or hives, swelling in your face or hands, swelling or tingling in your mouth or throat, chest tightness, trouble breathing  · Blistering, peeling, red skin rash  · Chest pain, new or worsening heartburn, or a burning feeling in your throat  · Muscle spasms or twitching, tingling or numbness in your fingers, toes, or around your mouth  · Pain or difficulty when swallowing  · Pain, swelling, numbness, or a heavy feeling in your mouth or jaw, loose teeth, or other tooth problems  · Severe bone, joint, or muscle pain  · Unusual pain in your thigh, groin, or hip  If you notice these less serious side effects, talk with your doctor:   · Mild stomach pain or upset  If you notice other side effects that you think are caused by this medicine, tell your doctor. Call your doctor for medical advice about side effects. You may report side effects to FDA at 0-968-FDA-6509  © 2017 [address] [employer] Information is for End User's use only and may not be sold, redistributed or otherwise used for commercial purposes. The above information is an  only.  It is not intended as medical advice for individual conditions or treatments. Talk to your doctor, nurse or pharmacist before following any medical regimen to see if it is safe and effective for you.

## 2017-07-10 DIAGNOSIS — R21 RASH: ICD-10-CM

## 2017-07-10 NOTE — TELEPHONE ENCOUNTER
Patient needs a refill of the following  Requested Prescriptions     Pending Prescriptions Disp Refills    methylPREDNISolone (MEDROL DOSEPACK) 4 mg tablet 1 Dose Pack 0     Sig: Take it as directed.

## 2017-07-11 RX ORDER — METHYLPREDNISOLONE 4 MG/1
TABLET ORAL
Qty: 1 DOSE PACK | Refills: 0 | OUTPATIENT
Start: 2017-07-11

## 2017-07-11 NOTE — TELEPHONE ENCOUNTER
Called patient to inform her that an appointment is needed however she does not have VM set up. Unable to leave VM at the time.  Please inform patient if she calls back

## 2017-07-13 ENCOUNTER — OFFICE VISIT (OUTPATIENT)
Dept: FAMILY MEDICINE CLINIC | Age: 67
End: 2017-07-13

## 2017-07-13 VITALS
WEIGHT: 149 LBS | RESPIRATION RATE: 18 BRPM | BODY MASS INDEX: 26.4 KG/M2 | TEMPERATURE: 97 F | OXYGEN SATURATION: 100 % | HEIGHT: 63 IN | DIASTOLIC BLOOD PRESSURE: 81 MMHG | SYSTOLIC BLOOD PRESSURE: 124 MMHG | HEART RATE: 88 BPM

## 2017-07-13 DIAGNOSIS — C50.919 INVASIVE CARCINOMA OF BREAST (HCC): ICD-10-CM

## 2017-07-13 DIAGNOSIS — R21 RASH AND NONSPECIFIC SKIN ERUPTION: Primary | ICD-10-CM

## 2017-07-13 RX ORDER — METHYLPREDNISOLONE 4 MG/1
TABLET ORAL
Qty: 1 DOSE PACK | Refills: 0 | Status: SHIPPED | OUTPATIENT
Start: 2017-07-13 | End: 2017-10-02 | Stop reason: ALTCHOICE

## 2017-07-13 NOTE — PROGRESS NOTES
Subjective  Tarah Benson is an 77 y.o. female who presents for skin rash. Was seen on 5/30 for the same and given medrol dose pack with some relief. Had been taking loratadine for symptomatic treatment but notes today that she occasionally needs to double dose. No changes in soap, detergents. No pets. Non-smoker. Applies vaseline over body. Rash over entire body. No mucosal involvement. Started in February. Allergies - reviewed: Allergies   Allergen Reactions    Penicillins Rash and Swelling         Medications - reviewed:   Current Outpatient Prescriptions   Medication Sig    methylPREDNISolone (MEDROL DOSEPACK) 4 mg tablet Use as directed on box.  alendronate (FOSAMAX) 35 mg tablet Take 1 Tab by mouth every seven (7) days.  metFORMIN (GLUCOPHAGE) 500 mg tablet TAKE 1 TAB BY MOUTH TWO (2) TIMES DAILY (WITH MEALS).  loratadine (CLARITIN) 10 mg tablet Take 1 Tab by mouth daily.  olmesartan-hydroCHLOROthiazide (BENICAR HCT) 40-12.5 mg per tablet Take 1 Tab by mouth daily. No current facility-administered medications for this visit. Past Medical History - reviewed:  Past Medical History:   Diagnosis Date    Diabetes ([facility]     Gallstone     Hypertension     Invasive carcinoma of breast ([facility] 1/31/2017         Past Surgical History - reviewed:   Past Surgical History:   Procedure Laterality Date    HX GYN  1984    hysterectomy - Fibroids         Social History - reviewed:  Social History     Social History    Marital status:      Spouse name: N/A    Number of children: N/A    Years of education: N/A     Occupational History    Not on file.      Social History Main Topics    Smoking status: Never Smoker    Smokeless tobacco: Never Used    Alcohol use No    Drug use: No    Sexual activity: Not Currently     Birth control/ protection: Surgical     Other Topics Concern    Not on file     Social History Narrative         Family History - reviewed:  Family History Problem Relation Age of Onset    Bleeding Prob Mother      Anyerism- Brain    Diabetes Father     Other Father      hep C    Stroke Sister     No Known Problems Brother     No Known Problems Brother     No Known Problems Brother     No Known Problems Brother     No Known Problems Brother     Lupus Sister     No Known Problems Sister     No Known Problems Sister     No Known Problems Sister    [facility] Migraines Daughter     No Known Problems Daughter          Immunizations - reviewed:   Immunization History   Administered Date(s) Administered    Influenza High Dose Vaccine PF 11/07/2016         ROS  CONSTITUTIONAL: Denies: fever  ENT: Denies: difficulty swallowing  CARDIOVASCULAR: Denies: chest pain  RESPIRATORY: Denies: shortness of breath  SKIN: rash, itching, hives      Physical Exam  Visit Vitals    /81    Pulse 88    Temp 97 °F (36.1 °C) (Oral)    Resp 18    Ht 5' 3\" (1.6 m)    Wt 149 lb (67.6 kg)    SpO2 100%    BMI 26.39 kg/m2       General appearance - alert, well appearing, and in no distress  Eyes - EOMI  Mouth - mucous membranes moist, pharynx normal without lesions  Neck - supple, no significant adenopathy  Chest - clear to auscultation, no wheezes, rales or rhonchi, symmetric air entry  Heart - normal rate, regular rhythm, normal S1, S2, no murmurs, rubs, clicks or gallops  Neurological - alert, oriented, normal speech, no focal findings or movement disorder noted  Skin - LESIONS NOTED: 1 cm subcutaneous palpable round wheals over BUE. No erythema. Assessment/Plan    ICD-10-CM ICD-9-CM    1. Rash and nonspecific skin eruption R21 782.1 CBC W/O DIFF      METABOLIC PANEL, COMPREHENSIVE      methylPREDNISolone (MEDROL DOSEPACK) 4 mg tablet      REFERRAL TO ALLERGY   2. Invasive carcinoma of breast (HCC) C50.919 174.9 CBC W/O DIFF       Persistent rash for several months that appear to respond to 2nd generation antihistamines but reappear. No obvious trigger.  No mucocutaneous involvement. Will give another medrol dose pack but also draw routine labs, refer to allergist.     Patient does have underlying malignancy that is being untreated. Told patient that it is possible that she may need treatment of malignancy in order to achieve complete skin clearance if rash is secondary to breast cancer. Follow-up Disposition:  Return if symptoms worsen or fail to improve. I have discussed the diagnosis with the patient and the intended plan as seen in the above orders. The patient has received an after-visit summary and questions were answered concerning future plans. I have discussed medication side effects and warnings with the patient as well. Lucila Chau MD  Family Medicine Resident    Patient discussed with Dr. Jerome Romero, attending physician.

## 2017-07-13 NOTE — MR AVS SNAPSHOT
Visit Information Date & Time Provider Department Dept. Phone Encounter #  
 7/13/2017 10:45 AM Amos Bernal MD [address] [place] [address] [phone] [id number] Follow-up Instructions Return if symptoms worsen or fail to improve. Upcoming Health Maintenance Date Due  
 EYE EXAM RETINAL OR DILATED Q1 8/15/1960 DTaP/Tdap/Td series (1 - Tdap) 8/15/1971 FOBT Q 1 YEAR AGE 50-75 8/15/2000 ZOSTER VACCINE AGE 60> 8/15/2010 GLAUCOMA SCREENING Q2Y 8/15/2015 INFLUENZA AGE 9 TO ADULT 8/1/2017 MICROALBUMIN Q1 11/7/2017 LIPID PANEL Q1 11/7/2017 HEMOGLOBIN A1C Q6M 11/17/2017 Pneumococcal 65+ Low/Medium Risk (2 of 2 - PPSV23) 11/18/2017 FOOT EXAM Q1 5/17/2018 MEDICARE YEARLY EXAM 5/18/2018 BREAST CANCER SCRN MAMMOGRAM 1/9/2019 Allergies as of 7/13/2017  Review Complete On: 7/13/2017 By: Amos Bernal MD  
  
 Severity Noted Reaction Type Reactions Penicillins  11/07/2016   Systemic Rash, Swelling Current Immunizations  Never Reviewed Name Date Influenza High Dose Vaccine PF 11/7/2016 Not reviewed this visit You Were Diagnosed With   
  
 Codes Comments Rash and nonspecific skin eruption    -  Primary ICD-10-CM: R21 
ICD-9-CM: 782.1 [facility]     ICD-10-CM: C50.919 ICD-9-CM: 174.9 Vitals BP Pulse Temp Resp Height(growth percentile) Weight(growth percentile) 124/81 88 97 °F (36.1 °C) (Oral) 18 5' 3\" (1.6 m) 149 lb (67.6 kg) SpO2 BMI OB Status Smoking Status 100% 26.39 kg/m2 Hysterectomy Never Smoker Vitals History BMI and BSA Data Body Mass Index Body Surface Area  
 26.39 kg/m 2 1.73 m 2 Preferred Pharmacy Pharmacy Name Phone [phone], [address] [place] [address] [phone] Your Updated Medication List  
  
   
This list is accurate as of: 7/13/17 11:20 AM.  Always use your most recent med list.  
  
  
  
  
 alendronate 35 mg tablet Commonly known as:  FOSAMAX Take 1 Tab by mouth every seven (7) days. loratadine 10 mg tablet Commonly known as:  Gennett Caroli Take 1 Tab by mouth daily. metFORMIN 500 mg tablet Commonly known as:  GLUCOPHAGE  
TAKE 1 TAB BY MOUTH TWO (2) TIMES DAILY (WITH MEALS). methylPREDNISolone 4 mg tablet Commonly known as:  Jodeane Hove Use as directed on box. olmesartan-hydroCHLOROthiazide 40-12.5 mg per tablet Commonly known as:  BENICAR HCT Take 1 Tab by mouth daily. Prescriptions Sent to Pharmacy Refills  
 methylPREDNISolone (MEDROL DOSEPACK) 4 mg tablet 0 Sig: Use as directed on box. Class: Normal  
 Pharmacy: [id number], [address] [place] [address] #: [phone] We Performed the Following CBC W/O DIFF [86897 CPT(R)] METABOLIC PANEL, COMPREHENSIVE [79287 CPT(R)] REFERRAL TO ALLERGY [REF5 Custom] Comments:  
 Please evaluate patient for recurrent wheals for 5 month duration. Follow-up Instructions Return if symptoms worsen or fail to improve. Referral Information Referral ID Referred By Referred To  
  
 [id number] [facility] [phone] Chely Malhotra Visits Status Start Date End Date 1 New Request 7/13/17 7/13/18 If your referral has a status of pending review or denied, additional information will be sent to support the outcome of this decision. Patient Instructions Allegra over the counter. Please provide this summary of care documentation to your next provider. Your primary care clinician is listed as Juliette Llanos. If you have any questions after today's visit, please call [phone].

## 2017-07-14 ENCOUNTER — TELEPHONE (OUTPATIENT)
Dept: FAMILY MEDICINE CLINIC | Age: 67
End: 2017-07-14

## 2017-07-14 LAB
ALBUMIN SERPL-MCNC: 4.2 G/DL (ref 3.6–4.8)
ALBUMIN/GLOB SERPL: 1.8 {RATIO} (ref 1.2–2.2)
ALP SERPL-CCNC: 62 IU/L (ref 39–117)
ALT SERPL-CCNC: 9 IU/L (ref 0–32)
AST SERPL-CCNC: 12 IU/L (ref 0–40)
BILIRUB SERPL-MCNC: 1.1 MG/DL (ref 0–1.2)
BUN SERPL-MCNC: 12 MG/DL (ref 8–27)
BUN/CREAT SERPL: 16 (ref 12–28)
CALCIUM SERPL-MCNC: 9.9 MG/DL (ref 8.7–10.3)
CHLORIDE SERPL-SCNC: 102 MMOL/L (ref 96–106)
CO2 SERPL-SCNC: 23 MMOL/L (ref 18–29)
CREAT SERPL-MCNC: 0.74 MG/DL (ref 0.57–1)
ERYTHROCYTE [DISTWIDTH] IN BLOOD BY AUTOMATED COUNT: 14.4 % (ref 12.3–15.4)
GLOBULIN SER CALC-MCNC: 2.4 G/DL (ref 1.5–4.5)
GLUCOSE SERPL-MCNC: 103 MG/DL (ref 65–99)
HCT VFR BLD AUTO: 40.1 % (ref 34–46.6)
HGB BLD-MCNC: 13.2 G/DL (ref 11.1–15.9)
MCH RBC QN AUTO: 28.9 PG (ref 26.6–33)
MCHC RBC AUTO-ENTMCNC: 32.9 G/DL (ref 31.5–35.7)
MCV RBC AUTO: 88 FL (ref 79–97)
PLATELET # BLD AUTO: 257 X10E3/UL (ref 150–379)
POTASSIUM SERPL-SCNC: 4.1 MMOL/L (ref 3.5–5.2)
PROT SERPL-MCNC: 6.6 G/DL (ref 6–8.5)
RBC # BLD AUTO: 4.57 X10E6/UL (ref 3.77–5.28)
SODIUM SERPL-SCNC: 143 MMOL/L (ref 134–144)
WBC # BLD AUTO: 6.6 X10E3/UL (ref 3.4–10.8)

## 2017-07-14 NOTE — TELEPHONE ENCOUNTER
Pharmacy called to say we could consider statin for pt with DMII    Pt should be asked about prior intolerance to statins at next DM f/u and initiate if can tolerate

## 2017-07-14 NOTE — TELEPHONE ENCOUNTER
Juan Pool from [address] [facility] calling to speak with Dr. Ozzy Hdez regarding a medication she would like to suggest the patient be taking because of the other medications he is on.  Please call [phone]

## 2017-08-07 DIAGNOSIS — R21 RASH: ICD-10-CM

## 2017-08-07 NOTE — TELEPHONE ENCOUNTER
Patient needs a refill of the following  Requested Prescriptions     Pending Prescriptions Disp Refills    loratadine (CLARITIN) 10 mg tablet 30 Tab 1     Sig: Take 1 Tab by mouth daily.

## 2017-08-08 RX ORDER — LORATADINE 10 MG/1
10 TABLET ORAL DAILY
Qty: 30 TAB | Refills: 1 | Status: SHIPPED | OUTPATIENT
Start: 2017-08-08

## 2017-08-28 ENCOUNTER — OFFICE VISIT (OUTPATIENT)
Dept: FAMILY MEDICINE CLINIC | Age: 67
End: 2017-08-28

## 2017-08-28 VITALS
HEART RATE: 85 BPM | BODY MASS INDEX: 27.11 KG/M2 | DIASTOLIC BLOOD PRESSURE: 83 MMHG | SYSTOLIC BLOOD PRESSURE: 139 MMHG | TEMPERATURE: 98.3 F | OXYGEN SATURATION: 98 % | HEIGHT: 63 IN | RESPIRATION RATE: 16 BRPM | WEIGHT: 153 LBS

## 2017-08-28 DIAGNOSIS — R09.82 PND (POST-NASAL DRIP): ICD-10-CM

## 2017-08-28 DIAGNOSIS — J02.9 SORE THROAT: Primary | ICD-10-CM

## 2017-08-28 LAB
S PYO AG THROAT QL: NEGATIVE
VALID INTERNAL CONTROL?: YES

## 2017-08-28 RX ORDER — FLUTICASONE PROPIONATE 50 MCG
2 SPRAY, SUSPENSION (ML) NASAL DAILY
Qty: 1 BOTTLE | Refills: 0 | Status: SHIPPED | OUTPATIENT
Start: 2017-08-28 | End: 2019-07-27

## 2017-08-28 NOTE — PROGRESS NOTES
Chief Complaint   Patient presents with    Sore Throat     patient states diffculty to swallow at times. . patient rates sore throat pain as a 9 on pain scale. ..    Leg Pain     left leg pain. .. patient states left leg gets stiff at times     1. Have you been to the ER, urgent care clinic since your last visit? Hospitalized since your last visit? No    2. Have you seen or consulted any other health care providers outside of the [address] [place] [address] since your last visit? Include any pap smears or colon screening.  No

## 2017-08-28 NOTE — PATIENT INSTRUCTIONS
Sore Throat: Care Instructions  Your Care Instructions    Infection by bacteria or a virus causes most sore throats. Cigarette smoke, dry air, air pollution, allergies, and yelling can also cause a sore throat. Sore throats can be painful and annoying. Fortunately, most sore throats go away on their own. If you have a bacterial infection, your doctor may prescribe antibiotics. Follow-up care is a key part of your treatment and safety. Be sure to make and go to all appointments, and call your doctor if you are having problems. It's also a good idea to know your test results and keep a list of the medicines you take. How can you care for yourself at home? · If your doctor prescribed antibiotics, take them as directed. Do not stop taking them just because you feel better. You need to take the full course of antibiotics. · Gargle with warm salt water once an hour to help reduce swelling and relieve discomfort. Use 1 teaspoon of salt mixed in 1 cup of warm water. · Take an over-the-counter pain medicine, such as acetaminophen (Tylenol), ibuprofen (Advil, Motrin), or naproxen (Aleve). Read and follow all instructions on the label. · Be careful when taking over-the-counter cold or flu medicines and Tylenol at the same time. Many of these medicines have acetaminophen, which is Tylenol. Read the labels to make sure that you are not taking more than the recommended dose. Too much acetaminophen (Tylenol) can be harmful. · Drink plenty of fluids. Fluids may help soothe an irritated throat. Hot fluids, such as tea or soup, may help decrease throat pain. · Use over-the-counter throat lozenges to soothe pain. Regular cough drops or hard candy may also help. These should not be given to young children because of the risk of choking. · Do not smoke or allow others to smoke around you. If you need help quitting, talk to your doctor about stop-smoking programs and medicines.  These can increase your chances of quitting for good. · Use a vaporizer or humidifier to add moisture to your bedroom. Follow the directions for cleaning the machine. When should you call for help? Call your doctor now or seek immediate medical care if:  · You have new or worse trouble swallowing. · Your sore throat gets much worse on one side. Watch closely for changes in your health, and be sure to contact your doctor if you do not get better as expected. Where can you learn more? Go to http://charles-kristin.info/. Enter [phone] in the search box to learn more about \"Sore Throat: Care Instructions. \"  Current as of: July 29, 2016  Content Version: 11.3  © 8508-7020 [employer]. Care instructions adapted under license by [employer] (which disclaims liability or warranty for this information). If you have questions about a medical condition or this instruction, always ask your healthcare professional. [address] any warranty or liability for your use of this information.

## 2017-08-28 NOTE — MR AVS SNAPSHOT
Visit Information Date & Time Provider Department Dept. Phone Encounter #  
 8/28/2017  4:20 PM Shamajanice Alexis, [address] [facility] [phone] [id number] Follow-up Instructions Return if symptoms worsen or fail to improve. Upcoming Health Maintenance Date Due  
 EYE EXAM RETINAL OR DILATED Q1 8/15/1960 DTaP/Tdap/Td series (1 - Tdap) 8/15/1971 FOBT Q 1 YEAR AGE 50-75 8/15/2000 ZOSTER VACCINE AGE 60> 6/15/2010 GLAUCOMA SCREENING Q2Y 8/15/2015 INFLUENZA AGE 9 TO ADULT 8/1/2017 MICROALBUMIN Q1 11/7/2017 LIPID PANEL Q1 11/7/2017 HEMOGLOBIN A1C Q6M 11/17/2017 Pneumococcal 65+ Low/Medium Risk (2 of 2 - PPSV23) 11/18/2017 FOOT EXAM Q1 5/17/2018 MEDICARE YEARLY EXAM 5/18/2018 BREAST CANCER SCRN MAMMOGRAM 1/9/2019 Allergies as of 8/28/2017  Review Complete On: 8/28/2017 By: Dayanara Espinoza MD  
  
 Severity Noted Reaction Type Reactions Penicillins  11/07/2016   Systemic Rash, Swelling Current Immunizations  Never Reviewed Name Date Influenza High Dose Vaccine PF 11/7/2016 Not reviewed this visit You Were Diagnosed With   
  
 Codes Comments Sore throat    -  Primary ICD-10-CM: J02.9 ICD-9-CM: 989 PND (post-nasal drip)     ICD-10-CM: R09.82 ICD-9-CM: 784.91 Vitals BP Pulse Temp Resp Height(growth percentile) Weight(growth percentile) 139/83 85 98.3 °F (36.8 °C) (Oral) 16 5' 3\" (1.6 m) 153 lb (69.4 kg) SpO2 BMI OB Status Smoking Status 98% 27.1 kg/m2 Hysterectomy Never Smoker Vitals History BMI and BSA Data Body Mass Index Body Surface Area  
 27.1 kg/m 2 1.76 m 2 Preferred Pharmacy Pharmacy Name Phone [id number] [place] [facility], [address] [facility] [phone] Your Updated Medication List  
  
   
This list is accurate as of: 8/28/17  4:47 PM.  Always use your most recent med list.  
  
  
  
  
 alendronate 35 mg tablet Commonly known as:  FOSAMAX Take 1 Tab by mouth every seven (7) days. fluticasone 50 mcg/actuation nasal spray Commonly known as:  Francella Lacks 2 Sprays by Both Nostrils route daily. loratadine 10 mg tablet Commonly known as:  Ivey Canavan Take 1 Tab by mouth daily. metFORMIN 500 mg tablet Commonly known as:  GLUCOPHAGE  
TAKE 1 TAB BY MOUTH TWO (2) TIMES DAILY (WITH MEALS). methylPREDNISolone 4 mg tablet Commonly known as:  Pollie Reynold Use as directed on box. olmesartan-hydroCHLOROthiazide 40-12.5 mg per tablet Commonly known as:  BENICAR HCT Take 1 Tab by mouth daily. Prescriptions Sent to Pharmacy Refills  
 fluticasone (FLONASE) 50 mcg/actuation nasal spray 0 Si Sprays by Both Nostrils route daily. Class: Normal  
 Pharmacy: [facility] #[id number] #: [phone] Route: Both Nostrils We Performed the Following AMB POC RAPID STREP A [32792 CPT(R)] Follow-up Instructions Return if symptoms worsen or fail to improve. Patient Instructions Sore Throat: Care Instructions Your Care Instructions Infection by bacteria or a virus causes most sore throats. Cigarette smoke, dry air, air pollution, allergies, and yelling can also cause a sore throat. Sore throats can be painful and annoying. Fortunately, most sore throats go away on their own. If you have a bacterial infection, your doctor may prescribe antibiotics. Follow-up care is a key part of your treatment and safety. Be sure to make and go to all appointments, and call your doctor if you are having problems. It's also a good idea to know your test results and keep a list of the medicines you take. How can you care for yourself at home? · If your doctor prescribed antibiotics, take them as directed.  Do not stop taking them just because you feel better. You need to take the full course of antibiotics. · Gargle with warm salt water once an hour to help reduce swelling and relieve discomfort. Use 1 teaspoon of salt mixed in 1 cup of warm water. · Take an over-the-counter pain medicine, such as acetaminophen (Tylenol), ibuprofen (Advil, Motrin), or naproxen (Aleve). Read and follow all instructions on the label. · Be careful when taking over-the-counter cold or flu medicines and Tylenol at the same time. Many of these medicines have acetaminophen, which is Tylenol. Read the labels to make sure that you are not taking more than the recommended dose. Too much acetaminophen (Tylenol) can be harmful. · Drink plenty of fluids. Fluids may help soothe an irritated throat. Hot fluids, such as tea or soup, may help decrease throat pain. · Use over-the-counter throat lozenges to soothe pain. Regular cough drops or hard candy may also help. These should not be given to young children because of the risk of choking. · Do not smoke or allow others to smoke around you. If you need help quitting, talk to your doctor about stop-smoking programs and medicines. These can increase your chances of quitting for good. · Use a vaporizer or humidifier to add moisture to your bedroom. Follow the directions for cleaning the machine. When should you call for help? Call your doctor now or seek immediate medical care if: 
· You have new or worse trouble swallowing. · Your sore throat gets much worse on one side. Watch closely for changes in your health, and be sure to contact your doctor if you do not get better as expected. Where can you learn more? Go to http://charles-kristin.info/. Enter [phone] in the search box to learn more about \"Sore Throat: Care Instructions. \" Current as of: July 29, 2016 Content Version: 11.3 © 8067-0927 [employer].  Care instructions adapted under license by [address] (which disclaims liability or warranty for this information). If you have questions about a medical condition or this instruction, always ask your healthcare professional. [address] any warranty or liability for your use of this information. Please provide this summary of care documentation to your next provider. Your primary care clinician is listed as Marsha Redman. If you have any questions after today's visit, please call [phone].

## 2017-08-28 NOTE — PROGRESS NOTES
Subjective  Luis M Chery is an 79 y.o. female who presents for sore throat. Reports sore throat for 3 days. No fever. Runny nose yesterday, cough this morning. Took claritin-D yesterday, cough drops without much relief. Allergies - reviewed: Allergies   Allergen Reactions    Penicillins Rash and Swelling         Medications - reviewed:   Current Outpatient Prescriptions   Medication Sig    fluticasone (FLONASE) 50 mcg/actuation nasal spray 2 Sprays by Both Nostrils route daily.  metFORMIN (GLUCOPHAGE) 500 mg tablet TAKE 1 TAB BY MOUTH TWO (2) TIMES DAILY (WITH MEALS).  olmesartan-hydroCHLOROthiazide (BENICAR HCT) 40-12.5 mg per tablet Take 1 Tab by mouth daily.  loratadine (CLARITIN) 10 mg tablet Take 1 Tab by mouth daily.  methylPREDNISolone (MEDROL DOSEPACK) 4 mg tablet Use as directed on box.  alendronate (FOSAMAX) 35 mg tablet Take 1 Tab by mouth every seven (7) days. No current facility-administered medications for this visit. Past Medical History - reviewed:  Past Medical History:   Diagnosis Date    [facility] [address]     Gallstone     Hypertension     Invasive carcinoma of breast ([facility] [address] 1/31/2017         Past Surgical History - reviewed:   Past Surgical History:   Procedure Laterality Date    HX GYN  1984    hysterectomy - Fibroids         Social History - reviewed:  Social History     Social History    Marital status:      Spouse name: N/A    Number of children: N/A    Years of education: N/A     Occupational History    Not on file.      Social History Main Topics    Smoking status: Never Smoker    Smokeless tobacco: Never Used    Alcohol use No    Drug use: No    Sexual activity: Not Currently     Birth control/ protection: Surgical     Other Topics Concern    Not on file     Social History Narrative         Family History - reviewed:  Family History   Problem Relation Age of Onset    Bleeding Prob Mother      Anyerism- Brain    Diabetes Father  Other Father      hep C    Stroke Sister     No Known Problems Brother     No Known Problems Brother     No Known Problems Brother     No Known Problems Brother     No Known Problems Brother     Lupus Sister     No Known Problems Sister     No Known Problems Sister     No Known Problems Sister    Maggi Asa Migraines Daughter     No Known Problems Daughter          Immunizations - reviewed:   Immunization History   Administered Date(s) Administered    Influenza High Dose Vaccine PF 11/07/2016         ROS  CONSTITUTIONAL: Denies: fever  ENT: sore throat, nasal congestion  CARDIOVASCULAR: Denies: chest pain  RESPIRATORY: cough      Physical Exam  Visit Vitals    /83    Pulse 85    Temp 98.3 °F (36.8 °C) (Oral)    Resp 16    Ht 5' 3\" (1.6 m)    Wt 153 lb (69.4 kg)    SpO2 98%    BMI 27.1 kg/m2       General appearance - alert, mildly ill appearing, cooperative  Eyes - EOMI  Ears - bilateral TM's and external ear canals normal  Nose - normal nontender sinuses and mucosal congestion  Mouth - moist mucous membranes, mild injection in oropharynx, tonsils normal  Neck - supple, no significant adenopathy  Chest - clear to auscultation, no wheezes, rales or rhonchi, symmetric air entry  Heart - normal rate, regular rhythm, normal S1, S2, no murmurs, rubs, clicks or gallops  Neurological - alert, oriented, normal speech, no focal findings or movement disorder noted    Rapid strep: negative    Assessment/Plan    ICD-10-CM ICD-9-CM    1. Sore throat J02.9 462 AMB POC RAPID STREP A      fluticasone (FLONASE) 50 mcg/actuation nasal spray   2. PND (post-nasal drip) R09.82 784.91 fluticasone (FLONASE) 50 mcg/actuation nasal spray       Sore throat -- likely viral given lack of fever, 3 days of mild symptoms. Will treat conservatively. Advised that she take flonase and continue taking claritin. Follow-up Disposition:  Return if symptoms worsen or fail to improve.       I have discussed the diagnosis with the patient and the intended plan as seen in the above orders. The patient has received an after-visit summary and questions were answered concerning future plans. I have discussed medication side effects and warnings with the patient as well. Lucila Chau MD  Family Medicine Resident    Patient discussed with Dr. Gayathri Milan, attending physician.

## 2017-09-22 RX ORDER — METFORMIN HYDROCHLORIDE 500 MG/1
TABLET ORAL
Qty: 60 TAB | Refills: 1 | Status: SHIPPED | OUTPATIENT
Start: 2017-09-22 | End: 2018-04-02 | Stop reason: SDUPTHER

## 2017-09-22 NOTE — TELEPHONE ENCOUNTER
----- Message from Argenis Nunes sent at 9/22/2017 12:24 PM EDT -----  Regarding: Dr. Mike Franco is requesting that the doctor calls in a refill for Rx Metformin 500mg into the pharmacy.  ([facility])[phone]

## 2017-10-02 ENCOUNTER — OFFICE VISIT (OUTPATIENT)
Dept: FAMILY MEDICINE CLINIC | Age: 67
End: 2017-10-02

## 2017-10-02 VITALS
HEART RATE: 78 BPM | DIASTOLIC BLOOD PRESSURE: 80 MMHG | TEMPERATURE: 98 F | BODY MASS INDEX: 26.22 KG/M2 | RESPIRATION RATE: 16 BRPM | WEIGHT: 148 LBS | OXYGEN SATURATION: 100 % | SYSTOLIC BLOOD PRESSURE: 135 MMHG | HEIGHT: 63 IN

## 2017-10-02 DIAGNOSIS — M25.552 PAIN OF LEFT HIP JOINT: Primary | ICD-10-CM

## 2017-10-02 DIAGNOSIS — M70.62 TROCHANTERIC BURSITIS OF LEFT HIP: ICD-10-CM

## 2017-10-02 NOTE — PROGRESS NOTES
Rosalind Garcia is a 79 y.o. female who presents for left lateral hip pain for about 2 weeks. No injury or trauma. No new activities. No back pain. No radiating pain. Pain with sitting and laying on her left side. PMHx:  Past Medical History:   Diagnosis Date    [facility] ([facility] [address]     Gallstone     Hypertension     Invasive carcinoma [facility] ([facility] 1/31/2017       Meds:   Current Outpatient Prescriptions   Medication Sig Dispense Refill    metFORMIN (GLUCOPHAGE) 500 mg tablet TAKE 1 TAB BY MOUTH TWO (2) TIMES DAILY (WITH MEALS). 60 Tab 1    fluticasone (FLONASE) 50 mcg/actuation nasal spray 2 Sprays by Both Nostrils route daily. 1 Bottle 0    loratadine (CLARITIN) 10 mg tablet Take 1 Tab by mouth daily. 30 Tab 1    olmesartan-hydroCHLOROthiazide (BENICAR HCT) 40-12.5 mg per tablet Take 1 Tab by mouth daily. 90 Tab 3    alendronate (FOSAMAX) 35 mg tablet Take 1 Tab by mouth every seven (7) days. 13 Tab 3       Allergies:    Allergies   Allergen Reactions    Penicillins Rash and Swelling       Smoker:  History   Smoking Status    Never Smoker   Smokeless Tobacco    Never Used       ETOH:   History   Alcohol Use No       FH:   Family History   Problem Relation Age of Onset    Bleeding Prob Mother      Anyerism- Brain    Diabetes Father     Other Father      hep C    Stroke Sister     No Known Problems Brother     No Known Problems Brother     No Known Problems Brother     No Known Problems Brother     No Known Problems Brother     Lupus Sister     No Known Problems Sister     No Known Problems Sister     No Known Problems Sister     Migraines Daughter     No Known Problems Daughter        ROS:  General/Constitutional:   No headache, fever, fatigue, weight loss or weight gain       Eyes:   No redness, pruritis, pain, visual changes, swelling, or discharge      Ears:    No pain, loss or changes in hearing     Neck:   No swelling, masses, stiffness, pain, or limited movement     Cardiac: No chest pain      Respiratory:   No cough or shortness of breath     GI:   No nausea/vomiting, diarrhea, abdominal pain, bloody or dark stools       :   No dysuria or  hematuria    Neurological:   No loss of consciousness, dizziness, seizures, dysarthria, cognitive changes, memory changes,  problems with balance, or unilateral weakness     Skin: No rash     Physical Exam:  Visit Vitals    /80 (BP 1 Location: Left arm, BP Patient Position: Sitting)    Pulse 78    Temp 98 °F (36.7 °C) (Oral)    Resp 16    Ht 5' 3\" (1.6 m)    Wt 148 lb (67.1 kg)    SpO2 100%    BMI 26.22 kg/m2     Gen: NAD. Responds to all questions appropriately. Lungs: No labored respirations. Skin: No obvious rash  Ext: Well perfused. No edema. MSK - Hip left:    Deformity: None    ROM:     Flexion: Normal    Extension: Normal     Internal/external rotation: Normal      Gait: Normal       Palpation:    L1-L5: No tenderness    Sacrum: No tenderness    Coccyx: No tenderness    Left Paraspinal: No tenderness    Right Paraspinal: No tenderness    Greater trochanter: tenderness         Strength (0-5/5)    Hip Flexion:   Left: 5/5  Right: 5/5    Hip Extension:  Left: 5/5  Right: 5/5    Hip Abduction:  Left: 5/5  Right: 5/5    Hip Adduction:  Left: 5/5  Right: 5/5    Knee Extension:  Left: 5/5  Right: 5/5    Knee Flexion:   Left: 5/5  Right: 5/5     Sensation: Intact, no deficits      Special test:    Straight leg: Left: Negative  Right: Negative      Imaging: Radiographs of the left hip personally reviewed and demonstrates no obvious fracture or dislocation. PROCEDURE NOTE:     Informed consent obtained verbally and risks, benefits and alternatives discussed. Time out performed, cross checking patient ID and procedure. The left hip was cleaned and prepped with sterile technique using Chloraprep x3, anesthetized with ethyl chloride spray and the trochanteric bursa was injected with Celstone 12 mg and 3 ml of 1% lidocaine.   The patient tolerated the procedure well and there were no complications. Assessment:    ICD-10-CM ICD-9-CM    1. Pain of left hip joint M25.552 719.45 XR HIP LT W OR WO PELV 2-3 VWS      DRAIN/INJECT LARGE JOINT/BURSA   2. Trochanteric bursitis of left hip M70.62 726.5        Plan:  1. Home Exercise Program as per handout. 2. Ice 15 minutes, three times a day PRN and after exercise. Can alternate with heat for 15 minutes. 3. Corticosteroid injection as above. Medications:    1. Naproxin (Aleve): 220mg 1-2 tablets twice a day PRN. 2. Acetaminophen (Tylenol):  500mg 1-2 tablets every 6 hours as needed for pain.     RTC: PRN

## 2017-10-02 NOTE — PROGRESS NOTES
Chief Complaint   Patient presents with    Hip Pain     left hip pain x 2 weeks      1. Have you been to the ER, urgent care clinic since your last visit? Hospitalized since your last visit? No    2. Have you seen or consulted any other health care providers outside of the [address] [place] [address] since your last visit? Include any pap smears or colon screening.  No

## 2017-10-02 NOTE — MR AVS SNAPSHOT
Visit Information Date & Time Provider Department Dept. Phone Encounter #  
 10/2/2017 11:00 AM Karyn Nunes MD [address] [place] [address] [phone] [id number] Upcoming Health Maintenance Date Due  
 EYE EXAM RETINAL OR DILATED Q1 8/15/1960 DTaP/Tdap/Td series (1 - Tdap) 8/15/1971 FOBT Q 1 YEAR AGE 50-75 8/15/2000 ZOSTER VACCINE AGE 60> 6/15/2010 GLAUCOMA SCREENING Q2Y 8/15/2015 INFLUENZA AGE 9 TO ADULT 8/1/2017 MICROALBUMIN Q1 11/7/2017 LIPID PANEL Q1 11/7/2017 HEMOGLOBIN A1C Q6M 11/17/2017 Pneumococcal 65+ Low/Medium Risk (2 of 2 - PPSV23) 11/18/2017 FOOT EXAM Q1 5/17/2018 MEDICARE YEARLY EXAM 5/18/2018 BREAST CANCER SCRN MAMMOGRAM 1/9/2019 Allergies as of 10/2/2017  Review Complete On: 10/2/2017 By: Karyn Nunes MD  
  
 Severity Noted Reaction Type Reactions Penicillins  11/07/2016   Systemic Rash, Swelling Current Immunizations  Never Reviewed Name Date Influenza High Dose Vaccine PF 11/7/2016 Not reviewed this visit You Were Diagnosed With   
  
 Codes Comments Pain of left hip joint    -  Primary ICD-10-CM: M25.552 ICD-9-CM: 719.45 Trochanteric bursitis of left hip     ICD-10-CM: M70.62 ICD-9-CM: 726.5 Vitals BP Pulse Temp Resp Height(growth percentile) Weight(growth percentile) 135/80 (BP 1 Location: Left arm, BP Patient Position: Sitting) 78 98 °F (36.7 °C) (Oral) 16 5' 3\" (1.6 m) 148 lb (67.1 kg) SpO2 BMI OB Status Smoking Status 100% 26.22 kg/m2 Hysterectomy Never Smoker Vitals History BMI and BSA Data Body Mass Index Body Surface Area  
 26.22 kg/m 2 1.73 m 2 Preferred Pharmacy [facility] Name Phone [id number] [place] Blackman, [address] [facility] [phone] Your Updated Medication List  
  
   
This list is accurate as of: 10/2/17  4:43 PM.  Always use your most recent med list.  
  
  
  
  
 alendronate 35 mg tablet Commonly known as:  FOSAMAX Take 1 Tab by mouth every seven (7) days. fluticasone 50 mcg/actuation nasal spray Commonly known as:  Lella Solum 2 Sprays by Both Nostrils route daily. loratadine 10 mg tablet Commonly known as:  Alpheus Armenian Take 1 Tab by mouth daily. metFORMIN 500 mg tablet Commonly known as:  GLUCOPHAGE  
TAKE 1 TAB BY MOUTH TWO (2) TIMES DAILY (WITH MEALS). olmesartan-hydroCHLOROthiazide 40-12.5 mg per tablet Commonly known as:  BENICAR HCT Take 1 Tab by mouth daily. We Performed the Following DRAIN/INJECT LARGE JOINT/BURSA [id number] CPT(R)] To-Do List   
 10/02/2017 Imaging:  XR HIP LT W OR WO PELV 2-3 VWS Please provide this summary of care documentation to your next provider. Your primary care clinician is listed as Walter Gonzalez. If you have any questions after today's visit, please call [phone].

## 2017-10-02 NOTE — PROGRESS NOTES
[facility]  OFFICE PROCEDURE PROGRESS NOTE        Chart reviewed for the following: The Hang Cartwright MD, has reviewed the History, Physical and updated the Allergic reactions for [address] performed immediately prior to start of procedure: The physician, Jeannie Moreau MD, has performed the following reviews on Columba Acosta prior to the start of the procedure:            * Patient was identified by name and date of birth   * Agreement on procedure being performed was verified  * Risks and Benefits explained to the patient  * Procedure site verified and marked as necessary  * Patient was positioned for comfort  * Consent was signed and verified     Time: 1220pm      Date of procedure: 10/2/2017    Procedure performed by:  Hans Morgan MD    Provider assisted by: Anyi Sánchez LPN    Patient assisted by: self    How tolerated by patient: tolerated the procedure well with no complications    Post Procedural Pain Scale: 2 - Hurts Little Bit    Comments: none            .

## 2017-10-03 RX ORDER — BETAMETHASONE SODIUM PHOSPHATE AND BETAMETHASONE ACETATE 3; 3 MG/ML; MG/ML
6 INJECTION, SUSPENSION INTRA-ARTICULAR; INTRALESIONAL; INTRAMUSCULAR; SOFT TISSUE ONCE
Qty: 2 ML | Refills: 0
Start: 2017-10-03 | End: 2017-10-03

## 2017-10-03 RX ORDER — LIDOCAINE HYDROCHLORIDE 10 MG/ML
3 INJECTION INFILTRATION; PERINEURAL ONCE
Qty: 3 ML | Refills: 0
Start: 2017-10-03 | End: 2017-10-03

## 2017-12-27 ENCOUNTER — OFFICE VISIT (OUTPATIENT)
Dept: FAMILY MEDICINE CLINIC | Age: 67
End: 2017-12-27

## 2017-12-27 VITALS
OXYGEN SATURATION: 99 % | BODY MASS INDEX: 25.87 KG/M2 | HEIGHT: 63 IN | SYSTOLIC BLOOD PRESSURE: 134 MMHG | WEIGHT: 146 LBS | TEMPERATURE: 98.2 F | DIASTOLIC BLOOD PRESSURE: 76 MMHG | HEART RATE: 71 BPM | RESPIRATION RATE: 16 BRPM

## 2017-12-27 DIAGNOSIS — Z23 ENCOUNTER FOR IMMUNIZATION: ICD-10-CM

## 2017-12-27 DIAGNOSIS — M70.62 TROCHANTERIC BURSITIS, LEFT HIP: Primary | ICD-10-CM

## 2017-12-27 NOTE — PROGRESS NOTES
Donovan Lopez  79 y.o. female  1950  [address]  [facility] [address]  [id number]   [address]: Progress Note  Alvaro Keith MD       Encounter Date: 12/27/2017    Chief Complaint   Patient presents with    Follow-up     hip pain--left side     History of Present Illness   Donovan Lopez is a 79 y.o. female who presents to clinic today for follow-up on left hip pain. Has previously received trochanteric bursa injection with short term relief. Notes return of pain, with radiation down lateral thigh. Denies falls or weakness, but is effecting her gait. Review of Systems   Review of Systems   Musculoskeletal: Positive for joint pain. Negative for falls. Vitals/Objective:     Vitals:    12/27/17 1313 12/27/17 1404   BP: 149/87 134/76   Pulse: 77 71   Resp: 16    Temp: 98.2 °F (36.8 °C)    TempSrc: Oral    SpO2: 99%    Weight: 146 lb (66.2 kg)    Height: 5' 3\" (1.6 m)      Body mass index is 25.86 kg/(m^2). Physical Exam   Cardiovascular: Normal rate, regular rhythm and normal heart sounds. Pulses:       Dorsalis pedis pulses are 2+ on the left side. Pulmonary/Chest: Breath sounds normal. She has no wheezes. Musculoskeletal:        Left hip: She exhibits decreased range of motion (Pain with flexion to and past 90 degrees. +THONY) and tenderness (Tenderness over left trochanter and posteriorly to SI joint. ). Neurological: She displays no atrophy. No sensory deficit. Gait normal.   Reflex Scores:       Patellar reflexes are 2+ on the right side and 2+ on the left side. No results found for this or any previous visit (from the past 24 hour(s)). Assessment and Plan:   1. Trochanteric bursitis, left hip  Some improvement with steroid injection. At this point, she is likely to benefit most from formal PT for stretching/strengthening and ROM exercises. Contact information for [facility] and [facility] given.   Home stretching reviewed for patient to start while waiting to get in with PT.  - REFERRAL TO PHYSICAL THERAPY    2. Encounter for immunization  - Influenza virus vaccine (SEASONAL), 3 years and older, IM (00249)      I have discussed the diagnosis with the patient and the intended plan as seen in the above orders. she has expressed understanding. The patient has received an after-visit summary and questions were answered concerning future plans. I have discussed medication side effects and warnings with the patient as well. Follow-up Disposition:  Return if symptoms worsen or fail to improve. Electronically Signed: Alvaro Keith MD     History   Patients past medical, surgical and family histories were reviewed and updated. Past Medical History:   Diagnosis Date    Diabetes ([facility] [address]     Gallstone     Hypertension     Invasive carcinoma of breast ([facility] [address] 1/31/2017     Past Surgical History:   Procedure Laterality Date    HX GYN  1984    hysterectomy - Fibroids     Family History   Problem Relation Age of Onset    Bleeding Prob Mother      Anyerism- Brain    Diabetes Father     Other Father      hep C    Stroke Sister     No Known Problems Brother     No Known Problems Brother     No Known Problems Brother     No Known Problems Brother     No Known Problems Brother     Lupus Sister     No Known Problems Sister     No Known Problems Sister     No Known Problems Sister     Migraines Daughter     No Known Problems Daughter      Social History     Social History    Marital status:      Spouse name: N/A    Number of children: N/A    Years of education: N/A     Occupational History    Not on file.      Social History Main Topics    Smoking status: Never Smoker    Smokeless tobacco: Never Used    Alcohol use No    Drug use: No    Sexual activity: Not Currently     Birth control/ protection: Surgical     Other Topics Concern    Not on file     Social History Narrative            Current Medications/Allergies Current Outpatient Prescriptions   Medication Sig Dispense Refill    metFORMIN (GLUCOPHAGE) 500 mg tablet TAKE 1 TAB BY MOUTH TWO (2) TIMES DAILY (WITH MEALS). 60 Tab 1    fluticasone (FLONASE) 50 mcg/actuation nasal spray 2 Sprays by Both Nostrils route daily. 1 Bottle 0    loratadine (CLARITIN) 10 mg tablet Take 1 Tab by mouth daily. 30 Tab 1    olmesartan-hydroCHLOROthiazide (BENICAR HCT) 40-12.5 mg per tablet Take 1 Tab by mouth daily. 90 Tab 3    alendronate (FOSAMAX) 35 mg tablet Take 1 Tab by mouth every seven (7) days.  13 Tab 3     Allergies   Allergen Reactions    Penicillins Rash and Swelling

## 2017-12-27 NOTE — MR AVS SNAPSHOT
Visit Information Date & Time Provider Department Dept. Phone Encounter #  
 12/27/2017  1:20 PM Rob Hdz De Guzman [phone] [id number] Follow-up Instructions Return if symptoms worsen or fail to improve. Upcoming Health Maintenance Date Due  
 EYE EXAM RETINAL OR DILATED Q1 8/15/1960 DTaP/Tdap/Td series (1 - Tdap) 8/15/1971 FOBT Q 1 YEAR AGE 50-75 8/15/2000 ZOSTER VACCINE AGE 60> 6/15/2010 GLAUCOMA SCREENING Q2Y 8/15/2015 Influenza Age 5 to Adult 8/1/2017 MICROALBUMIN Q1 11/7/2017 LIPID PANEL Q1 11/7/2017 HEMOGLOBIN A1C Q6M 11/17/2017 Pneumococcal 65+ Low/Medium Risk (2 of 2 - PPSV23) 11/18/2017 FOOT EXAM Q1 5/17/2018 MEDICARE YEARLY EXAM 5/18/2018 Allergies as of 12/27/2017  Review Complete On: 12/27/2017 By: Delfino Araujo LPN Severity Noted Reaction Type Reactions Penicillins  11/07/2016   Systemic Rash, Swelling Current Immunizations  Never Reviewed Name Date Influenza High Dose Vaccine PF 11/7/2016 Influenza Vaccine 12/27/2017 Not reviewed this visit You Were Diagnosed With   
  
 Codes Comments Trochanteric bursitis, left hip    -  Primary ICD-10-CM: M70.62 ICD-9-CM: 726.5 Encounter for immunization     ICD-10-CM: P86 ICD-9-CM: V03.89 Vitals BP Pulse Temp Resp Height(growth percentile) Weight(growth percentile) 149/87 77 98.2 °F (36.8 °C) (Oral) 16 5' 3\" (1.6 m) 146 lb (66.2 kg) SpO2 BMI OB Status Smoking Status 99% 25.86 kg/m2 Hysterectomy Never Smoker Vitals History BMI and BSA Data Body Mass Index Body Surface Area  
 25.86 kg/m 2 1.72 m 2 Preferred Pharmacy Pharmacy Name Phone [id number] [place], [address] [phone] Your Updated Medication List  
  
   
This list is accurate as of: 12/27/17  1:59 PM.  Always use your most recent med list.  
  
  
  
  
 alendronate 35 mg tablet Commonly known as:  FOSAMAX Take 1 Tab by mouth every seven (7) days. fluticasone 50 mcg/actuation nasal spray Commonly known as:  [place] 2 Sprays by Both Nostrils route daily. loratadine 10 mg tablet Commonly known as:  Landon Colbert Take 1 Tab by mouth daily. metFORMIN 500 mg tablet Commonly known as:  GLUCOPHAGE  
TAKE 1 TAB BY MOUTH TWO (2) TIMES DAILY (WITH MEALS). olmesartan-hydroCHLOROthiazide 40-12.5 mg per tablet Commonly known as:  BENICAR HCT Take 1 Tab by mouth daily. We Performed the Following INFLUENZA VIRUS VACCINE,(SEASONAL),SPLIT, IN INDIVIDS. >=3 YRS OF AGE, IM [99323 CPT(R)] REFERRAL TO PHYSICAL THERAPY [NMF23 Custom] Comments:  
 Please evaluate and treat patient for trochanteric bursitits Follow-up Instructions Return if symptoms worsen or fail to improve. Referral Information Referral ID Referred By Referred To  
  
 [id number] Irineo Yu Not Available Visits Status Start Date End Date 1 New Request 12/27/17 12/27/18 If your referral has a status of pending review or denied, additional information will be sent to support the outcome of this decision. Patient Instructions Trochanteric Bursitis: Exercises Your Care Instructions Here are some examples of typical rehabilitation exercises for your condition. Start each exercise slowly. Ease off the exercise if you start to have pain. Your doctor or physical therapist will tell you when you can start these exercises and which ones will work best for you. How to do the exercises Hamstring wall stretch 1. Lie on your back in a doorway, with your good leg through the open door. 2. Slide your affected leg up the wall to straighten your knee. You should feel a gentle stretch down the back of your leg. 1. Do not arch your back. 2. Do not bend either knee. 3. Keep one heel touching the floor and the other heel touching the wall. Do not point your toes. 3. Hold the stretch for at least 1 minute to begin. Then try to lengthen the time you hold the stretch to as long as 6 minutes. 4. Repeat 2 to 4 times. 5. If you do not have a place to do this exercise in a doorway, there is another way to do it: 6. Lie on your back, and bend the knee of your affected leg. 7. Loop a towel under the ball and toes of that foot, and hold the ends of the towel in your hands. 8. Straighten your knee, and slowly pull back on the towel. You should feel a gentle stretch down the back of your leg. 9. Hold the stretch for 15 to 30 seconds. Or even better, hold the stretch for 1 minute if you can. 10. Repeat 2 to 4 times. Straight-leg raises to the outside 1. Lie on your side, with your affected leg on top. 2. Tighten the front thigh muscles of your top leg to keep your knee straight. 3. Keep your hip and your leg straight in line with the rest of your body, and keep your knee pointing forward. Do not drop your hip back. 4. Lift your top leg straight up toward the ceiling, about 12 inches off the floor. Hold for about 6 seconds, then slowly lower your leg. 5. Repeat 8 to 12 times. Clamshell 1. Lie on your side, with your affected leg on top and your head propped on a pillow. Keep your feet and knees together and your knees bent. 2. Raise your top knee, but keep your feet together. Do not let your hips roll back. Your legs should open up like a clamshell. 3. Hold for 6 seconds. 4. Slowly lower your knee back down. Rest for 10 seconds. 5. Repeat 8 to 12 times. Standing quadriceps stretch 1. If you are not steady on your feet, hold on to a chair, counter, or wall. You can also lie on your stomach or your side to do this exercise.  
2. Bend the knee of the leg you want to stretch, and reach behind you to grab the front of your foot or ankle with the hand on the same side. For example, if you are stretching your right leg, use your right hand. 3. Keeping your knees next to each other, pull your foot toward your buttock until you feel a gentle stretch across the front of your hip and down the front of your thigh. Your knee should be pointed directly to the ground, and not out to the side. 4. Hold the stretch for 15 to 30 seconds. 5. Repeat 2 to 4 times. Piriformis stretch 1. Lie on your back with your legs straight. 2. Lift your affected leg and bend your knee. With your opposite hand, reach across your body, and then gently pull your knee toward your opposite shoulder. 3. Hold the stretch for 15 to 30 seconds. 4. Repeat 2 to 4 times. Double knee-to-chest 
 
1. Lie on your back with your knees bent and your feet flat on the floor. You can put a small pillow under your head and neck if it is more comfortable. 2. Bring both knees to your chest. 
3. Keep your lower back pressed to the floor. Hold for 15 to 30 seconds. 4. Relax, and lower your knees to the starting position. 5. Repeat 2 to 4 times. Follow-up care is a key part of your treatment and safety. Be sure to make and go to all appointments, and call your doctor if you are having problems. It's also a good idea to know your test results and keep a list of the medicines you take. Where can you learn more? Go to http://charles-kristin.info/. Enter L018 in the search box to learn more about \"Trochanteric Bursitis: Exercises. \" Current as of: March 21, 2017 Content Version: 11.4 © 8783-0777 Healthwise, Incorporated. Care instructions adapted under license by [employer] (which disclaims liability or warranty for this information).  If you have questions about a medical condition or this instruction, always ask your healthcare professional. [place] [address] any warranty or liability for your use of this information. Sacroiliac Pain: Exercises Your Care Instructions Here are some examples of typical rehabilitation exercises for your condition. Start each exercise slowly. Ease off the exercise if you start to have pain. Your doctor or physical therapist will tell you when you can start these exercises and which ones will work best for you. How to do the exercises Knee-to-chest stretch 11. Do not do the knee-to-chest exercise if it causes or increases back or leg pain. 12. Lie on your back with your knees bent and your feet flat on the floor. You can put a small pillow under your head and neck if it is more comfortable. 15. Grasp your hands under one knee and bring the knee to your chest, keeping the other foot flat on the floor. 14. Keep your lower back pressed to the floor. Hold for at least 15 to 30 seconds. 15. Relax and lower the knee to the starting position. Repeat with the other leg. 16. Repeat 2 to 4 times with each leg. 17. To get more stretch, keep your other leg flat on the floor while pulling your knee to your chest. 
Bridging 6. Lie on your back with both knees bent. Your knees should be bent about 90 degrees. 7. Tighten your belly muscles by pulling in your belly button toward your spine. Then push your feet into the floor, squeeze your buttocks, and lift your hips off the floor until your shoulders, hips, and knees are all in a straight line. 8. Hold for about 6 seconds as you continue to breathe normally, and then slowly lower your hips back down to the floor and rest for up to 10 seconds. 9. Repeat 8 to 12 times. Hip extension 6. Get down on your hands and knees on the floor. 7. Keeping your back and neck straight, lift one leg straight out behind you. When you lift your leg, keep your hips level. Don't let your back twist, and don't let your hip drop toward the floor. 8. Hold for 6 seconds. Repeat 8 to 12 times with each leg. 9. If you feel steady and strong when you do this exercise, you can make it more difficult. To do this, when you lift your leg, also lift the opposite arm straight out in front of you. For example, lift the left leg and the right arm at the same time. (This is sometimes called the \"bird dog exercise. \") Hold for 6 seconds, and repeat 8 to 12 times on each side. Clamshell 6. Lie on your side with a pillow under your head. Keep your feet and knees together and your knees bent. 7. Raise your top knee, but keep your feet together. Do not let your hips roll back. Your legs should open up like a clamshell. 8. Hold for 6 seconds. 9. Slowly lower your knee back down. Rest for 10 seconds. 10. Repeat 8 to 12 times. 11. Switch to your other side and repeat steps 1 through 5. Hamstring wall stretch 5. Lie on your back in a doorway, with one leg through the open door. 6. Slide your affected leg up the wall to straighten your knee. You should feel a gentle stretch down the back of your leg. 1. Do not arch your back. 2. Do not bend either knee. 3. Keep one heel touching the floor and the other heel touching the wall. Do not point your toes. 7. Hold the stretch for at least 1 minute to begin. Then try to lengthen the time you hold the stretch to as long as 6 minutes. 8. Switch legs, and repeat steps 1 through 3. 
9. Repeat 2 to 4 times. 10. If you do not have a place to do this exercise in a doorway, there is another way to do it: 
11. Lie on your back, and bend one knee. 12. Loop a towel under the ball and toes of that foot, and hold the ends of the towel in your hands. 13. Straighten your knee, and slowly pull back on the towel. You should feel a gentle stretch down the back of your leg. 14. Switch legs, and repeat steps 1 through 3. 
15. Repeat 2 to 4 times. Lower abdominal strengthening 6. Lie on your back with your knees bent and your feet flat on the floor. 7. Tighten your belly muscles by pulling your belly button in toward your spine. 8. Lift one foot off the floor and bring your knee toward your chest, so that your knee is straight above your hip and your leg is bent like the letter \"L. \" 
9. Lift the other knee up to the same position. 10. Lower one leg at a time to the starting position. 11. Keep alternating legs until you have lifted each leg 8 to 12 times. 12. Be sure to keep your belly muscles tight and your back still as you are moving your legs. Be sure to breathe normally. Piriformis stretch 1. Lie on your back with your legs straight. 2. Lift your affected leg, and bend your knee. With your opposite hand, reach across your body, and then gently pull your knee toward your opposite shoulder. 3. Hold the stretch for 15 to 30 seconds. 4. Switch legs and repeat steps 1 through 3. 
5. Repeat 2 to 4 times. Follow-up care is a key part of your treatment and safety. Be sure to make and go to all appointments, and call your doctor if you are having problems. It's also a good idea to know your test results and keep a list of the medicines you take. Where can you learn more? Go to http://charles-kristin.info/. Enter A506 in the search box to learn more about \"Sacroiliac Pain: Exercises. \" Current as of: March 21, 2017 Content Version: 11.4 © 8871-7493 Healthwise, [employer]. Care instructions adapted under license by [employer] (which disclaims liability or warranty for this information). If you have questions about a medical condition or this instruction, always ask your healthcare professional. [address] any warranty or liability for your use of this information. Introducing [facility]! Dear Catalino Congress: 
Thank you for requesting a BI[employer] account.   Our records indicate that you have previously registered for a BIOSAFE account but its currently inactive. Please call our [employer] support line at [phone]. Additional Information If you have questions, please visit the Frequently Asked Questions section of the [employer] website at [email or web address] Remember, [employer] is NOT to be used for urgent needs. For medical emergencies, dial 911. Now available from your iPhone and Android! Please provide this summary of care documentation to your next provider. Your primary care clinician is listed as Ayden Alicea. If you have any questions after today's visit, please call [phone].

## 2017-12-27 NOTE — PROGRESS NOTES
Chief Complaint   Patient presents with    Follow-up     hip pain--left side     1. Have you been to the ER, urgent care clinic since your last visit? Hospitalized since your last visit? No    2. Have you seen or consulted any other health care providers outside of the [address] [place] [address] since your last visit? Include any pap smears or colon screening. No    Eye exam--will get one done soon- normal exam done     No foot exam done    Labs to check A1C    Has had tdap--need a pneumococcal and flu shot    .

## 2018-01-30 ENCOUNTER — TELEPHONE (OUTPATIENT)
Dept: FAMILY MEDICINE CLINIC | Age: 68
End: 2018-01-30

## 2018-01-30 DIAGNOSIS — C50.919 INVASIVE CARCINOMA OF BREAST (HCC): Primary | ICD-10-CM

## 2018-01-31 NOTE — TELEPHONE ENCOUNTER
Call to follow-up on Breast cancer. \"Mychal Verma manage our cancer survivorship program for [address] [place] [address] and have had Ms. Sawant on my list for a survivorship care plan ever since her bx on 1/25/17.  I'm checking back now over my spreadsheet from 2017 to see who I need to provide these for in 2018.  It doesn't look like she ever saw Dr. Trung Barrett had an appt on 2/7/17 that she canceled and another was made for 2/14/17 that the practice needed to cancel. Ade Vargas wanted you to know because I don't see any scanned notes in media from any other breast surgeon either. Tucker Carlson you! Yana Tong NP, [address] [facility] \"

## 2018-02-02 NOTE — TELEPHONE ENCOUNTER
I spoke with patient this Am. She stated that she never followed up with the breast surgeon last year and that life just kirti happened. She had a lot going on and didn't take care of herself. Says that she is ready to take care of herself and will reach out to schedule the appointment. States that she still has all the information. I stressed importance of being seen as the longer she waits, the worse things can get. She expressed understanding. Notified Rah Carranza with Med [facility]. Will follow closely to assist in anyway. Review of prior results:  pathology  FINDINGS: The pathology report reveals:  1. Left breast, sample 1, core biopsies  Invasive mammary carcinoma with lobular features. 2. Left breast, sample 2, core biopsies  Invasive mammary carcinoma with lobular features  Lobular carcinoma in situ      ER 95% positive; CT 95% positive; HER-2/kranthi equivocal (2+).      IMPRESSION: This is a concordant result. The patient elected to hear results  from her primary physician. I am available for further questions as needed.      RECOMMENDATION: Surgical excision. Referral to a subspecialist breast surgeon. Further, breast MRI is recommended to evaluate extent of disease and the  possibility of contralateral disease. 11:15 AM  Spoke with Shen Hernandez at [facility]. They have agreed to reach out to patient.

## 2018-02-05 ENCOUNTER — TELEPHONE (OUTPATIENT)
Dept: SURGERY | Age: 68
End: 2018-02-05

## 2018-02-05 DIAGNOSIS — Z17.0 MALIGNANT NEOPLASM OF OVERLAPPING SITES OF LEFT BREAST IN FEMALE, ESTROGEN RECEPTOR POSITIVE (HCC): Primary | ICD-10-CM

## 2018-02-05 DIAGNOSIS — C50.812 MALIGNANT NEOPLASM OF OVERLAPPING SITES OF LEFT BREAST IN FEMALE, ESTROGEN RECEPTOR POSITIVE (HCC): Primary | ICD-10-CM

## 2018-02-05 PROBLEM — C50.912 BREAST CANCER, LEFT (HCC): Status: ACTIVE | Noted: 2017-01-31

## 2018-02-05 NOTE — TELEPHONE ENCOUNTER
Left a message on the patient's mobile phone to introduce myself and request she call me back so I could help to have her see Dr. Kamla Falk. This information was given to us by Patrizia Zepeda due to her breast cancer diagnosed 1/2017 and no follow up by a breast surgeon. Dr. Kamla Falk was made aware.

## 2018-02-05 NOTE — TELEPHONE ENCOUNTER
The patient does have an appointment scheduled with Dr. Ashanti Carney for 2/13/2018 that she made today. I called her back to leave a message that a mammogram is needed on the same day prior to her appointment or any day that is convenient prior to her appointment and the scheduling number was given to her to call at her earliest convenience. [phone].

## 2018-02-09 ENCOUNTER — TELEPHONE (OUTPATIENT)
Dept: FAMILY MEDICINE CLINIC | Age: 68
End: 2018-02-09

## 2018-03-08 ENCOUNTER — TELEPHONE (OUTPATIENT)
Dept: FAMILY MEDICINE CLINIC | Age: 68
End: 2018-03-08

## 2018-03-13 ENCOUNTER — TELEPHONE (OUTPATIENT)
Dept: FAMILY MEDICINE CLINIC | Age: 68
End: 2018-03-13

## 2018-03-13 NOTE — TELEPHONE ENCOUNTER
Pharmacy calling about patient being diagnosed with Diabetes, Pharmacy is stating it is recommended patients with Diabetes to be on some kind of statin, please call pharmacy at [phone], thank you.

## 2018-03-19 NOTE — TELEPHONE ENCOUNTER
I called and left a message for patient letting her know that she needs to come in for a follow up appt.

## 2018-03-30 RX ORDER — OLMESARTAN MEDOXOMIL AND HYDROCHLOROTHIAZIDE 40/12.5 40; 12.5 MG/1; MG/1
1 TABLET ORAL DAILY
Qty: 90 TAB | Refills: 0 | Status: SHIPPED | OUTPATIENT
Start: 2018-03-30 | End: 2018-04-02 | Stop reason: SDUPTHER

## 2018-03-30 NOTE — TELEPHONE ENCOUNTER
Please notify the patient that the Rx was sent to the pharmacy. Also, I'd like to follow-up with her in clinic within the next 4 weeks. Can we schedule an appointment?

## 2018-04-02 ENCOUNTER — OFFICE VISIT (OUTPATIENT)
Dept: FAMILY MEDICINE CLINIC | Age: 68
End: 2018-04-02

## 2018-04-02 VITALS
WEIGHT: 146 LBS | OXYGEN SATURATION: 99 % | RESPIRATION RATE: 17 BRPM | DIASTOLIC BLOOD PRESSURE: 76 MMHG | TEMPERATURE: 98.7 F | HEART RATE: 80 BPM | HEIGHT: 63 IN | BODY MASS INDEX: 25.87 KG/M2 | SYSTOLIC BLOOD PRESSURE: 132 MMHG

## 2018-04-02 DIAGNOSIS — E11.9 CONTROLLED TYPE 2 DIABETES MELLITUS WITHOUT COMPLICATION, WITHOUT LONG-TERM CURRENT USE OF INSULIN (HCC): Primary | ICD-10-CM

## 2018-04-02 DIAGNOSIS — C50.912 MALIGNANT NEOPLASM OF LEFT FEMALE BREAST, UNSPECIFIED ESTROGEN RECEPTOR STATUS, UNSPECIFIED SITE OF BREAST (HCC): ICD-10-CM

## 2018-04-02 DIAGNOSIS — I10 ESSENTIAL HYPERTENSION: ICD-10-CM

## 2018-04-02 LAB
ALBUMIN UR QL STRIP: 10 MG/L
CREATININE, URINE POC: 200 MG/DL
MICROALBUMIN/CREAT RATIO POC: <30 MG/G

## 2018-04-02 RX ORDER — METFORMIN HYDROCHLORIDE 500 MG/1
TABLET ORAL
Qty: 180 TAB | Refills: 3 | Status: SHIPPED | OUTPATIENT
Start: 2018-04-02 | End: 2019-06-30 | Stop reason: SDUPTHER

## 2018-04-02 RX ORDER — OLMESARTAN MEDOXOMIL AND HYDROCHLOROTHIAZIDE 40/12.5 40; 12.5 MG/1; MG/1
1 TABLET ORAL DAILY
Qty: 90 TAB | Refills: 3 | Status: SHIPPED | OUTPATIENT
Start: 2018-04-02 | End: 2019-04-23 | Stop reason: SDUPTHER

## 2018-04-02 NOTE — MR AVS SNAPSHOT
[address] 
[phone] Patient: Liam Corley MRN: [id number] [facility] [place]:[birth year] Visit Information Date & Time Provider Department Dept. Phone Encounter #  
 4/2/2018  1:20 PM Sarita Blanchard, [facility]5 [facility] [phone] [id number] Follow-up Instructions Return in about 6 months (around 10/2/2018). Upcoming Health Maintenance Date Due  
 EYE EXAM RETINAL OR DILATED Q1 8/15/1960 DTaP/Tdap/Td series (1 - Tdap) 8/15/1971 FOBT Q 1 YEAR AGE 50-75 8/15/2000 ZOSTER VACCINE AGE 60> 6/15/2010 GLAUCOMA SCREENING Q2Y 8/15/2015 Pneumococcal 65+ High/Highest Risk (1 of 2 - PCV13) 8/15/2015 MICROALBUMIN Q1 11/7/2017 LIPID PANEL Q1 11/7/2017 HEMOGLOBIN A1C Q6M 11/17/2017 FOOT EXAM Q1 5/17/2018 MEDICARE YEARLY EXAM 5/18/2018 BREAST CANCER SCRN MAMMOGRAM 1/9/2019 Allergies as of 4/2/2018  Review Complete On: 4/2/2018 By: Johnathon Amaya LPN Severity Noted Reaction Type Reactions Penicillins  11/07/2016   Systemic Rash, Swelling Current Immunizations  Never Reviewed Name Date Influenza High Dose Vaccine PF 11/7/2016 Influenza Vaccine 12/27/2017 Not reviewed this visit You Were Diagnosed With   
  
 Codes Comments Controlled type 2 diabetes mellitus without complication, without long-term current use of insulin ([facility]    -  Primary ICD-10-CM: E11.9 ICD-9-CM: 250.00 Essential hypertension     ICD-10-CM: I10 
ICD-9-CM: 401.9 Malignant neoplasm of left female breast, unspecified estrogen receptor status, unspecified site of breast ([facility]     ICD-10-CM: O08.387 ICD-9-CM: 174.9 Vitals BP Pulse Temp Resp Height(growth percentile) Weight(growth percentile) 132/76 80 98.7 °F (37.1 °C) (Oral) 17 5' 3\" (1.6 m) 146 lb (66.2 kg) SpO2 BMI OB Status Smoking Status 99% 25.86 kg/m2 Hysterectomy Never Smoker Vitals History BMI and BSA Data Body Mass Index Body Surface Area  
 25.86 kg/m 2 1.72 m 2 Preferred Pharmacy Pharmacy Name Phone [facility]/PHARMACY #[id number] Fernandez, [address] [phone] Your Updated Medication List  
  
   
This list is accurate as of 4/2/18  1:59 PM.  Always use your most recent med list.  
  
  
  
  
 alendronate 35 mg tablet Commonly known as:  FOSAMAX Take 1 Tab by mouth every seven (7) days. fluticasone 50 mcg/actuation nasal spray Commonly known as:  [place] 2 Sprays by Both Nostrils route daily. loratadine 10 mg tablet Commonly known as:  Susa Fuchs Take 1 Tab by mouth daily. metFORMIN 500 mg tablet Commonly known as:  GLUCOPHAGE  
TAKE 1 TAB BY MOUTH TWO (2) TIMES DAILY (WITH MEALS). olmesartan-hydroCHLOROthiazide 40-12.5 mg per tablet Commonly known as:  BENICAR HCT Take 1 Tab by mouth daily. Prescriptions Sent to Pharmacy Refills  
 metFORMIN (GLUCOPHAGE) 500 mg tablet 3 Sig: TAKE 1 TAB BY MOUTH TWO (2) TIMES DAILY (WITH MEALS). Class: Normal  
 Pharmacy: [facility]/pharmacy #[id number] Ph #: [phone]  
 olmesartan-hydroCHLOROthiazide (BENICAR HCT) 40-12.5 mg per tablet 3 Sig: Take 1 Tab by mouth daily. Class: Normal  
 Pharmacy: [facility]/pharmacy #[id number] Ph #: [phone] Route: Oral  
  
We Performed the Following AMB POC URINE, MICROALBUMIN, SEMIQUANT (3 RESULTS) [44995 CPT(R)] CBC W/O DIFF [73628 CPT(R)] HEMOGLOBIN A1C WITH EAG [82370 CPT(R)] LIPID PANEL [12036 CPT(R)] REFERRAL TO BREAST SURGERY [REF10 Custom] Follow-up Instructions Return in about 6 months (around 10/2/2018). Referral Information Referral ID Referred By Referred To [id number] Florencio CLARKE MD [address] [place] [address], [address] [facility] Phone: [phone] Fax: [phone] Visits Status Start Date End Date 1 New Request 4/2/18 4/2/19 If your referral has a status of pending review or denied, additional information will be sent to support the outcome of this decision. Patient Instructions Breast Cancer: Care Instructions Your Care Instructions Breast cancer occurs when abnormal cells grow out of control in the breast. These cancer cells can spread within the breast, to nearby lymph nodes and other tissues, and to other parts of the body. Being treated for cancer can weaken your body, and you may feel very tired. Get the rest your body needs so you can feel better. Finding out that you have cancer is scary. You may feel many emotions and may need some help coping. Seek out family, friends, and counselors for support. You also can do things at home to make yourself feel better while you go through treatment. Call the [facility] Bonnie Jeong ([phone]) or visit its website at [address] [employer] [facility] for more information. Follow-up care is a key part of your treatment and safety. Be sure to make and go to all appointments, and call your doctor if you are having problems. It's also a good idea to know your test results and keep a list of the medicines you take. How can you care for yourself at home? · Take your medicines exactly as prescribed. Call your doctor if you think you are having a problem with your medicine. You may get medicine for nausea and vomiting if you have these side effects. · Follow your doctor's instructions to relieve pain. Pain from cancer and surgery can almost always be controlled. Use pain medicine when you first notice pain, before it becomes severe. · Eat healthy food.  If you do not feel like eating, try to eat food that has protein and extra calories to keep up your strength and prevent weight loss. Drink liquid meal replacements for extra calories and protein. Try to eat your main meal early. · Get some physical activity every day, but do not get too tired. Keep doing the hobbies you enjoy as your energy allows. · Do not smoke. Smoking can make your cancer worse. If you need help quitting, talk to your doctor about stop-smoking programs and medicines. These can increase your chances of quitting for good. · Take steps to control your stress and workload. Learn relaxation techniques. ¨ Share your feelings. Stress and tension affect our emotions. By expressing your feelings to others, you may be able to understand and cope with them. ¨ Consider joining a support group. Talking about a problem with your spouse, a good friend, or other people with similar problems is a good way to reduce tension and stress. ¨ Express yourself through art. Try writing, crafts, dance, or art to relieve stress. Some dance, writing, or art groups may be available just for people who have cancer. ¨ Be kind to your body and mind. Getting enough sleep, eating a healthy diet, and taking time to do things you enjoy can contribute to an overall feeling of balance in your life and can help reduce stress. ¨ Get help if you need it. Discuss your concerns with your doctor or counselor. · If you are vomiting or have diarrhea: ¨ Drink plenty of fluids (enough so that your urine is light yellow or clear like water) to prevent dehydration. Choose water and other caffeine-free clear liquids. If you have kidney, heart, or liver disease and have to limit fluids, talk with your doctor before you increase the amount of fluids you drink. ¨ When you are able to eat, try clear soups, mild foods, and liquids until all symptoms are gone for 12 to 48 hours. Other good choices include dry toast, crackers, cooked cereal, and gelatin dessert, such as Jell-O. · If you have not already done so, prepare a list of advance directives. Advance directives are instructions to your doctor and family members about what kind of care you want if you become unable to speak or express yourself. When should you call for help? Call 911 anytime you think you may need emergency care. For example, call if: 
? · You passed out (lost consciousness). ?Call your doctor now or seek immediate medical care if: 
? · You have a fever. ? · You have abnormal bleeding. ? · You think you have an infection. ? · You have new or worse pain. ? · You have new symptoms, such as a cough, belly pain, vomiting, diarrhea, or a rash. ? Watch closely for changes in your health, and be sure to contact your doctor if: 
? · You are much more tired than usual.  
? · You have swollen glands in your armpits, groin, or neck. ? · You do not get better as expected. Where can you learn more? Go to http://charles-kristin.info/. Enter V321 in the search box to learn more about \"Breast Cancer: Care Instructions. \" Current as of: May 12, 2017 Content Version: 11.4 © 9070-1506 [employer]. Care instructions adapted under license by [employer] (which disclaims liability or warranty for this information). If you have questions about a medical condition or this instruction, always ask your healthcare professional. [place] [address] any warranty or liability for your use of this information. Learning About Breast Cancer Treatments Your Care Instructions Breast cancer means abnormal cells grow out of control in one or both breasts. These cancer cells can spread from the breast to nearby lymph nodes and other tissues. They can also spread to other parts of the body. The type and stage of cancer you have is based on: · Where in the breast the cancer started. · The genetics of those cancer cells. · How far cancer has spread within the breast, to nearby tissues, or to other organs. · What the cancer cells look like under a microscope. · Whether there is cancer in the nearby lymph nodes. Tests that help find the stage of your cancer can help choose the right treatment for you. These tests can include a breast biopsy, lymph node biopsy, blood tests, and X-rays. You may need other tests as well, such as a bone, CT, or MRI scan. Whether you have more tests depends on your symptoms and the stage of the cancer. When you find out that you have cancer, you may feel many emotions and may need some help coping. Seek out family, friends, and counselors for support. You also can do things at home to make yourself feel better while you go through treatment. Call the [employer] (6-216.741.1000) or visit its website at [employer] for more information. How is breast cancer treated? Your doctor may combine treatments. This is a common way to treat breast cancer. Treatment depends on what type and stage of cancer you have. You may have: · Surgery to remove the cancer. · Radiation. This uses high-dose X-rays to kill cancer cells and shrink tumors. · Chemotherapy. This uses medicine to kill cancer cells. · Hormone therapy. This uses medicines such as tamoxifen. It limits the effect of the hormone estrogen. This hormone can help some types of breast cancer cells to grow. · Biological therapy. This uses medicines such as trastuzumab (Herceptin) to help your immune system fight the cancer. What surgeries are done for breast cancer? Surgery is a key part of treatment for breast cancer. The main types of surgeries are: · Breast-conserving surgery. This is surgery that does not remove the whole breast. This includes: 
¨ Lumpectomy. This surgery removes the cancer in the breast and some of the normal tissue around it. ¨ Partial mastectomy.  This surgery removes part of the breast. The lining of the chest muscles below the cancer and some of the lymph nodes may also be removed. · Surgery to remove the breast. This includes: ¨ Total mastectomy. This removes the whole breast. 
¨ Nipple-sparing mastectomy. This removes the whole breast but leaves the nipple. ¨ Modified radical mastectomy. This removes the whole breast and the lymph nodes under the arm (axillary lymph nodes). ¨ Radical mastectomy. This removes the whole breast, the chest muscles, and all the lymph nodes under the arm. If lymph nodes under the arm are removed, they are looked at under the microscope to check for cancer. There are two types of lymph node removal: · [place] lymph node biopsy removes the lymph node that is the first to receive lymph fluid from the tumor. If cancer has spread from the breast to the lymph nodes, cancer cells most often show up in the sentinel node first. 
· Axillary lymph node dissection removes most of the lymph nodes in the armpit. The type of surgery you have depends on the size, location, and type of the cancer. It also depends on your overall health and personal preferences. Even if your doctor removes all the cancer that can be seen at the time of your surgery, you may still need more treatment. You may get radiation, chemotherapy, or hormone therapy after surgery to try to kill any cancer cells that may be left. No matter what kind of surgery you have, you will get information about why you are having it, what its risks are, how to prepare, and what to do after surgery. Follow-up care is a key part of your treatment and safety. Be sure to make and go to all appointments, and call your doctor if you are having problems. It's also a good idea to know your test results and keep a list of the medicines you take. Where can you learn more? Go to http://charles-kristin.info/. Enter X810 in the search box to learn more about \"Learning About Breast Cancer Treatments. \" 
 Current as of: May 12, 2017 Content Version: 11.4 © 8513-0447 [employer]. Care instructions adapted under license by [employer] (which disclaims liability or warranty for this information). If you have questions about a medical condition or this instruction, always ask your healthcare professional. [address] any warranty or liability for your use of this information. Learning About Tests When You Have Diabetes Why do you need regular diabetes tests? Diabetes can be hard on your body if it's not well controlled. But having tests on a regular schedule can help you and your doctor find problems early, when it's easier to start managing them. What tests do you need? The tests you may have, how often you should have them, and the goals of the tests are: 
A1c blood test. This test shows the average level of blood sugar over the past 2 to 3 months. It helps your doctor see whether blood sugar levels have been staying within your target range. · How often: Every 3 to 6 months · Goal: A blood sugar level in your target range Blood pressure test: This test measures the pressure of blood flow in the arteries. Controlling blood pressure can help prevent damage to nerves and blood vessels. · How often: Every 3 to 6 months · Goal: A blood pressure level in your target range Cholesterol test: This test measures the amount of a type of fat in the blood. It is common for people with diabetes to also have high cholesterol. Too much cholesterol in the blood can build up inside the blood vessels and raise the risk for heart attack and stroke. · How often: At the time of your diabetes diagnosis, and as often as your doctor recommends after that · Goal: A cholesterol level in your target range Albumin-creatinine ratio test: This test checks for kidney damage by looking for the protein albumin (say \"al-BYOO-carissa\") in the urine.  Albumin is normally found in the blood. Kidney damage can let small amounts of it (microalbumin) leak into the urine. · How often: Once a year · Goal: No protein in the urine Blood creatinine test/estimated glomerular filtration (eGFR): The blood creatinine (say \"fhxy-NO-il-neen\") level shows how well your kidneys are working. Creatinine is a waste product that muscles release into the blood. Blood creatinine is used to estimate the glomerular filtration rate. A high level of creatinine and/or a low eGFR may mean your kidneys are not working as well as they should. · How often: Once a year · Goal: Normal level of creatinine in the blood. The eGFR goal is greater than 60 mL/min/1.73 m². Complete foot exam: The doctor checks for foot sores and whether any sensation has been lost. 
· How often: Once a year · Goal: Healthy feet with no foot ulcers or loss of feeling Dental exam and cleaning: The dentist checks for gum disease and tooth decay. People with high blood sugar are more likely to have these problems. · How often: Every 6 months · Goal: Healthy teeth and gums Complete eye exam: High blood sugar levels can damage the eyes. This exam is done by an ophthalmologist or optometrist. It includes a dilated eye exam. The exam shows whether there's damage to the back of the eye (diabetic retinopathy). · How often: Once a year. If you don't have any signs of diabetic retinopathy, your doctor may recommend an exam every 2 years. · Goal: No damage to the back of the eye Thyroid-stimulating hormone (TSH) blood test: This test checks for thyroid disease. Too little thyroid hormone can cause some medicines (like insulin) to stay in the body longer. This can cause low blood sugar. You may be tested if you have high cholesterol or are a woman over 48years old. · How often: As part of your diabetes diagnosis, and as often as your doctor recommends after that · Goal: Normal level of TSH in the blood Follow-up care is a key part of your treatment and safety. Be sure to make and go to all appointments, and call your doctor if you are having problems. It's also a good idea to know your test results and keep a list of the medicines you take. Where can you learn more? Go to http://charles-kristin.info/. Enter [id number] in the search box to learn more about \"Learning About Tests When You Have Diabetes. \" Current as of: March 13, 2017 Content Version: 11.4 © 8729-0706 [employer]. Care instructions adapted under license by [employer] (which disclaims liability or warranty for this information). If you have questions about a medical condition or this instruction, always ask your healthcare professional. [address] any warranty or liability for your use of this information. Nutrition Tips for Diabetes: After Your Visit Your Care Instructions A healthy diet is important to manage diabetes. It helps you lose weight (if you need to) and keep it off. It gives you the nutrition and energy your body needs and helps prevent heart disease. But a diet for diabetes does not mean that you have to eat special foods. You can eat what your family eats, including occasional sweets and other favorites. But you do have to pay attention to how often you eat and how much you eat of certain foods. The right plan for you will give you meals that help you keep your blood sugar at healthy levels. Try to eat a variety of foods and to spread carbohydrate throughout the day. Carbohydrate raises blood sugar higher and more quickly than any other nutrient does. Carbohydrate is found in sugar, breads and cereals, fruit, starchy vegetables such as potatoes and corn, and milk and yogurt. You may want to work with a dietitian or diabetes educator to help you plan meals and snacks.  A dietitian or diabetes educator also can help you lose weight if that is one of your goals. The following tips can help you enjoy your meals and stay healthy. Follow-up care is a key part of your treatment and safety. Be sure to make and go to all appointments, and call your doctor if you are having problems. Its also a good idea to know your test results and keep a list of the medicines you take. How can you care for yourself at home? · Learn which foods have carbohydrate and how much carbohydrate to eat. A dietitian or diabetes educator can help you learn to keep track of how much carbohydrate you eat. · Spread carbohydrate throughout the day. Eat some carbohydrate at all meals, but do not eat too much at any one time. · Plan meals to include food from all the food groups. These are the food groups and some example portion sizes: ¨ Grains: 1 slice of bread (1 ounce), ½ cup of cooked cereal, and 1/3 cup of cooked pasta or rice. These have about 15 grams of carbohydrate in a serving. Choose whole grains such as whole wheat bread or crackers, oatmeal, and brown rice more often than refined grains. ¨ Fruit: 1 small fresh fruit, such as an apple or orange; ½ of a banana; ½ cup of chopped, cooked, or canned fruit; ½ cup of fruit juice; 1 cup of melon or raspberries; and 2 tablespoons of dried fruit. These have about 15 grams of carbohydrate in a serving. ¨ Dairy: 1 cup of nonfat or low-fat milk and 2/3 cup of plain yogurt. These have about 15 grams of carbohydrate in a serving. ¨ Protein foods: Beef, chicken, turkey, fish, eggs, tofu, cheese, cottage cheese, and peanut butter. A serving size of meat is 3 ounces, which is about the size of a deck of cards. Examples of meat substitute serving sizes (equal to 1 ounce of meat) are 1/4 cup of cottage cheese, 1 egg, 1 tablespoon of peanut butter, and ½ cup of tofu. These have very little or no carbohydrate per serving.  
¨ Vegetables: Starchy vegetables such as ½ cup of cooked dried beans, peas, potatoes, or corn have about 15 grams of carbohydrate. Nonstarchy vegetables have very little carbohydrate, such as 1 cup of raw leafy vegetables (such as spinach), ½ cup of other vegetables (cooked or chopped), and 3/4 cup of vegetable juice. · Use the plate format to plan meals. It is a good, quick way to make sure that you have a balanced meal. It also helps you spread carbohydrate throughout the day. You divide your plate by types of foods. Put vegetables on half the plate, meat or meat substitutes on one-quarter of the plate, and a grain or starchy vegetable (such as brown rice or a potato) in the final quarter of the plate. To this you can add a small piece of fruit and 1 cup of milk or yogurt, depending on how much carbohydrate you are supposed to eat at a meal. 
· Talk to your dietitian or diabetes educator about ways to add limited amounts of sweets into your meal plan. You can eat these foods now and then, as long as you include the amount of carbohydrate they have in your daily carbohydrate allowance. · If you drink alcohol, limit it to no more than 1 drink a day for women and 2 drinks a day for men. If you are pregnant, no amount of alcohol is known to be safe. · Protein, fat, and fiber do not raise blood sugar as much as carbohydrate does. If you eat a lot of these nutrients in a meal, your blood sugar will rise more slowly than it would otherwise. · Limit saturated fats, such as those from meat and dairy products. Try to replace it with monounsaturated fat, such as olive oil. This is a healthier choice because people who have diabetes are at higher-than-average risk of heart disease. But use a modest amount of olive oil. A tablespoon of olive oil has 14 grams of fat and 120 calories. · Exercise lowers blood sugar. If you take insulin by shots or pump, you can use less than you would if you were not exercising.  Keep in mind that timing matters. If you exercise within 1 hour after a meal, your body may need less insulin for that meal than it would if you exercised 3 hours after the meal. Test your blood sugar to find out how exercise affects your need for insulin. · Exercise on most days of the week. Aim for at least 30 minutes. Exercise helps you stay at a healthy weight and helps your body use insulin. Walking is an easy way to get exercise. Gradually increase the amount you walk every day. You also may want to swim, bike, or do other activities. When you eat out · Learn to estimate the serving sizes of foods that have carbohydrate. If you measure food at home, it will be easier to estimate the amount in a serving of restaurant food. · If the meal you order has too much carbohydrate (such as potatoes, corn, or baked beans), ask to have a low-carbohydrate food instead. Ask for a salad or green vegetables. · If you use insulin, check your blood sugar before and after eating out to help you plan how much to eat in the future. · If you eat more carbohydrate at a meal than you had planned, take a walk or do other exercise. This will help lower your blood sugar. Where can you learn more? Go to Wil[employer].be Enter P670 in the search box to learn more about \"Nutrition Tips for Diabetes: After Your Visit. \"  
© 9807-6833 [employer]wise[employer]. Care instructions adapted under license by Shannan Mcwilliams (which disclaims liability or warranty for this information). This care instruction is for use with your licensed healthcare professional. If you have questions about a medical condition or this instruction, always ask your healthcare professional. [place] [address] any warranty or liability for your use of this information. Content Version: 62.4.869978; Current as of: June 4, 2014 Introducing [facility]! Dear Oneida Aguila: Thank you for requesting a [email or web address] account. Our records indicate that you have previously registered for a [email or web address] account but its currently inactive. Please call our [email or web address] support line at [phone]. Additional Information If you have questions, please visit the Frequently Asked Questions section of the [email or web address] website at [email or web address] Remember, [email or web address] is NOT to be used for urgent needs. For medical emergencies, dial 911. Now available from your iPhone and Android! Please provide this summary of care documentation to your next provider. Your primary care clinician is listed as Quentin Jeong. If you have any questions after today's visit, please call [phone].

## 2018-04-02 NOTE — PROGRESS NOTES
Chief Complaint   Patient presents with    Follow-up     diabetes     1. Have you been to the ER, urgent care clinic since your last visit? Hospitalized since your last visit? No    2. Have you seen or consulted any other health care providers outside of the [address] [place] [address] since your last visit? Include any pap smears or colon screening.  No    Left leg bothering her at night    Eye exam--1 year ago-normal    Colonoscopy--needs a referral--wants to get it done

## 2018-04-02 NOTE — PATIENT INSTRUCTIONS
Breast Cancer: Care Instructions  Your Care Instructions    Breast cancer occurs when abnormal cells grow out of control in the breast. These cancer cells can spread within the breast, to nearby lymph nodes and other tissues, and to other parts of the body. Being treated for cancer can weaken your body, and you may feel very tired. Get the rest your body needs so you can feel better. Finding out that you have cancer is scary. You may feel many emotions and may need some help coping. Seek out family, friends, and counselors for support. You also can do things at home to make yourself feel better while you go through treatment. Call the [facility] ([phone]) or visit its website at [employer] for more information. Follow-up care is a key part of your treatment and safety. Be sure to make and go to all appointments, and call your doctor if you are having problems. It's also a good idea to know your test results and keep a list of the medicines you take. How can you care for yourself at home? · Take your medicines exactly as prescribed. Call your doctor if you think you are having a problem with your medicine. You may get medicine for nausea and vomiting if you have these side effects. · Follow your doctor's instructions to relieve pain. Pain from cancer and surgery can almost always be controlled. Use pain medicine when you first notice pain, before it becomes severe. · Eat healthy food. If you do not feel like eating, try to eat food that has protein and extra calories to keep up your strength and prevent weight loss. Drink liquid meal replacements for extra calories and protein. Try to eat your main meal early. · Get some physical activity every day, but do not get too tired. Keep doing the hobbies you enjoy as your energy allows. · Do not smoke. Smoking can make your cancer worse. If you need help quitting, talk to your doctor about stop-smoking programs and medicines.  These can increase your chances of quitting for good. · Take steps to control your stress and workload. Learn relaxation techniques. ¨ Share your feelings. Stress and tension affect our emotions. By expressing your feelings to others, you may be able to understand and cope with them. ¨ Consider joining a support group. Talking about a problem with your spouse, a good friend, or other people with similar problems is a good way to reduce tension and stress. ¨ Express yourself through art. Try writing, crafts, dance, or art to relieve stress. Some dance, writing, or art groups may be available just for people who have cancer. ¨ Be kind to your body and mind. Getting enough sleep, eating a healthy diet, and taking time to do things you enjoy can contribute to an overall feeling of balance in your life and can help reduce stress. ¨ Get help if you need it. Discuss your concerns with your doctor or counselor. · If you are vomiting or have diarrhea:  ¨ Drink plenty of fluids (enough so that your urine is light yellow or clear like water) to prevent dehydration. Choose water and other caffeine-free clear liquids. If you have kidney, heart, or liver disease and have to limit fluids, talk with your doctor before you increase the amount of fluids you drink. ¨ When you are able to eat, try clear soups, mild foods, and liquids until all symptoms are gone for 12 to 48 hours. Other good choices include dry toast, crackers, cooked cereal, and gelatin dessert, such as Jell-O.  · If you have not already done so, prepare a list of advance directives. Advance directives are instructions to your doctor and family members about what kind of care you want if you become unable to speak or express yourself. When should you call for help? Call 911 anytime you think you may need emergency care. For example, call if:  ? · You passed out (lost consciousness). ?Call your doctor now or seek immediate medical care if:  ? · You have a fever. ? · You have abnormal bleeding. ? · You think you have an infection. ? · You have new or worse pain. ? · You have new symptoms, such as a cough, belly pain, vomiting, diarrhea, or a rash. ? Watch closely for changes in your health, and be sure to contact your doctor if:  ? · You are much more tired than usual.   ? · You have swollen glands in your armpits, groin, or neck. ? · You do not get better as expected. Where can you learn more? Go to http://charles-kristin.info/. Enter V321 in the search box to learn more about \"Breast Cancer: Care Instructions. \"  Current as of: May 12, 2017  Content Version: 11.4  © 5902-7626 [employer]. Care instructions adapted under license by [employer] (which disclaims liability or warranty for this information). If you have questions about a medical condition or this instruction, always ask your healthcare professional. [place] [address] any warranty or liability for your use of this information. Learning About Breast Cancer Treatments  Your Care Instructions    Breast cancer means abnormal cells grow out of control in one or both breasts. These cancer cells can spread from the breast to nearby lymph nodes and other tissues. They can also spread to other parts of the body. The type and stage of cancer you have is based on:  · Where in the breast the cancer started. · The genetics of those cancer cells. · How far cancer has spread within the breast, to nearby tissues, or to other organs. · What the cancer cells look like under a microscope. · Whether there is cancer in the nearby lymph nodes. Tests that help find the stage of your cancer can help choose the right treatment for you. These tests can include a breast biopsy, lymph node biopsy, blood tests, and X-rays. You may need other tests as well, such as a bone, CT, or MRI scan.  Whether you have more tests depends on your symptoms and the stage of the cancer. When you find out that you have cancer, you may feel many emotions and may need some help coping. Seek out family, friends, and counselors for support. You also can do things at home to make yourself feel better while you go through treatment. Call the [facility] ([phone]) or visit its website at [address] [employer] for more information. How is breast cancer treated? Your doctor may combine treatments. This is a common way to treat breast cancer. Treatment depends on what type and stage of cancer you have. You may have:  · Surgery to remove the cancer. · Radiation. This uses high-dose X-rays to kill cancer cells and shrink tumors. · Chemotherapy. This uses medicine to kill cancer cells. · Hormone therapy. This uses medicines such as tamoxifen. It limits the effect of the hormone estrogen. This hormone can help some types of breast cancer cells to grow. · Biological therapy. This uses medicines such as trastuzumab (Herceptin) to help your immune system fight the cancer. What surgeries are done for breast cancer? Surgery is a key part of treatment for breast cancer. The main types of surgeries are:  · Breast-conserving surgery. This is surgery that does not remove the whole breast. This includes:  ¨ Lumpectomy. This surgery removes the cancer in the breast and some of the normal tissue around it. ¨ Partial mastectomy. This surgery removes part of the breast. The lining of the chest muscles below the cancer and some of the lymph nodes may also be removed. · Surgery to remove the breast. This includes:  ¨ Total mastectomy. This removes the whole breast.  ¨ Nipple-sparing mastectomy. This removes the whole breast but leaves the nipple. ¨ Modified radical mastectomy. This removes the whole breast and the lymph nodes under the arm (axillary lymph nodes). ¨ Radical mastectomy. This removes the whole breast, the chest muscles, and all the lymph nodes under the arm.   If lymph nodes under the arm are removed, they are looked at under the microscope to check for cancer. There are two types of lymph node removal:  · [place] lymph node biopsy removes the lymph node that is the first to receive lymph fluid from the tumor. If cancer has spread from the breast to the lymph nodes, cancer cells most often show up in the sentinel node first.  · Axillary lymph node dissection removes most of the lymph nodes in the armpit. The type of surgery you have depends on the size, location, and type of the cancer. It also depends on your overall health and personal preferences. Even if your doctor removes all the cancer that can be seen at the time of your surgery, you may still need more treatment. You may get radiation, chemotherapy, or hormone therapy after surgery to try to kill any cancer cells that may be left. No matter what kind of surgery you have, you will get information about why you are having it, what its risks are, how to prepare, and what to do after surgery. Follow-up care is a key part of your treatment and safety. Be sure to make and go to all appointments, and call your doctor if you are having problems. It's also a good idea to know your test results and keep a list of the medicines you take. Where can you learn more? Go to http://charles-kristin.info/. Enter X810 in the search box to learn more about \"Learning About Breast Cancer Treatments. \"  Current as of: May 12, 2017  Content Version: 11.4  © 0526-7788 [employer]. Care instructions adapted under license by [employer]. (which disclaims liability or warranty for this information). If you have questions about a medical condition or this instruction, always ask your healthcare professional. [address] any warranty or liability for your use of this information. Learning About Tests When You Have Diabetes  Why do you need regular diabetes tests?     Diabetes can be hard on your body if it's not well controlled. But having tests on a regular schedule can help you and your doctor find problems early, when it's easier to start managing them. What tests do you need? The tests you may have, how often you should have them, and the goals of the tests are:  A1c blood test. This test shows the average level of blood sugar over the past 2 to 3 months. It helps your doctor see whether blood sugar levels have been staying within your target range. · How often: Every 3 to 6 months  · Goal: A blood sugar level in your target range  Blood pressure test: This test measures the pressure of blood flow in the arteries. Controlling blood pressure can help prevent damage to nerves and blood vessels. · How often: Every 3 to 6 months  · Goal: A blood pressure level in your target range  Cholesterol test: This test measures the amount of a type of fat in the blood. It is common for people with diabetes to also have high cholesterol. Too much cholesterol in the blood can build up inside the blood vessels and raise the risk for heart attack and stroke. · How often: At the time of your diabetes diagnosis, and as often as your doctor recommends after that  · Goal: A cholesterol level in your target range  Albumin-creatinine ratio test: This test checks for kidney damage by looking for the protein albumin (say \"al-BYOO-carissa\") in the urine. Albumin is normally found in the blood. Kidney damage can let small amounts of it (microalbumin) leak into the urine. · How often: Once a year  · Goal: No protein in the urine  Blood creatinine test/estimated glomerular filtration (eGFR): The blood creatinine (say \"asds-JB-gm-neen\") level shows how well your kidneys are working. Creatinine is a waste product that muscles release into the blood. Blood creatinine is used to estimate the glomerular filtration rate.  A high level of creatinine and/or a low eGFR may mean your kidneys are not working as well as they should. · How often: Once a year  · Goal: Normal level of creatinine in the blood. The eGFR goal is greater than 60 mL/min/1.73 m². Complete foot exam: The doctor checks for foot sores and whether any sensation has been lost.  · How often: Once a year  · Goal: Healthy feet with no foot ulcers or loss of feeling  Dental exam and cleaning: The dentist checks for gum disease and tooth decay. People with high blood sugar are more likely to have these problems. · How often: Every 6 months  · Goal: Healthy teeth and gums  Complete eye exam: High blood sugar levels can damage the eyes. This exam is done by an ophthalmologist or optometrist. It includes a dilated eye exam. The exam shows whether there's damage to the back of the eye (diabetic retinopathy). · How often: Once a year. If you don't have any signs of diabetic retinopathy, your doctor may recommend an exam every 2 years. · Goal: No damage to the back of the eye  Thyroid-stimulating hormone (TSH) blood test: This test checks for thyroid disease. Too little thyroid hormone can cause some medicines (like insulin) to stay in the body longer. This can cause low blood sugar. You may be tested if you have high cholesterol or are a woman over 48years old. · How often: As part of your diabetes diagnosis, and as often as your doctor recommends after that  · Goal: Normal level of TSH in the blood  Follow-up care is a key part of your treatment and safety. Be sure to make and go to all appointments, and call your doctor if you are having problems. It's also a good idea to know your test results and keep a list of the medicines you take. Where can you learn more? Go to http://charles-kristin.info/. Enter [id number] in the search box to learn more about \"Learning About Tests When You Have Diabetes. \"  Current as of: March 13, 2017  Content Version: 11.4  © 2148-1955 Healthwise, Incorporated.  Care instructions adapted under license by [employer] (which disclaims liability or warranty for this information). If you have questions about a medical condition or this instruction, always ask your healthcare professional. [address] any warranty or liability for your use of this information. Nutrition Tips for Diabetes: After Your Visit  Your Care Instructions  A healthy diet is important to manage diabetes. It helps you lose weight (if you need to) and keep it off. It gives you the nutrition and energy your body needs and helps prevent heart disease. But a diet for diabetes does not mean that you have to eat special foods. You can eat what your family eats, including occasional sweets and other favorites. But you do have to pay attention to how often you eat and how much you eat of certain foods. The right plan for you will give you meals that help you keep your blood sugar at healthy levels. Try to eat a variety of foods and to spread carbohydrate throughout the day. Carbohydrate raises blood sugar higher and more quickly than any other nutrient does. Carbohydrate is found in sugar, breads and cereals, fruit, starchy vegetables such as potatoes and corn, and milk and yogurt. You may want to work with a dietitian or diabetes educator to help you plan meals and snacks. A dietitian or diabetes educator also can help you lose weight if that is one of your goals. The following tips can help you enjoy your meals and stay healthy. Follow-up care is a key part of your treatment and safety. Be sure to make and go to all appointments, and call your doctor if you are having problems. Its also a good idea to know your test results and keep a list of the medicines you take. How can you care for yourself at home? · Learn which foods have carbohydrate and how much carbohydrate to eat. A dietitian or diabetes educator can help you learn to keep track of how much carbohydrate you eat. · Spread carbohydrate throughout the day.  Eat some carbohydrate at all meals, but do not eat too much at any one time. · Plan meals to include food from all the food groups. These are the food groups and some example portion sizes:  ¨ Grains: 1 slice of bread (1 ounce), ½ cup of cooked cereal, and 1/3 cup of cooked pasta or rice. These have about 15 grams of carbohydrate in a serving. Choose whole grains such as whole wheat bread or crackers, oatmeal, and brown rice more often than refined grains. ¨ Fruit: 1 small fresh fruit, such as an apple or orange; ½ of a banana; ½ cup of chopped, cooked, or canned fruit; ½ cup of fruit juice; 1 cup of melon or raspberries; and 2 tablespoons of dried fruit. These have about 15 grams of carbohydrate in a serving. ¨ Dairy: 1 cup of nonfat or low-fat milk and 2/3 cup of plain yogurt. These have about 15 grams of carbohydrate in a serving. ¨ Protein foods: Beef, chicken, turkey, fish, eggs, tofu, cheese, cottage cheese, and peanut butter. A serving size of meat is 3 ounces, which is about the size of a deck of cards. Examples of meat substitute serving sizes (equal to 1 ounce of meat) are 1/4 cup of cottage cheese, 1 egg, 1 tablespoon of peanut butter, and ½ cup of tofu. These have very little or no carbohydrate per serving. ¨ Vegetables: Starchy vegetables such as ½ cup of cooked dried beans, peas, potatoes, or corn have about 15 grams of carbohydrate. Nonstarchy vegetables have very little carbohydrate, such as 1 cup of raw leafy vegetables (such as spinach), ½ cup of other vegetables (cooked or chopped), and 3/4 cup of vegetable juice. · Use the plate format to plan meals. It is a good, quick way to make sure that you have a balanced meal. It also helps you spread carbohydrate throughout the day. You divide your plate by types of foods. Put vegetables on half the plate, meat or meat substitutes on one-quarter of the plate, and a grain or starchy vegetable (such as brown rice or a potato) in the final quarter of the plate. To this you can add a small piece of fruit and 1 cup of milk or yogurt, depending on how much carbohydrate you are supposed to eat at a meal.  · Talk to your dietitian or diabetes educator about ways to add limited amounts of sweets into your meal plan. You can eat these foods now and then, as long as you include the amount of carbohydrate they have in your daily carbohydrate allowance. · If you drink alcohol, limit it to no more than 1 drink a day for women and 2 drinks a day for men. If you are pregnant, no amount of alcohol is known to be safe. · Protein, fat, and fiber do not raise blood sugar as much as carbohydrate does. If you eat a lot of these nutrients in a meal, your blood sugar will rise more slowly than it would otherwise. · Limit saturated fats, such as those from meat and dairy products. Try to replace it with monounsaturated fat, such as olive oil. This is a healthier choice because people who have diabetes are at higher-than-average risk of heart disease. But use a modest amount of olive oil. A tablespoon of olive oil has 14 grams of fat and 120 calories. · Exercise lowers blood sugar. If you take insulin by shots or pump, you can use less than you would if you were not exercising. Keep in mind that timing matters. If you exercise within 1 hour after a meal, your body may need less insulin for that meal than it would if you exercised 3 hours after the meal. Test your blood sugar to find out how exercise affects your need for insulin. · Exercise on most days of the week. Aim for at least 30 minutes. Exercise helps you stay at a healthy weight and helps your body use insulin. Walking is an easy way to get exercise. Gradually increase the amount you walk every day. You also may want to swim, bike, or do other activities. When you eat out  · Learn to estimate the serving sizes of foods that have carbohydrate.  If you measure food at home, it will be easier to estimate the amount in a serving of restaurant food. · If the meal you order has too much carbohydrate (such as potatoes, corn, or baked beans), ask to have a low-carbohydrate food instead. Ask for a salad or green vegetables. · If you use insulin, check your blood sugar before and after eating out to help you plan how much to eat in the future. · If you eat more carbohydrate at a meal than you had planned, take a walk or do other exercise. This will help lower your blood sugar. Where can you learn more? Go to [facility]  Enter O097 in the search box to learn more about \"Nutrition Tips for Diabetes: After Your Visit. \"   © 0495-1703 Healthwise, Incorporated. Care instructions adapted under license by Mimi Bautista (which disclaims liability or warranty for this information). This care instruction is for use with your licensed healthcare professional. If you have questions about a medical condition or this instruction, always ask your healthcare professional. [place] [address] any warranty or liability for your use of this information.   Content Version: 33.4.541186; Current as of: June 4, 2014

## 2018-04-03 LAB
CHOLEST SERPL-MCNC: 167 MG/DL (ref 100–199)
ERYTHROCYTE [DISTWIDTH] IN BLOOD BY AUTOMATED COUNT: 13.4 % (ref 12.3–15.4)
EST. AVERAGE GLUCOSE BLD GHB EST-MCNC: 131 MG/DL
HBA1C MFR BLD: 6.2 % (ref 4.8–5.6)
HCT VFR BLD AUTO: 40.7 % (ref 34–46.6)
HDLC SERPL-MCNC: 73 MG/DL
HGB BLD-MCNC: 13.1 G/DL (ref 11.1–15.9)
INTERPRETATION, 910389: NORMAL
LDLC SERPL CALC-MCNC: 73 MG/DL (ref 0–99)
Lab: NORMAL
MCH RBC QN AUTO: 28.9 PG (ref 26.6–33)
MCHC RBC AUTO-ENTMCNC: 32.2 G/DL (ref 31.5–35.7)
MCV RBC AUTO: 90 FL (ref 79–97)
PLATELET # BLD AUTO: 251 X10E3/UL (ref 150–379)
RBC # BLD AUTO: 4.53 X10E6/UL (ref 3.77–5.28)
TRIGL SERPL-MCNC: 106 MG/DL (ref 0–149)
VLDLC SERPL CALC-MCNC: 21 MG/DL (ref 5–40)
WBC # BLD AUTO: 6.7 X10E3/UL (ref 3.4–10.8)

## 2018-04-25 NOTE — PROGRESS NOTES
Ihsan Ventura  79 y.o. female  1950  [address]  [facility] [address]  [id number]   [address]: Progress Note  Di Burrell MD       Encounter Date: 2018    Chief Complaint   Patient presents with    Follow-up     diabetes     History of Present Illness   Ihsan Ventura is a 79 y.o. female who presents to clinic today for follow-up on DM and HTN and discussion about breast cancer. Diabetes Mellitus:  She has diabetes mellitus, and  hypertension. Diabetic ROS - medication compliance: compliant all of the time, diabetic diet compliance: compliant most of the time, home glucose monitoring: is not performed, further diabetic ROS: no polyuria or polydipsia, no chest pain, dyspnea or TIA's, no numbness, tingling or pain in extremities. Lab review: orders written for new lab studies as appropriate; see orders    Hypertension  Patient is here for follow-up of hypertension. She indicates that she is feeling well and denies any symptoms referable to her hypertension. She is not exercising and is adherent to low salt diet. Blood pressure is well controlled at home. Use of agents associated with hypertension: none. Breast Cancer  Discussed diagnosis made over 1 year ago. Patient never followed up with her breast surgeon. She notes dealing with multiple deaths, including the death of her . He did not know before he  and she has not told anyone else about her diagnosis either. She notes she knows she needs to go see the surgeon and start taking care of herself, but sees herself as the care giver for everyone else. She also notes she is an [employer]  and her job makes it difficult to schedule an appointment. She states she was told she needed to go to a \"class\" for an hour and a half before her appointment. She didn't think she could do this. Review of Systems   Review of Systems   Constitutional: Negative for chills and fever.    HENT: Negative for congestion and sore throat. Eyes: Negative for blurred vision and double vision. Respiratory: Negative for cough, shortness of breath and wheezing. Cardiovascular: Negative for chest pain and palpitations. Gastrointestinal: Negative for abdominal pain, constipation, diarrhea and nausea. Genitourinary: Negative for dysuria and hematuria. Musculoskeletal: Negative for back pain, falls and myalgias. Skin: Negative for rash. Neurological: Negative for dizziness, tremors and headaches. Psychiatric/Behavioral: Negative for depression. The patient is not nervous/anxious. All other systems reviewed and are negative. Vitals/Objective:     Vitals:    04/02/18 1320   BP: 132/76   Pulse: 80   Resp: 17   Temp: 98.7 °F (37.1 °C)   TempSrc: Oral   SpO2: 99%   Weight: 146 lb (66.2 kg)   Height: 5' 3\" (1.6 m)     Body mass index is 25.86 kg/(m^2). Physical Exam   Constitutional: She appears well-nourished. No distress. Eyes: Conjunctivae are normal.   Cardiovascular: Normal rate, regular rhythm, normal heart sounds and intact distal pulses. Exam reveals no gallop and no friction rub. No murmur heard. Pulmonary/Chest: Effort normal and breath sounds normal. She has no wheezes. She has no rales. Abdominal: Soft. Bowel sounds are normal. She exhibits no distension. There is no tenderness. Musculoskeletal: Normal range of motion. Lymphadenopathy:     She has no cervical adenopathy. Neurological: She is alert. She has normal reflexes. She displays normal reflexes. She exhibits normal muscle tone. Coordination normal.   Skin: No rash noted. Vitals reviewed. Ref. Range 4/2/2018 14:36   Microalbumin/creat ratio (POC) Latest Ref Range: <30 MG/G <30     Assessment and Plan:   1. Controlled type 2 diabetes mellitus without complication, without long-term current use of insulin ([address]  Will continue with metformin at this time. Will get annual labs.     - CBC W/O DIFF  - LIPID PANEL  - HEMOGLOBIN A1C WITH EAG  - AMB POC URINE, MICROALBUMIN, SEMIQUANT (3 RESULTS)  - metFORMIN (GLUCOPHAGE) 500 mg tablet; TAKE 1 TAB BY MOUTH TWO (2) TIMES DAILY (WITH MEALS). Dispense: 180 Tab; Refill: 3    2. Essential hypertension  Controlled. Continue Benicar HCT. Get labs. - CBC W/O DIFF  - olmesartan-hydroCHLOROthiazide (BENICAR HCT) 40-12.5 mg per tablet; Take 1 Tab by mouth daily. Dispense: 90 Tab; Refill: 3    3. Malignant neoplasm of left female breast, unspecified estrogen receptor status, unspecified site of breast [facility])  Long discussion with Ms Mook Spann regarding her diagnosis of breast cancer and not seeking medical care. Discussed risk of progression, including metastasis and death. She has underlying psychosocial barriers, including grief surround her husbands death and denial of her diagnosis. She has yet to tell any of her family about her diagnosis and therefore has no social support around her diagnosis either. She does realize it is important that she be evaluated and we discussed her barriers to care. She has agreed that she will see the breast surgeon. Another referral has been placed. We will be sure to follow-up closely as I fear she will continue to delay treatment until it may be too late. [facility] Breast [facility]      I have discussed the diagnosis with the patient and the intended plan as seen in the above orders. she has expressed understanding. The patient has received an after-visit summary and questions were answered concerning future plans. I have discussed medication side effects and warnings with the patient as well. Follow-up Disposition:  Return in about 6 months (around 10/2/2018). Electronically Signed: Bin Butts MD     History   Patients past medical, surgical and family histories were reviewed and updated.     Past Medical History:   Diagnosis Date    Diabetes ([facility]     Gallstone     Hypertension     Invasive carcinoma of breast ([address] 1/31/2017     Past Surgical History:   Procedure Laterality Date    HX GYN  1984    hysterectomy - Fibroids     Family History   Problem Relation Age of Onset    Bleeding Prob Mother      Anyerism- Brain    Diabetes Father     Other Father      hep C    Stroke Sister     No Known Problems Brother     No Known Problems Brother     No Known Problems Brother     No Known Problems Brother     No Known Problems Brother     Lupus Sister     No Known Problems Sister     No Known Problems Sister     No Known Problems Sister     Migraines Daughter     No Known Problems Daughter      Social History     Social History    Marital status:      Spouse name: N/A    Number of children: N/A    Years of education: N/A     Occupational History    Not on file. Social History Main Topics    Smoking status: Never Smoker    Smokeless tobacco: Never Used    Alcohol use No    Drug use: No    Sexual activity: Not Currently     Birth control/ protection: Surgical     Other Topics Concern    Not on file     Social History Narrative            Current Medications/Allergies     Current Outpatient Prescriptions   Medication Sig Dispense Refill    metFORMIN (GLUCOPHAGE) 500 mg tablet TAKE 1 TAB BY MOUTH TWO (2) TIMES DAILY (WITH MEALS). 180 Tab 3    olmesartan-hydroCHLOROthiazide (BENICAR HCT) 40-12.5 mg per tablet Take 1 Tab by mouth daily. 90 Tab 3    fluticasone (FLONASE) 50 mcg/actuation nasal spray 2 Sprays by Both Nostrils route daily. 1 Bottle 0    loratadine (CLARITIN) 10 mg tablet Take 1 Tab by mouth daily. 30 Tab 1    alendronate (FOSAMAX) 35 mg tablet Take 1 Tab by mouth every seven (7) days.  13 Tab 3     Allergies   Allergen Reactions    Penicillins Rash and Swelling

## 2018-05-14 ENCOUNTER — TELEPHONE (OUTPATIENT)
Dept: FAMILY MEDICINE CLINIC | Age: 68
End: 2018-05-14

## 2018-05-14 NOTE — TELEPHONE ENCOUNTER
Called to follow-up with patient. Known Left breast cancer and has missed/rescheduled/declined follow-up with the breast surgeons. She has known about this diagnosis for over 1 year. Left message for patient to call back. Will continue to encourage her to see additional care.

## 2018-06-08 ENCOUNTER — TELEPHONE (OUTPATIENT)
Dept: FAMILY MEDICINE CLINIC | Age: 68
End: 2018-06-08

## 2018-07-06 ENCOUNTER — PATIENT OUTREACH (OUTPATIENT)
Dept: FAMILY MEDICINE CLINIC | Age: 68
End: 2018-07-06

## 2018-07-06 NOTE — TELEPHONE ENCOUNTER
I called and left a message for patient. I stated that we havent heard from her regarding connecting with a breast surgeon. I indicated that Dr Cachorro Willoughby wanted to follow up with her diagnosis and ensure she is getting treatment.

## 2018-07-06 NOTE — PROGRESS NOTES
Outreach made to this patient to refer her to [facility] Case Management. Unable to reach this patient at this time or connect her to the appropriate  with her health insurance [employer]. Will notify Dr. Cara Pierson that I am unable to reach this patient at this time.

## 2018-10-27 ENCOUNTER — OFFICE VISIT (OUTPATIENT)
Dept: FAMILY MEDICINE CLINIC | Age: 68
End: 2018-10-27

## 2018-10-27 VITALS
TEMPERATURE: 98.1 F | RESPIRATION RATE: 16 BRPM | HEIGHT: 63 IN | OXYGEN SATURATION: 96 % | WEIGHT: 149 LBS | HEART RATE: 83 BPM | DIASTOLIC BLOOD PRESSURE: 104 MMHG | BODY MASS INDEX: 26.4 KG/M2 | SYSTOLIC BLOOD PRESSURE: 164 MMHG

## 2018-10-27 DIAGNOSIS — J02.0 STREP PHARYNGITIS: Primary | ICD-10-CM

## 2018-10-27 DIAGNOSIS — J39.8 CONGESTION OF UPPER RESPIRATORY TRACT: ICD-10-CM

## 2018-10-27 DIAGNOSIS — J02.9 SORE THROAT: ICD-10-CM

## 2018-10-27 LAB
FLUAV+FLUBV AG NOSE QL IA.RAPID: NEGATIVE POS/NEG
FLUAV+FLUBV AG NOSE QL IA.RAPID: NEGATIVE POS/NEG
S PYO AG THROAT QL: POSITIVE
VALID INTERNAL CONTROL?: YES
VALID INTERNAL CONTROL?: YES

## 2018-10-27 RX ORDER — AZITHROMYCIN 250 MG/1
TABLET, FILM COATED ORAL
Qty: 6 TAB | Refills: 0 | Status: SHIPPED | OUTPATIENT
Start: 2018-10-27 | End: 2018-11-01

## 2018-10-27 NOTE — PROGRESS NOTES
HISTORY OF PRESENT ILLNESS Kody Vang is a 76 y.o. female. Sore Throat Associated symptoms include cough. Pertinent negatives include no shortness of breath. Nasal Congestion Pertinent negatives include no chest pain and no shortness of breath. This 76year old lady presents with a 1-2 day hx of sore throat. States her grandson was diagnosed with strep a few days ago. She also complains of nasal congestion and a mild non productive cough. Eating and drinking OK Review of Systems Constitutional: Negative for chills and fever. HENT: Positive for sore throat. Respiratory: Positive for cough and sputum production. Negative for shortness of breath. Cardiovascular: Negative for chest pain. Gastrointestinal: Negative for nausea. Musculoskeletal: Negative for myalgias. Physical Exam  
Constitutional: She appears well-developed and well-nourished. No distress. HENT:  
Right Ear: External ear normal.  
Left Ear: External ear normal.  
Nose: Nose normal.  
Slightly erythematous posterior pharynx Eyes: Pupils are equal, round, and reactive to light. Neck: Normal range of motion. Neck supple. Cardiovascular: Normal rate, regular rhythm and normal heart sounds. No murmur heard. Pulmonary/Chest: Effort normal and breath sounds normal. No respiratory distress. She has no wheezes. Abdominal: Soft. There is no tenderness. Skin: She is not diaphoretic. ASSESSMENT and PLAN 
  ICD-10-CM ICD-9-CM 1. Strep pharyngitis J02.0 034.0 2. Sore throat J02.9 462 AMB POC RAPID STREP A  
   AMB POC KHAI INFLUENZA A/B TEST 3. Congestion of upper respiratory tract J98.8 519.8   
start zithromax Precautions given Follow up if not better

## 2018-10-27 NOTE — PROGRESS NOTES
1. Have you been to the ER, urgent care clinic since your last visit? Hospitalized since your last visit? No 
 
2. Have you seen or consulted any other health care providers outside of the [address] [place] [address] since your last visit? Include any pap smears or colon screening. No 
 
Chief Complaint Patient presents with  Sore Throat  Nasal Congestion Patient states some chest congestion, states grandson had strep couple of weeks ago. Blood pressure (!) 164/104, pulse 83, temperature 98.1 °F (36.7 °C), temperature source Oral, resp. rate 16, height 5' 3\" (1.6 m), weight 149 lb (67.6 kg), SpO2 96 %.

## 2019-01-29 ENCOUNTER — OFFICE VISIT (OUTPATIENT)
Dept: FAMILY MEDICINE CLINIC | Age: 69
End: 2019-01-29

## 2019-01-29 VITALS
BODY MASS INDEX: 27.29 KG/M2 | WEIGHT: 154 LBS | TEMPERATURE: 98.1 F | SYSTOLIC BLOOD PRESSURE: 169 MMHG | HEART RATE: 72 BPM | DIASTOLIC BLOOD PRESSURE: 91 MMHG | OXYGEN SATURATION: 99 % | HEIGHT: 63 IN | RESPIRATION RATE: 17 BRPM

## 2019-01-29 DIAGNOSIS — I10 ESSENTIAL HYPERTENSION: ICD-10-CM

## 2019-01-29 DIAGNOSIS — M70.62 TROCHANTERIC BURSITIS, LEFT HIP: Primary | ICD-10-CM

## 2019-01-29 NOTE — PROGRESS NOTES
Chief Complaint   Patient presents with    Hip Pain     left side--2 weeks   :    HPI  Dick Truong is a 76 y.o. female who presents for an acute on chronic left hip pain on going for 2 weeks. the context: was moving some crate at work about 1 weeks pain, seems to have aggravated the pain  Pain is at the back of her left buttock Intensity 9/10 , sharp, with radiation to left knee, worsened by sitting and standing and relieved by OTC Arthritis cream. Associated symptoms are weakness or numbness. Saddle anesthesia. Patient has tried nothing else. Patient denies fever, chills  CP/ SOB/ N/V/ diarrhea. She did not take her BP meds this am. Admits to non compliannce. Allergies - reviewed: Allergies   Allergen Reactions    Penicillins Rash and Swelling       Meds - reviewed:   Current Outpatient Medications   Medication Sig Dispense Refill    metFORMIN (GLUCOPHAGE) 500 mg tablet TAKE 1 TAB BY MOUTH TWO (2) TIMES DAILY (WITH MEALS). 180 Tab 3    olmesartan-hydroCHLOROthiazide (BENICAR HCT) 40-12.5 mg per tablet Take 1 Tab by mouth daily. 90 Tab 3    fluticasone (FLONASE) 50 mcg/actuation nasal spray 2 Sprays by Both Nostrils route daily. 1 Bottle 0    loratadine (CLARITIN) 10 mg tablet Take 1 Tab by mouth daily. 30 Tab 1    alendronate (FOSAMAX) 35 mg tablet Take 1 Tab by mouth every seven (7) days.  15 Tab 3       PMH- reviewed:  Past Medical History:   Diagnosis Date    Diabetes ([facility]     Gallstone     Hypertension     Invasive carcinoma of breast ([facility] 1/31/2017       SH- reviewed:  Smoker:  Social History     Tobacco Use   Smoking Status Never Smoker   Smokeless Tobacco Never Used       ETOH- reviewed:   Social History     Substance and Sexual Activity   Alcohol Use No       FH- reviewed:   Family History   Problem Relation Age of Onset    Bleeding Prob Mother         Anyerism- Brain    Diabetes Father     Other Father         hep C    Stroke Sister     No Known Problems Brother     No Known Problems Brother     No Known Problems Brother     No Known Problems Brother     No Known Problems Brother     Lupus Sister     No Known Problems Sister     No Known Problems Sister     No Known Problems Sister    Banks Migraines Daughter     No Known Problems Daughter          ROS: Positive for Items in bold:   Constitutional: headache, fever, fatigue, weight loss or weight gain      Resp: cough or shortness of breath      GI: nausea, vomiting, constipation, diarrhea, blood in stool  : frequency, urgency, dysuria, hematuria   Neuro: dizziness, numbness, tingling  Psych: anxiety, depression, stress     Physical Exam:  Visit Vitals  BP (!) 169/91   Pulse 72   Temp 98.1 °F (36.7 °C) (Oral)   Resp 17   Ht 5' 3\" (1.6 m)   Wt 154 lb (69.9 kg)   SpO2 99%   BMI 27.28 kg/m²       Gen: No apparent distress. Alert and oriented and responds to all questions appropriately. Neck: Supple; no masses; no thyromegaly appreciated  Lungs: Respirations unlabored; clear to auscultation bilaterally  Cardio: Regular, rate, and rhythm without murmurs, gallops or rubs   Abdomen: Soft; nontender; nondistended; normoactive bowel sounds; no masses or organomegaly  MSK: gait is antalgic. Heel and toe walking limited due to pain. Normal back flexion and extension. No paraspinal or spinal tenderness. DTR in knees neg  Left trochanter tender to palpation. Gonzalo and Fadir negative. Neurologic: No focal neurologic deficits. Strength and sensation grossly intact. Ext: Well perfused. No edema. Skin: No obvious rashes.         Lab Results   Component Value Date/Time    Sodium 143 07/13/2017 11:46 AM    Potassium 4.1 07/13/2017 11:46 AM    Chloride 102 07/13/2017 11:46 AM    CO2 23 07/13/2017 11:46 AM    Glucose 103 (H) 07/13/2017 11:46 AM    BUN 12 07/13/2017 11:46 AM    Creatinine 0.74 07/13/2017 11:46 AM    BUN/Creatinine ratio 16 07/13/2017 11:46 AM    GFR est AA 98 07/13/2017 11:46 AM    GFR est non-AA 85 07/13/2017 11:46 AM    Calcium 9.9 07/13/2017 11:46 AM     Lab Results   Component Value Date/Time    Cholesterol, total 167 04/02/2018 02:15 PM    HDL Cholesterol 73 04/02/2018 02:15 PM    LDL, calculated 73 04/02/2018 02:15 PM    VLDL, calculated 21 04/02/2018 02:15 PM    Triglyceride 106 04/02/2018 02:15 PM     Lab Results   Component Value Date/Time    Hemoglobin A1c 6.2 (H) 04/02/2018 02:15 PM       I have personally reviewed the labs results        Assessment and Plan:   Hussein Molina is a 76 y.o. female who presents for acute left trochanteric bursitis. Recommended conservative measures. Antalgics. Topical nsaids. Heat/ cold pads and home exercise. If not improved in 2-4 weeks. We'll consider PT and steroid injection. Discussed compliance with BP meds. Advised to RTC in 2 weeks for BP check      ICD-10-CM ICD-9-CM    1. Trochanteric bursitis, left hip M70.62 726.5    2. Essential hypertension I10 401.9        Discussed diagnoses in detail with patient. Patient expressed understanding of and agreement to above plan. All questions and concerns addressed. Medication risks/benefits/side effects discussed with patient. Patient is counseled to return to the office and/or ED if symptoms do not improve as expected. Patient discussed with Dr. Melva Hawkins, Attending Physician. Armando Araujo MD  01/29/19    Family Medicine Resident

## 2019-01-29 NOTE — PROGRESS NOTES
Chief Complaint   Patient presents with    Hip Pain     left side--2 weeks     1. Have you been to the ER, urgent care clinic since your last visit? Hospitalized since your last visit? No    2. Have you seen or consulted any other health care providers outside of the [address] [place] [address] since your last visit? Include any pap smears or colon screening. No    Trying to move something at work and felt like she pulled something. Pain radiates down the leg.

## 2019-01-29 NOTE — PATIENT INSTRUCTIONS
Trochanteric Bursitis: Exercises  Your Care Instructions  Here are some examples of typical rehabilitation exercises for your condition. Start each exercise slowly. Ease off the exercise if you start to have pain. Your doctor or physical therapist will tell you when you can start these exercises and which ones will work best for you. How to do the exercises  Hamstring wall stretch    1. Lie on your back in a doorway, with your good leg through the open door. 2. Slide your affected leg up the wall to straighten your knee. You should feel a gentle stretch down the back of your leg. 1. Do not arch your back. 2. Do not bend either knee. 3. Keep one heel touching the floor and the other heel touching the wall. Do not point your toes. 3. Hold the stretch for at least 1 minute to begin. Then try to lengthen the time you hold the stretch to as long as 6 minutes. 4. Repeat 2 to 4 times. 5. If you do not have a place to do this exercise in a doorway, there is another way to do it:  6. Lie on your back, and bend the knee of your affected leg. 7. Loop a towel under the ball and toes of that foot, and hold the ends of the towel in your hands. 8. Straighten your knee, and slowly pull back on the towel. You should feel a gentle stretch down the back of your leg. 9. Hold the stretch for 15 to 30 seconds. Or even better, hold the stretch for 1 minute if you can. 10. Repeat 2 to 4 times. Straight-leg raises to the outside    1. Lie on your side, with your affected leg on top. 2. Tighten the front thigh muscles of your top leg to keep your knee straight. 3. Keep your hip and your leg straight in line with the rest of your body, and keep your knee pointing forward. Do not drop your hip back. 4. Lift your top leg straight up toward the ceiling, about 12 inches off the floor. Hold for about 6 seconds, then slowly lower your leg. 5. Repeat 8 to 12 times. Clamshell    1.  Lie on your side, with your affected leg on top and your head propped on a pillow. Keep your feet and knees together and your knees bent. 2. Raise your top knee, but keep your feet together. Do not let your hips roll back. Your legs should open up like a clamshell. 3. Hold for 6 seconds. 4. Slowly lower your knee back down. Rest for 10 seconds. 5. Repeat 8 to 12 times. Standing quadriceps stretch    1. If you are not steady on your feet, hold on to a chair, counter, or wall. You can also lie on your stomach or your side to do this exercise. 2. Bend the knee of the leg you want to stretch, and reach behind you to grab the front of your foot or ankle with the hand on the same side. For example, if you are stretching your right leg, use your right hand. 3. Keeping your knees next to each other, pull your foot toward your buttock until you feel a gentle stretch across the front of your hip and down the front of your thigh. Your knee should be pointed directly to the ground, and not out to the side. 4. Hold the stretch for 15 to 30 seconds. 5. Repeat 2 to 4 times. Piriformis stretch    1. Lie on your back with your legs straight. 2. Lift your affected leg and bend your knee. With your opposite hand, reach across your body, and then gently pull your knee toward your opposite shoulder. 3. Hold the stretch for 15 to 30 seconds. 4. Repeat 2 to 4 times. Double knee-to-chest    1. Lie on your back with your knees bent and your feet flat on the floor. You can put a small pillow under your head and neck if it is more comfortable. 2. Bring both knees to your chest.  3. Keep your lower back pressed to the floor. Hold for 15 to 30 seconds. 4. Relax, and lower your knees to the starting position. 5. Repeat 2 to 4 times. Follow-up care is a key part of your treatment and safety. Be sure to make and go to all appointments, and call your doctor if you are having problems.  It's also a good idea to know your test results and keep a list of the medicines you take. Where can you learn more? Go to http://charles-kristin.info/. Enter Z509 in the search box to learn more about \"Trochanteric Bursitis: Exercises. \"  Current as of: September 20, 2018  Content Version: 11.9  © 5887-4632 [employer]. Care instructions adapted under license by [employer] (which disclaims liability or warranty for this information). If you have questions about a medical condition or this instruction, always ask your healthcare professional. [address] any warranty or liability for your use of this information.

## 2019-03-25 ENCOUNTER — OFFICE VISIT (OUTPATIENT)
Dept: FAMILY MEDICINE CLINIC | Age: 69
End: 2019-03-25

## 2019-03-25 VITALS
HEART RATE: 75 BPM | RESPIRATION RATE: 16 BRPM | DIASTOLIC BLOOD PRESSURE: 87 MMHG | HEIGHT: 63 IN | WEIGHT: 147.8 LBS | BODY MASS INDEX: 26.19 KG/M2 | TEMPERATURE: 98 F | SYSTOLIC BLOOD PRESSURE: 150 MMHG

## 2019-03-25 DIAGNOSIS — E11.9 CONTROLLED TYPE 2 DIABETES MELLITUS WITHOUT COMPLICATION, WITHOUT LONG-TERM CURRENT USE OF INSULIN (HCC): ICD-10-CM

## 2019-03-25 DIAGNOSIS — R10.32 ABDOMINAL PAIN, LLQ: Primary | ICD-10-CM

## 2019-03-25 DIAGNOSIS — I10 ESSENTIAL HYPERTENSION: ICD-10-CM

## 2019-03-25 DIAGNOSIS — C50.912 MALIGNANT NEOPLASM OF LEFT FEMALE BREAST, UNSPECIFIED ESTROGEN RECEPTOR STATUS, UNSPECIFIED SITE OF BREAST (HCC): ICD-10-CM

## 2019-03-25 NOTE — PATIENT INSTRUCTIONS

## 2019-03-25 NOTE — LETTER
3/25/2019 3:55 PM 
 
RE:    Ana Enriquez  
[address] 
[address] [id number] Thank you for agreeing to see Remington Simon I am referring my patient to you for evaluation of a diagnosis of invasive mammary carcinoma with lobular features. Please see her pertinent patient information below. Patient failed to follow-up after diagnosis in January 2017. Multiple attempts have been made to get her into see oncology/breast surgery. Patient expressed interest in seeking care most recent PCP visit, and hoping to capture the moment to get her treatment. I have also placed an order for diagnostic mammogram as she is likely to need additional imaging to follow-up on progression of her cancer. I have encouraged her to recheck to your office to schedule an appointment, but could also likely benefit from having someone reach out to her as well. Thank you so much for your help. I look forward to hearing back from you. These let me know if there is anything else that I can do.  
 
 
Sincerely, 
 
 
Kahlil Walker MD

## 2019-03-25 NOTE — PROGRESS NOTES
Chief Complaint Patient presents with  Follow-up  
  hip pain--left 1. Have you been to the ER, urgent care clinic since your last visit? Hospitalized since your last visit? No 
 
2. Have you seen or consulted any other health care providers outside of the [address] [place] [address] since your last visit? Include any pap smears or colon screening. No 
 
Has been bothering her for a few months Tightness in chest

## 2019-03-25 NOTE — PROGRESS NOTES
Autumn Harrison 76 y.o. female 1950 
[address] 
[id number] [address]: Progress Note Herlinda Burt MD 
  
 
Encounter Date: 3/25/2019 Chief Complaint Patient presents with  Follow-up  
  hip pain--left History of Present Illness Autumn Harrison is a 76 y.o. female who presents to clinic today for follow-up on her left hip pain as well as other concerns. Left hip pain: Experienced some improvement in pain since last visit. She will occasionally use 400 mg of ibuprofen pain. She has not regularly done any of the exercises that Dr. James Samano had recommended. Pain Scale: 1/10 Left lower quadrant pain: Patient expresses concerns of a possible hernia of the left lower quadrant. States that when she is laying down at night and rest her hand portion of her abdomen she experiences significant pain. This is very point tender and occasionally will feel fullness in that area as well. Denies hematochezia or melena. Significant constipation or diarrhea. Diabetes: Patient denies polyuria, polydipsia, polyphagia. He continues on her Metformin without side effects. Hypertension: Blood pressure has been elevated at her prior visit. Patient has not taken her medication today and notes that she misses her medication several times per week. She states this is not intentional however she is not keen on regularly taking her blood pressure medication. Breast cancer: Patient states that she is ready to seek further evaluation of her breast cancer. This was diagnosed in January 2017 with an invasive mammary carcinoma with lobular features. Due to significant stresses patient has previously chosen not to seek follow-up despite multiple discussions in the past visits.   Patient states today that she is ready to move forward with testing and feels that she needs to start taking control of her own health. She has taken the next week off so that she can participate in these appointments. She notes that it is often difficult to get this time off from work. She does not have an appointment at this time. Patient denies any significant changes in the breast itself. She does experience some tenderness in the left chest with movement of the left upper extremity. Denies discharge from the left breast, pitting, changes in the skin, enlargement of nodules. Review of Systems General: Denies fevers, chills, confusion, weight has remained stable HEENT: Denies congestion, rhinorrhea, sore throat, ear pain 
Cardiac: Denies chest pain, palpitations, dyspnea on exertion Pulmonary: Denies shortness of breath, wheezing, cough Abdominal: Left lower quadrant pain. Patient denies nausea, vomiting, diarrhea or constipation hematochezia, melena : Denies dysuria, hematuria MSK: Left hip pain Skin: Denies rash Psych: Denies depression or anxiety Vitals/Objective:  
 
Vitals:  
 03/25/19 1007 BP: 150/87 Pulse: 75 Resp: 16 Temp: 98 °F (36.7 °C) TempSrc: Oral  
Weight: 147 lb 12.8 oz (67 kg) Height: 5' 3\" (1.6 m) Body mass index is 26.18 kg/m². Physical Exam  
Constitutional: She appears well-nourished. No distress. Eyes: Conjunctivae are normal. No scleral icterus. Cardiovascular: Normal rate, regular rhythm, normal heart sounds and intact distal pulses. No murmur heard. Abdominal: Soft. Normal appearance and bowel sounds are normal. She exhibits no shifting dullness, no distension and no ascites. There is no hepatosplenomegaly. There is no tenderness. There is no rebound, no guarding and no CVA tenderness. A hernia (Possible spigelian hernia in the left lower quadrant) is present. Psychiatric: She has a normal mood and affect. Vitals reviewed. Assessment and Plan: 1. Abdominal pain, LLQ Possible spigelian hernia. Will send patient for ultrasound.   If unable to identify or evaluate for hernia, patient may require a abdominal and pelvic CT. 
-  ABD LTD; Future 2. Essential hypertension Blood pressure above goal at this time patient reminded importance of taking medication regularly. Will get regular labs today. - METABOLIC PANEL, BASIC 
- CBC W/O DIFF 
- LIPID PANEL 3. Controlled type 2 diabetes mellitus without complication, without long-term current use of insulin ([facility] Follow-up on hemoglobin A1c today. Continue metformin. 
- HEMOGLOBIN A1C WITH EAG 
 
4. Malignant neoplasm of left female breast, unspecified estrogen receptor status, unspecified site of breast ([facility] Place referral to breast surgery and for diagnostic mammogram.  Stressed importance of early diagnosis and treatment and its relation to prognosis. Will forward information on to Dr. Pat Gary. - REFERRAL TO BREAST SURGERY 
- MICROALBUMIN, UR, RAND W/ MICROALB/CREAT RATIO 
- JOHN MAMMO LT DX INCL CAD; Future I have discussed the diagnosis with the patient and the intended plan as seen in the above orders. she has expressed understanding. The patient has received an after-visit summary and questions were answered concerning future plans. I have discussed medication side effects and warnings with the patient as well. Electronically Signed: Tali Pimentel MD 
  
History Patients past medical, surgical and family histories were reviewed and updated. Past Medical History:  
Diagnosis Date  Diabetes ([facility]  Gallstone  Hypertension  Invasive carcinoma of breast ([facility] 1/31/2017 Past Surgical History:  
Procedure Laterality Date  HX GYN  [id number]  
 hysterectomy - Fibroids Family History Problem Relation Age of Onset  Bleeding Prob Mother Anyerism- Brain  Diabetes Father  Other Father   
     hep Donah Manger Stroke Sister  No Known Problems Brother  No Known Problems Brother  No Known Problems Brother  No Known Problems Brother  No Known Problems Brother  Lupus Sister  No Known Problems Sister  No Known Problems Sister  No Known Problems Sister  Migraines Daughter  No Known Problems Daughter Social History Socioeconomic History  Marital status:  Spouse name: Not on file  Number of children: Not on file  Years of education: Not on file  Highest education level: Not on file Occupational History  Not on file Social Needs  Financial resource strain: Not on file  Food insecurity:  
  Worry: Not on file Inability: Not on file  Transportation needs:  
  Medical: Not on file Non-medical: Not on file Tobacco Use  Smoking status: Never Smoker  Smokeless tobacco: Never Used Substance and Sexual Activity  Alcohol use: No  
 Drug use: No  
 Sexual activity: Not Currently Birth control/protection: Surgical  
Lifestyle  Physical activity:  
  Days per week: Not on file Minutes per session: Not on file  Stress: Not on file Relationships  Social connections:  
  Talks on phone: Not on file Gets together: Not on file Attends Yazidi service: Not on file Active member of club or organization: Not on file Attends meetings of clubs or organizations: Not on file Relationship status: Not on file  Intimate partner violence:  
  Fear of current or ex partner: Not on file Emotionally abused: Not on file Physically abused: Not on file Forced sexual activity: Not on file Other Topics Concern  Not on file Social History Narrative  Not on file Current Medications/Allergies Current Outpatient Medications Medication Sig Dispense Refill  metFORMIN (GLUCOPHAGE) 500 mg tablet TAKE 1 TAB BY MOUTH TWO (2) TIMES DAILY (WITH MEALS). 180 Tab 3  
 olmesartan-hydroCHLOROthiazide (BENICAR HCT) 40-12.5 mg per tablet Take 1 Tab by mouth daily.  90 Tab 3  
  loratadine (CLARITIN) 10 mg tablet Take 1 Tab by mouth daily. 30 Tab 1  
 fluticasone (FLONASE) 50 mcg/actuation nasal spray 2 Sprays by Both Nostrils route daily. 1 Bottle 0 Allergies Allergen Reactions  Penicillins Rash and Swelling

## 2019-03-26 LAB
ALBUMIN/CREAT UR: 10.6 MG/G CREAT (ref 0–30)
BUN SERPL-MCNC: 12 MG/DL (ref 8–27)
BUN/CREAT SERPL: 16 (ref 12–28)
CALCIUM SERPL-MCNC: 10 MG/DL (ref 8.7–10.3)
CHLORIDE SERPL-SCNC: 102 MMOL/L (ref 96–106)
CHOLEST SERPL-MCNC: 177 MG/DL (ref 100–199)
CO2 SERPL-SCNC: 24 MMOL/L (ref 20–29)
CREAT SERPL-MCNC: 0.74 MG/DL (ref 0.57–1)
CREAT UR-MCNC: 128.2 MG/DL
ERYTHROCYTE [DISTWIDTH] IN BLOOD BY AUTOMATED COUNT: 13.8 % (ref 12.3–15.4)
EST. AVERAGE GLUCOSE BLD GHB EST-MCNC: 134 MG/DL
GLUCOSE SERPL-MCNC: 107 MG/DL (ref 65–99)
HBA1C MFR BLD: 6.3 % (ref 4.8–5.6)
HCT VFR BLD AUTO: 41 % (ref 34–46.6)
HDLC SERPL-MCNC: 78 MG/DL
HGB BLD-MCNC: 13.9 G/DL (ref 11.1–15.9)
INTERPRETATION, 910389: NORMAL
LDLC SERPL CALC-MCNC: 86 MG/DL (ref 0–99)
Lab: NORMAL
MCH RBC QN AUTO: 29 PG (ref 26.6–33)
MCHC RBC AUTO-ENTMCNC: 33.9 G/DL (ref 31.5–35.7)
MCV RBC AUTO: 85 FL (ref 79–97)
MICROALBUMIN UR-MCNC: 13.6 UG/ML
PLATELET # BLD AUTO: 217 X10E3/UL (ref 150–379)
POTASSIUM SERPL-SCNC: 3.9 MMOL/L (ref 3.5–5.2)
RBC # BLD AUTO: 4.8 X10E6/UL (ref 3.77–5.28)
SODIUM SERPL-SCNC: 143 MMOL/L (ref 134–144)
TRIGL SERPL-MCNC: 66 MG/DL (ref 0–149)
VLDLC SERPL CALC-MCNC: 13 MG/DL (ref 5–40)
WBC # BLD AUTO: 6.1 X10E3/UL (ref 3.4–10.8)

## 2019-03-28 ENCOUNTER — TELEPHONE (OUTPATIENT)
Dept: SURGERY | Age: 69
End: 2019-03-28

## 2019-03-28 DIAGNOSIS — Z17.0 MALIGNANT NEOPLASM OF UPPER-OUTER QUADRANT OF LEFT BREAST IN FEMALE, ESTROGEN RECEPTOR POSITIVE (HCC): Primary | ICD-10-CM

## 2019-03-28 DIAGNOSIS — C50.412 MALIGNANT NEOPLASM OF UPPER-OUTER QUADRANT OF LEFT BREAST IN FEMALE, ESTROGEN RECEPTOR POSITIVE (HCC): Primary | ICD-10-CM

## 2019-03-30 ENCOUNTER — OFFICE VISIT (OUTPATIENT)
Dept: FAMILY MEDICINE CLINIC | Age: 69
End: 2019-03-30

## 2019-03-30 VITALS
BODY MASS INDEX: 25.69 KG/M2 | TEMPERATURE: 98 F | RESPIRATION RATE: 18 BRPM | HEIGHT: 63 IN | WEIGHT: 145 LBS | OXYGEN SATURATION: 99 % | HEART RATE: 82 BPM | SYSTOLIC BLOOD PRESSURE: 131 MMHG | DIASTOLIC BLOOD PRESSURE: 79 MMHG

## 2019-03-30 DIAGNOSIS — R31.29 MICROSCOPIC HEMATURIA: ICD-10-CM

## 2019-03-30 DIAGNOSIS — R10.9 LEFT FLANK PAIN: Primary | ICD-10-CM

## 2019-03-30 DIAGNOSIS — M54.6 ACUTE LEFT-SIDED THORACIC BACK PAIN: ICD-10-CM

## 2019-03-30 LAB
BILIRUB UR QL STRIP: NEGATIVE
GLUCOSE UR-MCNC: NEGATIVE MG/DL
KETONES P FAST UR STRIP-MCNC: NEGATIVE MG/DL
PH UR STRIP: 5.5 [PH] (ref 4.6–8)
PROT UR QL STRIP: NEGATIVE
SP GR UR STRIP: 1.02 (ref 1–1.03)
UA UROBILINOGEN AMB POC: NORMAL (ref 0.2–1)
URINALYSIS CLARITY POC: CLEAR
URINALYSIS COLOR POC: YELLOW
URINE BLOOD POC: NORMAL
URINE LEUKOCYTES POC: NORMAL
URINE NITRITES POC: NEGATIVE

## 2019-03-30 NOTE — PROGRESS NOTES
Chief Complaint Patient presents with  Back Pain  
  x 3 days, left side  
 
she is a 76y.o. year old female who presents for evalution. She has pain in the upper back in the area of the left shoulder blade She took alleve and got relief a few hours later This seems to start about 10 pm 
She had a cough three days ago when it started The cough is gone now She has never been a smoker Reviewed PmHx, RxHx, FmHx, SocHx, AllgHx and updated and dated in the chart. Aspirin yes ____   No____ N/A____ Patient Active Problem List  
 Diagnosis  Osteopenia of multiple sites  Breast cancer, left ([facility]  
  2/2018 LEFT invasive lobular carcinoma, ER 95%. HI 95%, Her 2 - 
 
  
 Essential hypertension  Controlled type 2 diabetes mellitus without complication, without long-term current use of insulin ([facility] Nurse notes were reviewed and copied and are correct Review of Systems - negative except as listed above in the HPI Objective:  
 
Vitals:  
 03/30/19 1024 BP: 131/79 Pulse: 82 Resp: 18 Temp: 98 °F (36.7 °C) TempSrc: Oral  
SpO2: 99% Weight: 145 lb (65.8 kg) Height: 5' 3\" (1.6 m) Physical Examination: General appearance - alert, well appearing, and in no distress Mental status - alert, oriented to person, place, and time Chest - clear to auscultation, no wheezes, rales or rhonchi, symmetric air entry Heart - normal rate, regular rhythm, normal S1, S2, no murmurs, rubs, clicks or gallops Back exam - tenderness noted left flank Assessment/ Plan:  
Diagnoses and all orders for this visit: 1. Left flank pain -     AMB POC URINALYSIS DIP STICK AUTO W/O MICRO 
-     CULTURE, URINE 2. Acute left-sided thoracic back pain -     XR CHEST PA LAT; Future 3. Microscopic hematuria Follow-up and Dispositions · Return for asap for hematuria. has h/o breast cancer so checking for metastasis to the lung Also checking for indication of a stone. Looking for blood in the urine or bacteria Add: the chect xray is negative Follow up with pcp to follow up on  hematuria ICD-10-CM ICD-9-CM 1. Left flank pain R10.9 789.09 AMB POC URINALYSIS DIP STICK AUTO W/O MICRO  
   CULTURE, URINE 2. Acute left-sided thoracic back pain M54.6 724.1 XR CHEST PA LAT 3. Microscopic hematuria R31.29 599.72 I have discussed the diagnosis with the patient and the intended plan as seen in the above orders. The patient has received an after-visit summary and questions were answered concerning future plans. Medication Side Effects and Warnings were discussed with patient: yes Patient Labs were reviewed and or requested: yes Patient Past Records were reviewed and or requested: yes There are no Patient Instructions on file for this visit. The patient verbalizes understanding and agrees with the plan of care Patient has the advanced directives booklet to review

## 2019-03-30 NOTE — PROGRESS NOTES
Chief Complaint Patient presents with  Back Pain  
  x 3 days, left side 1. Have you been to the ER, urgent care clinic since your last visit? Hospitalized since your last visit? No 
 
2. Have you seen or consulted any other health care providers outside of the [address] [place] [address] since your last visit? Include any pap smears or colon screening. No  
 
Visit Vitals /79 (BP 1 Location: Right arm, BP Patient Position: Sitting) Pulse 82 Temp 98 °F (36.7 °C) (Oral) Resp 18 Ht 5' 3\" (1.6 m) Wt 145 lb (65.8 kg) SpO2 99% BMI 25.69 kg/m²

## 2019-03-31 ENCOUNTER — TELEPHONE (OUTPATIENT)
Dept: FAMILY MEDICINE CLINIC | Age: 69
End: 2019-03-31

## 2019-03-31 NOTE — TELEPHONE ENCOUNTER
----- Message from Tommy Freeman sent at 3/30/2019 12:15 PM EDT -----  Regarding: Dr. Domo Almonte  Pt is returning phone call from the practice. Best contact number [phone].

## 2019-04-01 LAB — BACTERIA UR CULT: NORMAL

## 2019-04-01 NOTE — TELEPHONE ENCOUNTER
Spoke with patient in regards to xray being negative per Dr Scout Reyes. Patient verbalized understanding of negative xray. Scheduled patient follow up with Dr Jonny Mujica for blood in urine.  Patient verbalized appointment time and date of 4/2/19 at 10:05 AM.

## 2019-04-02 ENCOUNTER — OFFICE VISIT (OUTPATIENT)
Dept: FAMILY MEDICINE CLINIC | Age: 69
End: 2019-04-02

## 2019-04-02 VITALS
DIASTOLIC BLOOD PRESSURE: 75 MMHG | BODY MASS INDEX: 25.52 KG/M2 | SYSTOLIC BLOOD PRESSURE: 122 MMHG | WEIGHT: 144 LBS | RESPIRATION RATE: 18 BRPM | OXYGEN SATURATION: 98 % | HEIGHT: 63 IN | TEMPERATURE: 98.4 F | HEART RATE: 71 BPM

## 2019-04-02 DIAGNOSIS — R30.0 DYSURIA: ICD-10-CM

## 2019-04-02 DIAGNOSIS — C50.812 MALIGNANT NEOPLASM OF OVERLAPPING SITES OF LEFT BREAST IN FEMALE, ESTROGEN RECEPTOR NEGATIVE (HCC): Primary | ICD-10-CM

## 2019-04-02 DIAGNOSIS — Z17.1 MALIGNANT NEOPLASM OF OVERLAPPING SITES OF LEFT BREAST IN FEMALE, ESTROGEN RECEPTOR NEGATIVE (HCC): Primary | ICD-10-CM

## 2019-04-02 DIAGNOSIS — R31.9 HEMATURIA, UNSPECIFIED TYPE: Primary | ICD-10-CM

## 2019-04-02 RX ORDER — GLUCOSAMINE SULFATE 1500 MG
POWDER IN PACKET (EA) ORAL DAILY
COMMUNITY

## 2019-04-02 NOTE — PROGRESS NOTES
Subjective:  
Iesha Mckeon is an 76 y.o. female who presents for follow up on hematuria. HPI Chief Complaint Patient presents with  Blood in Urine  
  follow up She was seen in the clinic 3 days ago for dysuria and was found to have hematuria. She states that since the last visit she had been having more dysuria. She has not ried any medications. She denies gross hematuria. Denies melena, hematochezia, hemoptysis. She is not on anticoagulation. She was recently diagnosed with breast cancer, is scheduled to see Dr. Babette Habermann. Has not received any chemo or radiation therapy. Allergies - reviewed: Allergies Allergen Reactions  Penicillins Rash and Swelling Medications - reviewed: 
Current Outpatient Medications Medication Sig  cholecalciferol (VITAMIN D3) 1,000 unit cap Take  by mouth daily.  metFORMIN (GLUCOPHAGE) 500 mg tablet TAKE 1 TAB BY MOUTH TWO (2) TIMES DAILY (WITH MEALS).  olmesartan-hydroCHLOROthiazide (BENICAR HCT) 40-12.5 mg per tablet Take 1 Tab by mouth daily.  fluticasone (FLONASE) 50 mcg/actuation nasal spray 2 Sprays by Both Nostrils route daily.  loratadine (CLARITIN) 10 mg tablet Take 1 Tab by mouth daily. No current facility-administered medications for this visit. Past Medical History - reviewed: 
Past Medical History:  
Diagnosis Date  Diabetes ([facility]  Gallstone  Hypertension  Invasive carcinoma of breast ([facility] 1/31/2017 Review of Systems CONSTITUTIONAL: denies fever. Denies chills. CARDIOVASCULAR: denies chest pain. Denies palpitations RESPIRATORY: denies shortness of breath. SKIN: denies rash. Denies easy bruising Objective:  
 
Visit Vitals /75 (BP 1 Location: Right arm, BP Patient Position: Sitting) Pulse 71 Temp 98.4 °F (36.9 °C) (Oral) Resp 18 Ht 5' 3\" (1.6 m) Wt 144 lb (65.3 kg) SpO2 98% BMI 25.51 kg/m² General appearance - alert, well appearing, and in no distress Chest - clear to auscultation, no wheezes, rales or rhonchi, symmetric air entry Heart - normal rate, regular rhythm, normal S1, S2, no murmurs, rubs, clicks or gallops Abdomen - soft, nontender, nondistended, no masses or organomegaly Assessment:  
77 yo female who is here for: ICD-10-CM ICD-9-CM 1. Hematuria, unspecified type R31.9 599.70 AMB POC URINALYSIS DIP STICK AUTO W/ MICRO REFERRAL TO UROLOGY  
   CULTURE, URINE 2. Dysuria R30.0 788. 1 Plan: · DD includes irritant cystitis,UTI but needs to rule malignancy · Recommended NSAIDs to help with pain · Sending urine cx · Sending the pt to urologist as she benefit from cystoscopy give the recently diagnosed breast cancer. I have discussed the diagnosis with the patient and the intended plan as seen in the above orders. The patient has received an after-visit summary and questions were answered concerning future plans. I have discussed medication side effects and warnings with the patient as well. Informed pt to return to the office if new symptoms arise.  
 
 
Daly Luu MD 
Family Medicine Resident, PGY-3

## 2019-04-02 NOTE — PATIENT INSTRUCTIONS
[facility] Address: [address] [place] [address], [address] Phone: [phone] Blood in the Urine: Care Instructions Your Care Instructions Blood in the urine, or hematuria, may make the urine look red, brown, or pink. There may be blood every time you urinate or just from time to time. You cannot always see blood in the urine, but it will show up in a urine test. 
Blood in the urine may be serious. It should always be checked by a doctor. Your doctor may recommend more tests, including an X-ray, a CT scan, or a cystoscopy (which lets a doctor look inside the urethra and bladder). Blood in the urine can be a sign of another problem. Common causes are bladder infections and kidney stones. An injury to your groin or your genital area can also cause bleeding in the urinary tract. Very hard exercisesuch as running a marathoncan cause blood in the urine. Blood in the urine can also be a sign of kidney disease or cancer in the bladder or kidney. Many cases of blood in the urine are caused by a harmless condition that runs in families. This is called benign familial hematuria. It does not need any treatment. Sometimes your urine may look red or brown even though it does not contain blood. For example, not getting enough fluids (dehydration), taking certain medicines, or having a liver problem can change the color of your urine. Eating foods such as beets, rhubarb, or blackberries or foods with red food coloring can make your urine look red or pink. Follow-up care is a key part of your treatment and safety. Be sure to make and go to all appointments, and call your doctor if you are having problems. It's also a good idea to know your test results and keep a list of the medicines you take. When should you call for help? Call your doctor now or seek immediate medical care if: 
  · You have symptoms of a urinary infection. For example: ? You have pus in your urine. ? You have pain in your back just below your rib cage. This is called flank pain. ? You have a fever, chills, or body aches. ? It hurts to urinate. ? You have groin or belly pain.  
  · You have more blood in your urine.  
 Watch closely for changes in your health, and be sure to contact your doctor if: 
  · You have new urination problems.  
  · You do not get better as expected. Where can you learn more? Go to http://charles-kristin.info/. Enter W256 in the search box to learn more about \"Blood in the Urine: Care Instructions. \" Current as of: March 20, 2018 Content Version: 11.9 © 4008-2301 [employer], [employer]. Care instructions adapted under license by [employer] (which disclaims liability or warranty for this information). If you have questions about a medical condition or this instruction, always ask your healthcare professional. [address] any warranty or liability for your use of this information.

## 2019-04-02 NOTE — PROGRESS NOTES
76year old female here for dysuria and hematuria followup Urine culture negative Needs cystoscopy I reviewed with the resident the medical history and the resident's findings on the physical examination. I discussed with the resident the patient's diagnosis and concur with the plan.

## 2019-04-02 NOTE — PROGRESS NOTES
Identified Patient with two Patient identifiers (Name and ). Two Patient Identifiers confirmed. Reviewed record in preparation for visit and have obtained necessary documentation. Chief Complaint Patient presents with  Blood in Urine  
  follow up Visit Vitals /75 (BP 1 Location: Right arm, BP Patient Position: Sitting) Pulse 71 Temp 98.4 °F (36.9 °C) (Oral) Resp 18 Ht 5' 3\" (1.6 m) Wt 144 lb (65.3 kg) SpO2 98% BMI 25.51 kg/m² 1. Have you been to the ER, urgent care clinic since your last visit? Hospitalized since your last visit? No 
 
2. Have you seen or consulted any other health care providers outside of the [address] [place] [address] since your last visit? Include any pap smears or colon screening.  No

## 2019-04-04 LAB — BACTERIA UR CULT: NORMAL

## 2019-04-23 ENCOUNTER — TELEPHONE (OUTPATIENT)
Dept: FAMILY MEDICINE CLINIC | Age: 69
End: 2019-04-23

## 2019-04-23 DIAGNOSIS — I10 ESSENTIAL HYPERTENSION: ICD-10-CM

## 2019-04-23 NOTE — TELEPHONE ENCOUNTER
Patient called stating the pharmacy ([facility]) told her a prior [place] is needed for olmesartan-hydroCHLOROthiazide. Upon talking to the patient more she is not sure if it is a prior auth they are asking for. I informed her her medication was refilled 4/2/19 and she was given 3 refills after that. Patient is going to call the pharmacy for clarification on what they are asking for.

## 2019-04-24 NOTE — TELEPHONE ENCOUNTER
Patient called back to check status of this refill request. I apologized and informed her the first time we spoke it was relayed to her that it was last refilled on 4/2/19 and that was incorrect, it was refilled last 4/2/18. I also informed her the original message was sent out to the nurse to work on this for her and was sent out again when the pharmacy contacted us. Patient is out of medication.

## 2019-04-25 RX ORDER — OLMESARTAN MEDOXOMIL AND HYDROCHLOROTHIAZIDE 40/12.5 40; 12.5 MG/1; MG/1
1 TABLET ORAL DAILY
Qty: 90 TAB | Refills: 3 | Status: SHIPPED | OUTPATIENT
Start: 2019-04-25 | End: 2019-08-30 | Stop reason: SDUPTHER

## 2019-04-25 NOTE — TELEPHONE ENCOUNTER
I called and spoke with patient and let her know that the refill has been sent to the pharmacy. Patient verbalized understanding.

## 2019-07-27 ENCOUNTER — OFFICE VISIT (OUTPATIENT)
Dept: FAMILY MEDICINE CLINIC | Age: 69
End: 2019-07-27

## 2019-07-27 VITALS
BODY MASS INDEX: 25.14 KG/M2 | OXYGEN SATURATION: 95 % | HEIGHT: 63 IN | DIASTOLIC BLOOD PRESSURE: 85 MMHG | SYSTOLIC BLOOD PRESSURE: 135 MMHG | TEMPERATURE: 98.2 F | HEART RATE: 74 BPM | RESPIRATION RATE: 18 BRPM | WEIGHT: 141.9 LBS

## 2019-07-27 DIAGNOSIS — R35.0 URINARY FREQUENCY: Primary | ICD-10-CM

## 2019-07-27 DIAGNOSIS — R31.9 URINARY TRACT INFECTION WITH HEMATURIA, SITE UNSPECIFIED: ICD-10-CM

## 2019-07-27 DIAGNOSIS — N20.0 KIDNEY STONE: ICD-10-CM

## 2019-07-27 DIAGNOSIS — N39.0 URINARY TRACT INFECTION WITH HEMATURIA, SITE UNSPECIFIED: ICD-10-CM

## 2019-07-27 DIAGNOSIS — R30.0 BURNING WITH URINATION: ICD-10-CM

## 2019-07-27 LAB
BILIRUB UR QL STRIP: NORMAL
GLUCOSE UR-MCNC: NORMAL MG/DL
KETONES P FAST UR STRIP-MCNC: NEGATIVE MG/DL
PH UR STRIP: 5 [PH] (ref 4.6–8)
PROT UR QL STRIP: NORMAL
SP GR UR STRIP: 1.01 (ref 1–1.03)
UA UROBILINOGEN AMB POC: NORMAL (ref 0.2–1)
URINALYSIS CLARITY POC: NORMAL
URINALYSIS COLOR POC: NORMAL
URINE BLOOD POC: NORMAL
URINE LEUKOCYTES POC: NEGATIVE
URINE NITRITES POC: POSITIVE

## 2019-07-27 RX ORDER — NITROFURANTOIN 25; 75 MG/1; MG/1
100 CAPSULE ORAL 2 TIMES DAILY
Qty: 10 CAP | Refills: 0 | Status: SHIPPED | OUTPATIENT
Start: 2019-07-27 | End: 2019-07-27

## 2019-07-27 RX ORDER — NITROFURANTOIN 25; 75 MG/1; MG/1
100 CAPSULE ORAL 2 TIMES DAILY
Qty: 10 CAP | Refills: 0 | Status: SHIPPED | OUTPATIENT
Start: 2019-07-27 | End: 2019-08-01

## 2019-07-27 NOTE — PROGRESS NOTES
Stacy Jones is a 76 y.o. female here today to address the following issues:  Chief Complaint   Patient presents with    Urinary Frequency     x 3 days    Other     patient states \"feels like pressure on bladder, going to bathroom every 15 minutes\"    Urinary Burning     States she went to ER at [address] [place] [address] and was treated for a kidney stone and UTI. Was given keflex at the time. States this completely resolved. Kidney stone was passed last month patient.        Past Medical History:   Diagnosis Date    Diabetes ([facility] [address]     Gallstone     Hypertension     Invasive carcinoma of breast ([facility] [address] 1/31/2017     Past Surgical History:   Procedure Laterality Date    HX GYN  1984    hysterectomy - Fibroids     Social History     Socioeconomic History    Marital status:      Spouse name: Not on file    Number of children: Not on file    Years of education: Not on file    Highest education level: Not on file   Occupational History    Not on file   Social Needs    Financial resource strain: Not on file    Food insecurity:     Worry: Not on file     Inability: Not on file    Transportation needs:     Medical: Not on file     Non-medical: Not on file   Tobacco Use    Smoking status: Never Smoker    Smokeless tobacco: Never Used   Substance and Sexual Activity    Alcohol use: No    Drug use: No    Sexual activity: Not Currently     Birth control/protection: Surgical   Lifestyle    Physical activity:     Days per week: Not on file     Minutes per session: Not on file    Stress: Not on file   Relationships    Social connections:     Talks on phone: Not on file     Gets together: Not on file     Attends Cheondoism service: Not on file     Active member of club or organization: Not on file     Attends meetings of clubs or organizations: Not on file     Relationship status: Not on file    Intimate partner violence:     Fear of current or ex partner: Not on file     Emotionally abused: Not on file     Physically abused: Not on file     Forced sexual activity: Not on file   Other Topics Concern    Not on file   Social History Narrative    Not on file       Allergies   Allergen Reactions    Penicillins Rash and Swelling       Current Outpatient Medications   Medication Sig    nitrofurantoin, macrocrystal-monohydrate, (MACROBID) 100 mg capsule Take 1 Cap by mouth two (2) times a day for 5 days.  metFORMIN (GLUCOPHAGE) 500 mg tablet TAKE 1 TABLET BY MOUTH TWICE A DAY WITH MEALS    olmesartan-hydroCHLOROthiazide (BENICAR HCT) 40-12.5 mg per tablet Take 1 Tab by mouth daily.  cholecalciferol (VITAMIN D3) 1,000 unit cap Take  by mouth daily.  loratadine (CLARITIN) 10 mg tablet Take 1 Tab by mouth daily. No current facility-administered medications for this visit. Review of Systems   Constitutional: Negative for chills and fever. Respiratory: Negative for shortness of breath and wheezing. Cardiovascular: Negative for chest pain and palpitations. Gastrointestinal: Negative for abdominal pain, diarrhea, nausea and vomiting. A comprehensive review of systems was negative except for that written in the HPI and listed above. Visit Vitals  /85 (BP 1 Location: Left arm, BP Patient Position: Sitting)   Pulse 74   Temp 98.2 °F (36.8 °C) (Oral)   Resp 18   Ht 5' 3\" (1.6 m)   Wt 141 lb 14.4 oz (64.4 kg)   SpO2 95%   BMI 25.14 kg/m²       Physical Exam   Constitutional: She is well-developed, well-nourished, and in no distress. No distress. Eyes: No scleral icterus. Pulmonary/Chest: Effort normal.   Abdominal: Soft. There is no tenderness. Neurological: She is alert. Skin: Skin is warm. She is not diaphoretic. Psychiatric: Affect normal.   Nursing note and vitals reviewed.       Recent Results (from the past 12 hour(s))   AMB POC URINALYSIS DIP STICK AUTO W/O MICRO    Collection Time: 07/27/19 10:24 AM   Result Value Ref Range    Color (UA POC) Rosi     Clarity (UA POC) Slightly Cloudy Glucose (UA POC) Trace Negative    Bilirubin (UA POC) 1+ Negative    Ketones (UA POC) Negative Negative    Specific gravity (UA POC) 1.015 1.001 - 1.035    Blood (UA POC) Trace Negative    pH (UA POC) 5.0 4.6 - 8.0    Protein (UA POC) 1+ Negative    Urobilinogen (UA POC) 1 mg/dL 0.2 - 1    Nitrites (UA POC) Positive Negative    Leukocyte esterase (UA POC) Negative Negative       1. Urinary frequency  - AMB POC URINALYSIS DIP STICK AUTO W/O MICRO    2. Burning with urination  - AMB POC URINALYSIS DIP STICK AUTO W/O MICRO    3. Urinary tract infection with hematuria, site unspecified  - URINE CULTURE,COMPREHENSIVE  - nitrofurantoin, macrocrystal-monohydrate, (MACROBID) 100 mg capsule; Take 1 Cap by mouth two (2) times a day for 5 days. Dispense: 10 Cap; Refill: 0    4. Kidney stone    She has passed her kidney stone. Will start [address]. Send for urine culture. Consider reimaging if she starts to have flank pain or no improvement of her symptoms as she may have a nathalia for recurrent infection. Follow-up and Dispositions    · Return in about 1 week (around 8/3/2019) for DM visit and follow up on UTI.            Ingris Vazquez MD, CAQSM, RMSK

## 2019-07-27 NOTE — PATIENT INSTRUCTIONS
Urinary Tract Infection in Women: Care Instructions  Your Care Instructions    A urinary tract infection, or UTI, is a general term for an infection anywhere between the kidneys and the urethra (where urine comes out). Most UTIs are bladder infections. They often cause pain or burning when you urinate. UTIs are caused by bacteria and can be cured with antibiotics. Be sure to complete your treatment so that the infection goes away. Follow-up care is a key part of your treatment and safety. Be sure to make and go to all appointments, and call your doctor if you are having problems. It's also a good idea to know your test results and keep a list of the medicines you take. How can you care for yourself at home? · Take your antibiotics as directed. Do not stop taking them just because you feel better. You need to take the full course of antibiotics. · Drink extra water and other fluids for the next day or two. This may help wash out the bacteria that are causing the infection. (If you have kidney, heart, or liver disease and have to limit fluids, talk with your doctor before you increase your fluid intake.)  · Avoid drinks that are carbonated or have caffeine. They can irritate the bladder. · Urinate often. Try to empty your bladder each time. · To relieve pain, take a hot bath or lay a heating pad set on low over your lower belly or genital area. Never go to sleep with a heating pad in place. To prevent UTIs  · Drink plenty of water each day. This helps you urinate often, which clears bacteria from your system. (If you have kidney, heart, or liver disease and have to limit fluids, talk with your doctor before you increase your fluid intake.)  · Urinate when you need to. · Urinate right after you have sex. · Change sanitary pads often. · Avoid douches, bubble baths, feminine hygiene sprays, and other feminine hygiene products that have deodorants.   · After going to the bathroom, wipe from front to back.  When should you call for help? Call your doctor now or seek immediate medical care if:    · Symptoms such as fever, chills, nausea, or vomiting get worse or appear for the first time.     · You have new pain in your back just below your rib cage. This is called flank pain.     · There is new blood or pus in your urine.     · You have any problems with your antibiotic medicine.    Watch closely for changes in your health, and be sure to contact your doctor if:    · You are not getting better after taking an antibiotic for 2 days.     · Your symptoms go away but then come back. Where can you learn more? Go to http://charles-kristin.info/. Enter N685 in the search box to learn more about \"Urinary Tract Infection in Women: Care Instructions. \"  Current as of: December 19, 2018  Content Version: 12.1  © 2340-8650 [employer]. Care instructions adapted under license by [employer] (which disclaims liability or warranty for this information). If you have questions about a medical condition or this instruction, always ask your healthcare professional. [address] any warranty or liability for your use of this information.

## 2019-07-27 NOTE — PROGRESS NOTES
Chief Complaint   Patient presents with    Urinary Frequency     x 3 days    Other     patient states \"feels like pressure on bladder, going to bathroom every 15 minutes\"    Urinary Burning     1. Have you been to the ER, urgent care clinic since your last visit? Hospitalized since your last visit? Yes When: June 2019 Where: [facility] Reason: abdomen pain, UTI, Kidney stones    2. Have you seen or consulted any other health care providers outside of the [address] [place] [address] since your last visit? Include any pap smears or colon screening.  No  Visit Vitals  /85 (BP 1 Location: Left arm, BP Patient Position: Sitting)   Pulse 74   Temp 98.2 °F (36.8 °C) (Oral)   Resp 18   Ht 5' 3\" (1.6 m)   Wt 141 lb 14.4 oz (64.4 kg)   SpO2 95%   BMI 25.14 kg/m²     Health Maintenance Due   Topic Date Due    EYE EXAM RETINAL OR DILATED  08/15/1960    DTaP/Tdap/Td series (1 - Tdap) 08/15/1971    Shingrix Vaccine Age 50> (1 of 2) 08/15/2000    FOBT Q 1 YEAR AGE 50-75  08/15/2000    GLAUCOMA SCREENING Q2Y  08/15/2015    Pneumococcal 65+ years (1 of 2 - PCV13) 08/15/2015    MEDICARE YEARLY EXAM  05/18/2018    FOOT EXAM Q1  06/07/2018    BREAST CANCER SCRN MAMMOGRAM  01/09/2019

## 2019-07-30 LAB — BACTERIA UR CULT: NORMAL

## 2019-08-02 ENCOUNTER — OFFICE VISIT (OUTPATIENT)
Dept: FAMILY MEDICINE CLINIC | Age: 69
End: 2019-08-02

## 2019-08-02 VITALS
SYSTOLIC BLOOD PRESSURE: 156 MMHG | HEIGHT: 63 IN | RESPIRATION RATE: 20 BRPM | WEIGHT: 144.6 LBS | TEMPERATURE: 98.4 F | BODY MASS INDEX: 25.62 KG/M2 | DIASTOLIC BLOOD PRESSURE: 90 MMHG | HEART RATE: 74 BPM | OXYGEN SATURATION: 99 %

## 2019-08-02 DIAGNOSIS — G89.29 CHRONIC LLQ PAIN: ICD-10-CM

## 2019-08-02 DIAGNOSIS — R31.9 HEMATURIA, UNSPECIFIED TYPE: ICD-10-CM

## 2019-08-02 DIAGNOSIS — R10.32 CHRONIC LLQ PAIN: ICD-10-CM

## 2019-08-02 DIAGNOSIS — R35.0 URINARY FREQUENCY: Primary | ICD-10-CM

## 2019-08-02 LAB
BACTERIA UA POCT, BACTPOCT: NORMAL
BILIRUB UR QL STRIP: NEGATIVE
CRYSTALS UA POCT, CRYSPOCT: NEGATIVE
EPITHELIAL CELLS POCT, EPITHPOCT: NORMAL
GLUCOSE UR-MCNC: NEGATIVE MG/DL
KETONES P FAST UR STRIP-MCNC: NEGATIVE MG/DL
Lab: NORMAL
PH UR STRIP: 7 [PH] (ref 4.6–8)
PROT UR QL STRIP: NEGATIVE
RBC UA POCT, RBCPOCT: NORMAL
SP GR UR STRIP: 1.02 (ref 1–1.03)
UA UROBILINOGEN AMB POC: NORMAL (ref 0.2–1)
URINALYSIS CLARITY POC: NORMAL
URINALYSIS COLOR POC: YELLOW
URINE BLOOD POC: NORMAL
URINE LEUKOCYTES POC: NEGATIVE
URINE NITRITES POC: NEGATIVE
WBC UA POCT, WBCPOCT: NORMAL

## 2019-08-02 NOTE — PROGRESS NOTES
Health Maintenance Due   Topic Date Due    EYE EXAM RETINAL OR DILATED  08/15/1960    DTaP/Tdap/Td series (1 - Tdap) 08/15/1971    Shingrix Vaccine Age 50> (1 of 2) 08/15/2000    FOBT Q 1 YEAR AGE 50-75  08/15/2000    GLAUCOMA SCREENING Q2Y  08/15/2015    Pneumococcal 65+ years (1 of 2 - PCV13) 08/15/2015    MEDICARE YEARLY EXAM  05/18/2018    FOOT EXAM Q1  06/07/2018    BREAST CANCER SCRN MAMMOGRAM  01/09/2019    Influenza Age 9 to Adult  08/01/2019

## 2019-08-02 NOTE — PATIENT INSTRUCTIONS
Call Urology to schedule a visit Schedule two visits up front: 
 1) Medicare Wellness Visit 2) Blood pressure and Diabetes follow up Medicare Wellness Visit, Female The best way to live healthy is to have a lifestyle where you eat a well-balanced diet, exercise regularly, limit alcohol use, and quit all forms of tobacco/nicotine, if applicable. Regular preventive services are another way to keep healthy. Preventive services (vaccines, screening tests, monitoring & exams) can help personalize your care plan, which helps you manage your own care. Screening tests can find health problems at the earliest stages, when they are easiest to treat. [facility] follows the current, evidence-based guidelines published by the [facility]one ([facility]) when recommending preventive services for our patients. Because we follow these guidelines, sometimes recommendations change over time as research supports it. (For example, mammograms used to be recommended annually. Even though Medicare will still pay for an annual mammogram, the newer guidelines recommend a mammogram every two years for women of average risk.) Of course, you and your doctor may decide to screen more often for some diseases, based on your risk and your health status. Preventive services for you include: - Medicare offers their members a free annual wellness visit, which is time for you and your primary care provider to discuss and plan for your preventive service needs. Take advantage of this benefit every year! 
-All adults over the age of 72 should receive the recommended pneumonia vaccines. Current USPSTF guidelines recommend a series of two vaccines for the best pneumonia protection.  
-All adults should have a flu vaccine yearly and a tetanus vaccine every 10 years. All adults age 61 and older should receive a shingles vaccine once in their lifetime. -A bone mass density test is recommended when a woman turns 65 to screen for osteoporosis. This test is only recommended one time, as a screening. Some providers will use this same test as a disease monitoring tool if you already have osteoporosis. -All adults age 38-68 who are overweight should have a diabetes screening test once every three years.  
-Other screening tests and preventive services for persons with diabetes include: an eye exam to screen for diabetic retinopathy, a kidney function test, a foot exam, and stricter control over your cholesterol.  
-Cardiovascular screening for adults with routine risk involves an electrocardiogram (ECG) at intervals determined by your doctor.  
-Colorectal cancer screenings should be done for adults age 54-65 with no increased risk factors for colorectal cancer. There are a number of acceptable methods of screening for this type of cancer. Each test has its own benefits and drawbacks. Discuss with your doctor what is most appropriate for you during your annual wellness visit. The different tests include: colonoscopy (considered the best screening method), a fecal occult blood test, a fecal DNA test, and sigmoidoscopy. -Breast cancer screenings are recommended every other year for women of normal risk, age 54-69. 
-Cervical cancer screenings for women over age 72 are only recommended with certain risk factors.  
-All adults born between [facility] should be screened once for Hepatitis C. Here is a list of your current Health Maintenance items (your personalized list of preventive services) with a due date: 
Health Maintenance Due Topic Date Due  Eye Exam  08/15/1960  
 DTaP/Tdap/Td  (1 - Tdap) 08/15/1971  Shingles Vaccine (1 of 2) 08/15/2000  Stool testing for trace blood  08/15/2000  Glaucoma Screening   08/15/2015  Pneumococcal Vaccine (1 of 2 - PCV13) 08/15/2015 [facility] Annual Well Visit  05/18/2018 [facility] Diabetic Foot Care  06/07/2018  Mammogram  01/09/2019  Flu Vaccine  08/01/2019

## 2019-08-02 NOTE — PROGRESS NOTES
HPI       Chief Complaint: F/u urinary sypmtoms    Nigel Shepherd is a 76 y.o. female who presents to f/u urinary symptoms    Seen 7/27 for urinary frequency x 3 days, pelvic pressure, and dysuria in the setting of passsage of kidney stone last month. Given macrobid x 5 days. UCx negative. Was recommended to f/u to check on sxs as if no improvement may need repeat imaging as she may have nathalia for recurrent infx. Dysuria has resolved, frequency has improved significantly but has not resolved - but she attributes this to the HCTZ that she is on. No gross hematuria at home. Pelvic pressure and LLQ pain still present but she states she has had this for years - worse when she lifts something heavy. Describes it as soreness/pressure \"coming from her LLQ and radiates down to her pubic bone. \" No pain at rest, but when she lifts something heavy will have 9/10 pain. Says she has discussed this with providers in the past and the attribute it to hip arthritis. Switches between Aleve and tylenol, taking it twice a week. This pain does not limit her daily activities. Pain has not been getting worse. Denies hx of abnormal pap smears, but cannot remember the last one she had. At least 3 years ago. Thinks she had them regularly before the age of 72 but not sure and does not have records. Last saw urology 1 month ago with [address] [place] [address] when she had kidney stone (? Dr. Cierra Holland? Thinks it was with [facility]). Does not have any follow up visits scheduled. Did not have a cystoscopy. No fevers. No flank pain. She thinks she had an abdominal CT at [address] [place] [address] last month.      PMHx:  Past Medical History:   Diagnosis Date    Diabetes ([facility] [address]     Gallstone     Hypertension     Invasive carcinoma of breast ([facility] [address] 1/31/2017     Meds:   Current Outpatient Medications   Medication Sig Dispense Refill    metFORMIN (GLUCOPHAGE) 500 mg tablet TAKE 1 TABLET BY MOUTH TWICE A DAY WITH MEALS 180 Tab 0    olmesartan-hydroCHLOROthiazide (BENICAR HCT) 40-12.5 mg per tablet Take 1 Tab by mouth daily. 90 Tab 3    cholecalciferol (VITAMIN D3) 1,000 unit cap Take  by mouth daily.  loratadine (CLARITIN) 10 mg tablet Take 1 Tab by mouth daily. 30 Tab 1     Allergies: Allergies   Allergen Reactions    Penicillins Rash and Swelling     ROS  Review of Systems   Constitutional: Negative for chills, fever and weight loss. Eyes: Negative for blurred vision and double vision. Respiratory: Negative for shortness of breath and wheezing. Cardiovascular: Negative for chest pain and palpitations. Gastrointestinal: Negative for abdominal pain, constipation, diarrhea, nausea and vomiting. Genitourinary: Negative for dysuria, flank pain, frequency and urgency. Pelvic pressure   Neurological: Negative for dizziness and headaches. Physical Exam:  Visit Vitals  /90 (BP 1 Location: Right arm, BP Patient Position: Sitting)   Pulse 74   Temp 98.4 °F (36.9 °C) (Oral)   Resp 20   Ht 5' 3\" (1.6 m)   Wt 144 lb 9.6 oz (65.6 kg)   SpO2 99%   BMI 25.61 kg/m²       Wt Readings from Last 3 Encounters:   08/02/19 144 lb 9.6 oz (65.6 kg)   07/27/19 141 lb 14.4 oz (64.4 kg)   04/02/19 144 lb (65.3 kg)     BP Readings from Last 3 Encounters:   08/02/19 156/90   07/27/19 135/85   04/02/19 122/75      Physical Exam   Constitutional: She is oriented to person, place, and time. She appears well-developed and well-nourished. Cardiovascular: Normal rate and regular rhythm. No murmur heard. Pulmonary/Chest: Effort normal and breath sounds normal. No respiratory distress. She has no wheezes. She has no rales. Abdominal: Soft. Bowel sounds are normal. She exhibits no distension. Mild suprapubic tenderness. LLQ tenderness to palpation, no rebound or guarding. No organomegaly palpable   Musculoskeletal:   No hip tenderness   Neurological: She is alert and oriented to person, place, and time. Skin: Skin is warm and dry.    Psychiatric: She has a normal mood and affect. Vitals reviewed. I personally reviewed the following lab results:   Recent Results (from the past 12 hour(s))   AMB POC URINALYSIS DIP STICK AUTO W/O MICRO    Collection Time: 08/02/19  9:39 AM   Result Value Ref Range    Color (UA POC) Yellow     Clarity (UA POC) Slightly Cloudy     Glucose (UA POC) Negative Negative    Bilirubin (UA POC) Negative Negative    Ketones (UA POC) Negative Negative    Specific gravity (UA POC) 1.020 1.001 - 1.035    Blood (UA POC) 2+ Negative    pH (UA POC) 7.0 4.6 - 8.0    Protein (UA POC) Negative Negative    Urobilinogen (UA POC) 0.2 mg/dL 0.2 - 1    Nitrites (UA POC) Negative Negative    Leukocyte esterase (UA POC) Negative Negative   AMB POC URINALYSIS; MICROSCOPIC ONLY    Collection Time: 08/02/19 10:24 AM   Result Value Ref Range    Epithelial cells (UA POC) rare     WBCs (UA POC) 0-3     RBCs (UA POC) 10-20     Bacteria (UA POC) Rare Negative    Crystals (UA POC) Negative Negative    Other (UA POC) none              Assessment     76 y.o. female with:    ICD-10-CM ICD-9-CM    1. Urinary frequency R35.0 788.41 AMB POC URINALYSIS DIP STICK AUTO W/O MICRO      AMB POC URINALYSIS; MICROSCOPIC ONLY   2. Chronic LLQ pain R10.32 789.04     G89.29 338.29    3. Hematuria, unspecified type R31.9 599.70 REFERRAL TO UROLOGY              Plan     1. Urinary frequency  Dysuria, frequency resolved    2. Hematuria  UA significant for 2+ blood, microscopy with 10-20 RBCs. Personally reviewed by myself. Will refer to Urology, has had trace blood in all previous UAs, no signs of infx today. .   - AMB POC URINALYSIS DIP STICK AUTO W/O MICRO  - AMB POC URINALYSIS; MICROSCOPIC ONLY  - REFERRAL TO UROLOGY    3. Chronic LLQ pain  Stable, with no acute worsening. Not affecting daily activities.    - Plan for pelvic exam at next visit  - Pt signed release of records form for us to obtain imaging/notes records from [address] [place] [address] from recent hospitalization    Follow-up and Dispositions    · Return for 1) medicare wellness/well woman 2) blood pressure and diabetes follow up - as early as possible . Patient discussed with Dr. Katie Villanueva (attending physician)    I have discussed the diagnosis with the patient and the intended plan as seen in the above orders. The patient has received an after-visit summary and questions were answered concerning future plans. I have discussed medication side effects and warnings with the patient as well.     Yisel Lees MD  Family Medicine Resident  PGY-3

## 2019-08-06 ENCOUNTER — DOCUMENTATION ONLY (OUTPATIENT)
Dept: FAMILY MEDICINE CLINIC | Age: 69
End: 2019-08-06

## 2019-08-06 NOTE — PROGRESS NOTES
Received records from [address] [place] [address]    ED Visit 5/29/19  HPI - L flank pain, nausea x 4 days. Pain similar to previous kidney stone. PE - L CVA tenderness  CT Abd Pelvis w/o IV contrast done 5/29 --> \"4mm calculus in the proximal left ureter at the UPJ, with mild left hydrnephrosis and hydroureter to this level. Absent uterus. Large lamellated gallstone in the gallbladder. Impression - obstructing left UPJ 4mm calculus. Cholelithias\"   UA had large blood, trace protein, trace LE, 6-8 RBC, 1-5 WBC, occ epi, slight bacteria, slight mucous  She was treated with rocephin, toradol, zofran. Pt was dc'd home with Urology follow up.      Will place in [facility] ileana Stewart MD  08/06/19  8:55 AM

## 2019-08-09 ENCOUNTER — OFFICE VISIT (OUTPATIENT)
Dept: FAMILY MEDICINE CLINIC | Age: 69
End: 2019-08-09

## 2019-08-09 VITALS
OXYGEN SATURATION: 99 % | DIASTOLIC BLOOD PRESSURE: 98 MMHG | TEMPERATURE: 98 F | RESPIRATION RATE: 16 BRPM | BODY MASS INDEX: 25.52 KG/M2 | HEART RATE: 74 BPM | HEIGHT: 63 IN | WEIGHT: 144 LBS | SYSTOLIC BLOOD PRESSURE: 173 MMHG

## 2019-08-09 DIAGNOSIS — R35.0 URINARY FREQUENCY: Primary | ICD-10-CM

## 2019-08-09 LAB
BILIRUB UR QL STRIP: NORMAL
GLUCOSE UR-MCNC: NORMAL MG/DL
KETONES P FAST UR STRIP-MCNC: NORMAL MG/DL
PH UR STRIP: 5.5 [PH] (ref 4.6–8)
PROT UR QL STRIP: NORMAL
SP GR UR STRIP: 1.02 (ref 1–1.03)
UA UROBILINOGEN AMB POC: NORMAL (ref 0.2–1)
URINALYSIS CLARITY POC: CLEAR
URINALYSIS COLOR POC: NORMAL
URINE BLOOD POC: NORMAL
URINE LEUKOCYTES POC: NEGATIVE
URINE NITRITES POC: POSITIVE

## 2019-08-09 RX ORDER — IBUPROFEN 600 MG/1
600 TABLET ORAL
Qty: 20 TAB | Refills: 0 | Status: SHIPPED | OUTPATIENT
Start: 2019-08-09 | End: 2019-08-14

## 2019-08-09 NOTE — PROGRESS NOTES
Identified Patient with two Patient identifiers (Name and ). Two Patient Identifiers confirmed. Reviewed record in preparation for visit and have obtained necessary documentation. Chief Complaint   Patient presents with    UTI     c/o urinary/bladder pressure, urinary burning, urinary frequency x 7 days. Taking OTC AZO and Aleve/Tylenol as needed       Visit Vitals  BP (!) 173/98 (BP 1 Location: Right arm, BP Patient Position: Sitting)   Pulse 74   Temp 98 °F (36.7 °C) (Oral)   Resp 16   Ht 5' 3\" (1.6 m)   Wt 144 lb (65.3 kg)   SpO2 99%   BMI 25.51 kg/m²       1. Have you been to the ER, urgent care clinic since your last visit? Hospitalized since your last visit? No    2. Have you seen or consulted any other health care providers outside of the [address] [place] [address] since your last visit? Include any pap smears or colon screening.  No

## 2019-08-09 NOTE — PROGRESS NOTES
Melba Buenrostro is a 76 y.o. female who had concerns including UTI (c/o urinary/bladder pressure, urinary burning, urinary frequency x 7 days. Taking OTC AZO and Aleve/Tylenol as needed). HPI:  Has been having symptoms off and on x3 weeks. Came here on 8/2 was feeling ok at that time. Having dysuria (sharp and burning pain). Having LLQ pressure when she urinates. No changes in smell, amount, color. Urinating every 15 minutes and does urinate every time (small amount). Has been taking AZO- but does not help with pain relief. Has to take Aleve for pain when she urinates- does not last that long. Denied low back pain. Does have some lower abdominal pressure all the time. Has appointment with urologist on 8/20, and she is planning to go. Given nitrofurantoin on 7/27, completed the antibiotic but had some dizziness and rash. Feels like she got a little better after completing the nitrofurantoin. Denied fever, nausea, vomiting. Having constipation-has a bowel movement every 2-3 days (normally goes every 1-2 days). Never had a colonoscopy before.     ROS: (positive in bold)  General: wt loss, fever, chills  HEENT: changes in vision  Cardiac: chest pain  Pul: SOB  GI: abdominal pain, nausea, vomiting, diarrhea, constipation   : dysuria, freq, urgency  MSK: back pain  Neuro: weakness  Psych: anxiety, depression  Skin: rashes or suspicious skin lesions    Past Medical History:  Past Medical History:   Diagnosis Date    Diabetes [facility]     Gallstone     Hypertension     Invasive carcinoma of breast ([facility] 1/31/2017       Past Surgical History:  Past Surgical History:   Procedure Laterality Date    HX GYN  1984    hysterectomy - Fibroids       Family History:  Family History   Problem Relation Age of Onset    Bleeding Prob Mother         Anyerism- Brain    Diabetes Father     Other Father         hep C    Stroke Sister     No Known Problems Brother     No Known Problems Brother     No Known Problems Brother  No Known Problems Brother     No Known Problems Brother     Lupus Sister     No Known Problems Sister     No Known Problems Sister     No Known Problems Sister    Vinita Edwards Migraines Daughter     No Known Problems Daughter        Allergies: Allergies   Allergen Reactions    Penicillins Rash and Swelling       Social History:  Social History     Tobacco Use    Smoking status: Never Smoker    Smokeless tobacco: Never Used   Substance Use Topics    Alcohol use: No    Drug use: No       Current Meds:  Current Outpatient Medications on File Prior to Visit   Medication Sig Dispense Refill    metFORMIN (GLUCOPHAGE) 500 mg tablet TAKE 1 TABLET BY MOUTH TWICE A DAY WITH MEALS 180 Tab 0    olmesartan-hydroCHLOROthiazide (BENICAR HCT) 40-12.5 mg per tablet Take 1 Tab by mouth daily. 90 Tab 3    cholecalciferol (VITAMIN D3) 1,000 unit cap Take  by mouth daily.  loratadine (CLARITIN) 10 mg tablet Take 1 Tab by mouth daily. 30 Tab 1     No current facility-administered medications on file prior to visit. Physical Exam:  Visit Vitals  BP (!) 173/98 (BP 1 Location: Right arm, BP Patient Position: Sitting)   Pulse 74   Temp 98 °F (36.7 °C) (Oral)   Resp 16   Ht 5' 3\" (1.6 m)   Wt 144 lb (65.3 kg)   SpO2 99%   BMI 25.51 kg/m²       Gen:  Well developed, well nourished female in no acute distress  HEENT: normocephalic/atraumatic  Neck:   Supple  Card:  RRR, no murmurs  Chest:  CTAB posteriorly  Abd:  BS+, soft, tenderness to palpation in LUQ, LLQ, suprapubic  Extr:  2+ pulses BL, no LE edema   MSK:  Full ROM of L hip, tenderness to palpation at L pubic bone. Negative CVA tenderness. Neuro: CN II-XII grossly intact  Psych:  normal mood and affect   Skin:  No rashes or suspicious skin lesions noted    UA reviewed by me: 1+ protein,+ leuk esterase, neg nitrites    Assessment/Plan:    1. Urinary frequency- interstitial cystitis.  UA without evidence of UTI.  -no abx at this time given only slightly normal UA  -advised to attend urology appt on 8/20  -advised good fluid intake  -trial Colace, Miralax to help with constipation (will buy OTC)  -ibuprofen 600mg qid PRN pain, given #20  - AMB POC URINALYSIS DIP STICK AUTO W/O MICRO- reviewed by me- +leuk esterase, neg nitrites  -will send urine for culture, if positive, will send antibiotics to pharmacy and advise patient    2. HTN: is on diuretic of olmesartan-HCTZ (since April 2019)  -consider switching to different BP med given urinary symptoms  -recheck BP at next visit    I have discussed the diagnosis with the patient and the intended plan as seen in the above orders. The patient has received an after-visit summary and questions were answered concerning future plans. I have discussed medication side effects and warnings with the patient as well. The patient agrees and understands above plan. Follow-up and Dispositions    · Return for 8/19 previously scheduled appt. Patient discussed with supervising attending.     Sadie Reveles, DO  Primary Care/Sports Medicine Fellow

## 2019-08-10 LAB — BACTERIA UR CULT: NORMAL

## 2019-08-18 NOTE — PROGRESS NOTES
[facility] [id number]  [address] [id number] Josie Bravo   [phone]             Date of visit: 8/19/2019       This is an Subsequent Medicare Annual Wellness Visit (AWV), (Performed more than 12 months after effective date of Medicare Part B enrollment and 12 months after last preventive visit, Once in a lifetime)    I have reviewed the patient's medical history in detail and updated the computerized patient record. Andres Feng is a 71 y.o. female   History obtained from: the patient. Concerns today   (Patient understands that medical problems addressed today may incur additional cost as this is a preventive visit)    Urinary symptoms - urology appt is tomorrow. Reports symptoms are resolved. Chest tightness and cough - both started at the same time. Feels like she is having a lot of mucous. Present x 1 week. Chest heaviness present with cough but also occasionally without cough. Occurs once every other day, lasting about an hour, resolves with zantac. Worse with exertion. Has hx of heartburn, this feels similar. Currently very slight chest heaviness. No SOB, no paresthesias, no neck pain, jaw or shoulder pain. Bought mucinex today but hasn't started it yet. Cough is dry. No fevers, chills. +Rhinorrhea. No nasal congestion. No sore throat. No sick contacts recently. Denies hx of similar. No history of cardiac abnormalities. DMII - pt requesting recheck of A1c today. Last A1c 3.2019, was 6.3.      History     Patient Active Problem List   Diagnosis Code    Essential hypertension I10    Controlled type 2 diabetes mellitus without complication, without long-term current use of insulin ([address] E11.9    Breast cancer, left ([address] C50.912    Osteopenia of multiple sites M85.89     Past Medical History:   Diagnosis Date    Diabetes ([address]     Gallstone     Hypertension     Invasive carcinoma of breast ([address] 1/31/2017      Past Surgical History:   Procedure Laterality Date    HX GYN  1984    hysterectomy - Fibroids     Allergies   Allergen Reactions    Penicillins Rash and Swelling     Current Outpatient Medications   Medication Sig Dispense Refill    pneumococcal 13 meghan conj dip (PREVNAR-13) 0.5 mL syrg injection 0.5 mL by IntraMUSCular route once for 1 dose. 0.5 mL 0    varicella-zoster recombinant, PF, (SHINGRIX, PF,) 50 mcg/0.5 mL susr injection 0.5 mL by IntraMUSCular route once for 1 dose. 2nd dose to be given 2-6 mos after initial dose 0.5 mL 1    metFORMIN (GLUCOPHAGE) 500 mg tablet TAKE 1 TABLET BY MOUTH TWICE A DAY WITH MEALS 180 Tab 0    olmesartan-hydroCHLOROthiazide (BENICAR HCT) 40-12.5 mg per tablet Take 1 Tab by mouth daily. 90 Tab 3    cholecalciferol (VITAMIN D3) 1,000 unit cap Take  by mouth daily.  loratadine (CLARITIN) 10 mg tablet Take 1 Tab by mouth daily. 27 Tab 1     Family History   Problem Relation Age of Onset    Bleeding Prob Mother         Anyerism- Brain    Diabetes Father     Other Father         hep C    Stroke Sister     No Known Problems Brother     No Known Problems Brother     No Known Problems Brother     No Known Problems Brother     No Known Problems Brother     Lupus Sister     No Known Problems Sister     No Known Problems Sister     No Known Problems Sister    Kimi Soria Migraines Daughter     No Known Problems Daughter      Social History     Tobacco Use    Smoking status: Never Smoker    Smokeless tobacco: Never Used   Substance Use Topics    Alcohol use: No       Specialists/Care Team   Greenwood Bright has established care with the following healthcare providers:  Patient Care Team:  Mercy Vergara MD as PCP - General (Family Practice)    Health Risk Assessment     Demographics   female  71 y.o.     General Health Questions   -During the past 4 weeks:   -how would you rate your health in general? Good   -how often have you been bothered by feeling dizzy when standing up? occasionally   -how much have you been bothered by bodily pain? moderately   -Have you noticed any hearing difficulties? no   -has your physical and emotional health limited your social activities with family or friends? no    Emotional Health Questions   -Do you have a history of depression, anxiety, or emotional problems? no  -Over the past 2 weeks, have you felt down, depressed or hopeless? no  -Over the past 2 weeks, have you felt little interest or pleasure in doing things? no    Health Habits   Please describe your diet habits: Eat my meals regularly at scheduled times, exercise daily, a lot of walking, limiting sugar intake  Do you get 5 servings of fruits or vegetables daily? no  Do you exercise regularly? yes    Activities of Daily Living and Functional Status   -Do you need help with eating, walking, dressing, bathing, toileting, the phone, transportation, shopping, preparing meals, housework, laundry, medications or managing money? no  -In the past four weeks, was someone available to help you if you needed and wanted help with anything? yes  -Are you confident are you that you can control and manage most of your health problems? yes  -Have you been given information to help you keep track of your medications? yes  -How often do you have trouble taking your medications as prescribed? never    Fall Risk and Home Safety   Have you fallen 2 or more times in the past year? no  Does your home have rugs in the hallway, lack grab bars in the bathroom, lack handrails on the stairs or have poor lighting? no  Do you have smoke detectors and check them regularly?  yes  Do you have difficulties driving a car? no  Do you always fasten your seat belt when you are in a car? yes    Review of Systems (if indicated for problems addressed today)   No fever, chills, N/V, SOB, HA or vision changes, abdominal pain, diarrhea  + chest tightness, lightheadedness x 1 month  No LE edema, heart palpitations    Physical Examination     Vitals:    08/19/19 0923   BP: 141/87   Pulse: 72   Resp: 16   Temp: 99.4 °F (37.4 °C)   TempSrc: Oral   SpO2: 99%   Weight: 144 lb (65.3 kg)   Height: 5' 3\" (1.6 m)     Body mass index is 25.51 kg/m². No exam data present  Was the patient's timed Up & Go test unsteady or longer than 30 seconds? no    Evaluation of Cognitive Function   Mood/affect:  neutral  Orientation: Person, Place, Time and Situation  Appearance: age appropriate  Family member/caregiver input: None    Additional exam if indicated for problems addressed today:  Gen: A&O x 3, NAD  HEENT: TMs wnl bilaterally, clear nasal discharge. Oropharynx without tonsillar swelling or exudates   Resp: CTAB, no wheezing/rhonchi/crackles. Normal inspiratory effort  Cardiac: regular rate and normal rhythm. Chest pain is reproducible with palpation of sternum  Abdomen: normal BS, soft, nontender  Extremities: No LE edema    Advice/Referrals/Counseling (as indicated)   Education and counseling provided for any problems identified above:     Preventive Services     Discussed today Done Previously Not Needed    X   Pneumococcal vaccines    2017  Flu vaccine     X Hepatitis B vaccine (if at risk)   X   Shingles vaccine   X - declined   TDAP vaccine    4/2019 - abnormal, went to [address] [place] [address] for [address], referred to breast surgery. Has not yet gone  Mammogram     X Pap smear   X    Colorectal cancer screening      Low-dose CT for lung cancer screening    2017  Bone density test      Glaucoma screening    3/2019  Cholesterol test    3/2019  Diabetes screening test    Declined   Diabetes self-management class   Declined   Nutritionist referral for diabetes or renal disease     Discussion of Advance Directive   Discussed with Nigel Shepherd her ability to prepare and advance directive in the case that an injury or illness causes her to be unable to make health care decisions. Assessment/Plan   Z00.00    ICD-10-CM ICD-9-CM    1.  Medicare annual wellness visit, subsequent Z00.00 V70.0 CBC W/O DIFF METABOLIC PANEL, COMPREHENSIVE   2. Need for shingles vaccine Z23 V04.89 varicella-zoster recombinant, PF, (SHINGRIX, PF,) 50 mcg/0.5 mL susr injection   3. Need for pneumococcal vaccine Z23 V03.82 pneumococcal 13 meghan conj dip (PREVNAR-13) 0.5 mL syrg injection   4. Colon cancer screening Z12.11 V76.51 REFERRAL TO GASTROENTEROLOGY   5. Controlled type 2 diabetes mellitus without complication, without long-term current use of insulin (HCC) E11.9 250.00 HEMOGLOBIN A1C WITH EAG   6. Chest tightness R07.89 786.59 AMB POC EKG ROUTINE W/ 12 LEADS, INTER & REP   7. Urinary frequency R35.0 788.41        Orders Placed This Encounter    CBC W/O DIFF    METABOLIC PANEL, COMPREHENSIVE    HEMOGLOBIN A1C WITH EAG    [facility]    AMB POC EKG ROUTINE W/ 12 LEADS, INTER & REP    pneumococcal 13 meghan conj dip (PREVNAR-13) 0.5 mL syrg injection    varicella-zoster recombinant, PF, (SHINGRIX, PF,) 50 mcg/0.5 mL susr injection     Urinary symptoms - Resolved. Urology appt tomorrow. Chest tightness and cough - EKG personally interpreted, NSR with no ST abnormalities. Discussed likely not cardiac in etiology given onset with cough, reproducible on exam, but given risk factor of DMII in an elderly female, we disussed that we are unable to rule out cardiac etiology in the office and the only way to do that is for her to go to the ED for bloodwork and observation. She expressed understanding and declined ED, we discussed the risks of this and she expressed understanding. She will trial mucinex and flonase and RTC if no improvement. If symptoms worsen she will go to ED immediately.      DMII - Recheck A1c today     Pt seen and discussed with Dr. Emily Johnson (attending physician)    Luiz Andrea MD

## 2019-08-19 ENCOUNTER — OFFICE VISIT (OUTPATIENT)
Dept: FAMILY MEDICINE CLINIC | Age: 69
End: 2019-08-19

## 2019-08-19 VITALS
HEART RATE: 72 BPM | RESPIRATION RATE: 16 BRPM | SYSTOLIC BLOOD PRESSURE: 141 MMHG | OXYGEN SATURATION: 99 % | HEIGHT: 63 IN | BODY MASS INDEX: 25.52 KG/M2 | TEMPERATURE: 99.4 F | WEIGHT: 144 LBS | DIASTOLIC BLOOD PRESSURE: 87 MMHG

## 2019-08-19 DIAGNOSIS — Z23 NEED FOR PNEUMOCOCCAL VACCINE: ICD-10-CM

## 2019-08-19 DIAGNOSIS — E11.9 CONTROLLED TYPE 2 DIABETES MELLITUS WITHOUT COMPLICATION, WITHOUT LONG-TERM CURRENT USE OF INSULIN (HCC): ICD-10-CM

## 2019-08-19 DIAGNOSIS — Z23 NEED FOR SHINGLES VACCINE: ICD-10-CM

## 2019-08-19 DIAGNOSIS — R07.89 CHEST TIGHTNESS: ICD-10-CM

## 2019-08-19 DIAGNOSIS — Z00.00 MEDICARE ANNUAL WELLNESS VISIT, SUBSEQUENT: Primary | ICD-10-CM

## 2019-08-19 DIAGNOSIS — Z12.11 COLON CANCER SCREENING: ICD-10-CM

## 2019-08-19 DIAGNOSIS — R35.0 URINARY FREQUENCY: ICD-10-CM

## 2019-08-19 NOTE — PROGRESS NOTES
Chief Complaint   Patient presents with   [facility] Annual Wellness Visit     1. Have you been to the ER, urgent care clinic since your last visit? Hospitalized since your last visit? No    2. Have you seen or consulted any other health care providers outside of the [address] [place] [address] since your last visit? Include any pap smears or colon screening.  No

## 2019-08-19 NOTE — PATIENT INSTRUCTIONS
1. Call breast surgery to schedule your follow up visit  2. Take the scripts for pneumonia vaccine and shingles vaccine to the pharmacy  3. Call GI to set up an appt for colonoscopy  4. If your chest pain/tightness gets worse, go to the ED. 5. Follow up with Urology as planned   6. You will receive a phone call or letter with lab results       Discussed today Done Previously Not Needed    X   Pneumococcal vaccines    2017  Flu vaccine     X Hepatitis B vaccine (if at risk)   X   Shingles vaccine   X - declined   TDAP vaccine    4/2019 - abnormal, went to [facility] for [address], referred to breast surgery. Has not yet gone  Mammogram     X Pap smear   X    Colorectal cancer screening      Low-dose CT for lung cancer screening    2017  Bone density test      Glaucoma screening    3/2019  Cholesterol test    3/2019  Diabetes screening test    Declined   Diabetes self-management class   Declined   Nutritionist referral for diabetes or renal disease       Medicare Wellness Visit, Female     The best way to live healthy is to have a lifestyle where you eat a well-balanced diet, exercise regularly, limit alcohol use, and quit all forms of tobacco/nicotine, if applicable. Regular preventive services are another way to keep healthy. Preventive services (vaccines, screening tests, monitoring & exams) can help personalize your care plan, which helps you manage your own care. Screening tests can find health problems at the earliest stages, when they are easiest to treat. [facility] follows the current, evidence-based guidelines published by the [place] [employer] (USPSTF) when recommending preventive services for our patients. Because we follow these guidelines, sometimes recommendations change over time as research supports it. (For example, mammograms used to be recommended annually.  Even though Medicare will still pay for an annual mammogram, the newer guidelines recommend a mammogram every two years for women of average risk.)  Of course, you and your doctor may decide to screen more often for some diseases, based on your risk and your health status. Preventive services for you include:  - Medicare offers their members a free annual wellness visit, which is time for you and your primary care provider to discuss and plan for your preventive service needs. Take advantage of this benefit every year!  -All adults over the age of 72 should receive the recommended pneumonia vaccines. Current USPSTF guidelines recommend a series of two vaccines for the best pneumonia protection.   -All adults should have a flu vaccine yearly and a tetanus vaccine every 10 years. All adults age 61 and older should receive a shingles vaccine once in their lifetime.    -A bone mass density test is recommended when a woman turns 65 to screen for osteoporosis. This test is only recommended one time, as a screening. Some providers will use this same test as a disease monitoring tool if you already have osteoporosis. -All adults age 38-68 who are overweight should have a diabetes screening test once every three years.   -Other screening tests and preventive services for persons with diabetes include: an eye exam to screen for diabetic retinopathy, a kidney function test, a foot exam, and stricter control over your cholesterol.   -Cardiovascular screening for adults with routine risk involves an electrocardiogram (ECG) at intervals determined by your doctor.   -Colorectal cancer screenings should be done for adults age 54-65 with no increased risk factors for colorectal cancer. There are a number of acceptable methods of screening for this type of cancer. Each test has its own benefits and drawbacks. Discuss with your doctor what is most appropriate for you during your annual wellness visit. The different tests include: colonoscopy (considered the best screening method), a fecal occult blood test, a fecal DNA test, and sigmoidoscopy.   -Breast cancer screenings are recommended every other year for women of normal risk, age 54-69.  -Cervical cancer screenings for women over age 72 are only recommended with certain risk factors.   -All adults born between [facility] should be screened once for Hepatitis C. Here is a list of your current Health Maintenance items (your personalized list of preventive services) with a due date:  Health Maintenance Due   Topic Date Due    Eye Exam  08/15/1960    DTaP/Tdap/Td  (1 - Tdap) 08/15/1971    Shingles Vaccine (1 of 2) 08/15/2000    Stool testing for trace blood  08/15/2000    Glaucoma Screening   08/15/2015    Pneumococcal Vaccine (1 of 2 - PCV13) 08/15/2015    Annual Well Visit  05/18/2018    Diabetic Foot Care  06/07/2018    Mammogram  01/09/2019    Flu Vaccine  08/01/2019            Chest Pain: Care Instructions  Your Care Instructions    There are many things that can cause chest pain. Some are not serious and will get better on their own in a few days. But some kinds of chest pain need more testing and treatment. Your doctor may have recommended a follow-up visit in the next 8 to 12 hours. If you are not getting better, you may need more tests or treatment. Even though your doctor has released you, you still need to watch for any problems. The doctor carefully checked you, but sometimes problems can develop later. If you have new symptoms or if your symptoms do not get better, get medical care right away. If you have worse or different chest pain or pressure that lasts more than 5 minutes or you passed out (lost consciousness), call 911 or seek other emergency help right away. A medical visit is only one step in your treatment. Even if you feel better, you still need to do what your doctor recommends, such as going to all suggested follow-up appointments and taking medicines exactly as directed. This will help you recover and help prevent future problems.   How can you care for yourself at home?  · Rest until you feel better. · Take your medicine exactly as prescribed. Call your doctor if you think you are having a problem with your medicine. · Do not drive after taking a prescription pain medicine. When should you call for help? Call 911 if:    · You passed out (lost consciousness).     · You have severe difficulty breathing.     · You have symptoms of a heart attack. These may include:  ? Chest pain or pressure, or a strange feeling in your chest.  ? Sweating. ? Shortness of breath. ? Nausea or vomiting. ? Pain, pressure, or a strange feeling in your back, neck, jaw, or upper belly or in one or both shoulders or arms. ? Lightheadedness or sudden weakness. ? A fast or irregular heartbeat. After you call 911, the  may tell you to chew 1 adult-strength or 2 to 4 low-dose aspirin. Wait for an ambulance. Do not try to drive yourself.    Call your doctor today if:    · You have any trouble breathing.     · Your chest pain gets worse.     · You are dizzy or lightheaded, or you feel like you may faint.     · You are not getting better as expected.     · You are having new or different chest pain. Where can you learn more? Go to http://charles-kristin.info/. Enter A120 in the search box to learn more about \"Chest Pain: Care Instructions. \"  Current as of: September 23, 2018  Content Version: 12.1  © 1771-3612 [employer]. Care instructions adapted under license by [employer] (which disclaims liability or warranty for this information). If you have questions about a medical condition or this instruction, always ask your healthcare professional. [address] any warranty or liability for your use of this information.

## 2019-08-20 LAB
ALBUMIN SERPL-MCNC: 4.1 G/DL (ref 3.6–4.8)
ALBUMIN/GLOB SERPL: 1.8 {RATIO} (ref 1.2–2.2)
ALP SERPL-CCNC: 59 IU/L (ref 39–117)
ALT SERPL-CCNC: 12 IU/L (ref 0–32)
AST SERPL-CCNC: 15 IU/L (ref 0–40)
BILIRUB SERPL-MCNC: 0.8 MG/DL (ref 0–1.2)
BUN SERPL-MCNC: 14 MG/DL (ref 8–27)
BUN/CREAT SERPL: 16 (ref 12–28)
CALCIUM SERPL-MCNC: 9.7 MG/DL (ref 8.7–10.3)
CHLORIDE SERPL-SCNC: 104 MMOL/L (ref 96–106)
CO2 SERPL-SCNC: 24 MMOL/L (ref 20–29)
CREAT SERPL-MCNC: 0.86 MG/DL (ref 0.57–1)
ERYTHROCYTE [DISTWIDTH] IN BLOOD BY AUTOMATED COUNT: 12.5 % (ref 12.3–15.4)
EST. AVERAGE GLUCOSE BLD GHB EST-MCNC: 126 MG/DL
GLOBULIN SER CALC-MCNC: 2.3 G/DL (ref 1.5–4.5)
GLUCOSE SERPL-MCNC: 104 MG/DL (ref 65–99)
HBA1C MFR BLD: 6 % (ref 4.8–5.6)
HCT VFR BLD AUTO: 39 % (ref 34–46.6)
HGB BLD-MCNC: 12.9 G/DL (ref 11.1–15.9)
MCH RBC QN AUTO: 28.7 PG (ref 26.6–33)
MCHC RBC AUTO-ENTMCNC: 33.1 G/DL (ref 31.5–35.7)
MCV RBC AUTO: 87 FL (ref 79–97)
PLATELET # BLD AUTO: 224 X10E3/UL (ref 150–450)
POTASSIUM SERPL-SCNC: 4.1 MMOL/L (ref 3.5–5.2)
PROT SERPL-MCNC: 6.4 G/DL (ref 6–8.5)
RBC # BLD AUTO: 4.49 X10E6/UL (ref 3.77–5.28)
SODIUM SERPL-SCNC: 142 MMOL/L (ref 134–144)
WBC # BLD AUTO: 5.8 X10E3/UL (ref 3.4–10.8)

## 2019-08-30 DIAGNOSIS — E11.9 CONTROLLED TYPE 2 DIABETES MELLITUS WITHOUT COMPLICATION, WITHOUT LONG-TERM CURRENT USE OF INSULIN (HCC): ICD-10-CM

## 2019-08-30 DIAGNOSIS — I10 ESSENTIAL HYPERTENSION: ICD-10-CM

## 2019-08-30 RX ORDER — OLMESARTAN MEDOXOMIL AND HYDROCHLOROTHIAZIDE 40/12.5 40; 12.5 MG/1; MG/1
1 TABLET ORAL DAILY
Qty: 90 TAB | Refills: 3 | Status: SHIPPED | OUTPATIENT
Start: 2019-08-30 | End: 2020-10-20

## 2019-08-30 RX ORDER — METFORMIN HYDROCHLORIDE 500 MG/1
TABLET ORAL
Qty: 180 TAB | Refills: 1 | Status: SHIPPED | OUTPATIENT
Start: 2019-08-30 | End: 2020-10-20

## 2020-02-11 ENCOUNTER — OFFICE VISIT (OUTPATIENT)
Dept: FAMILY MEDICINE CLINIC | Age: 70
End: 2020-02-11

## 2020-02-11 VITALS
DIASTOLIC BLOOD PRESSURE: 91 MMHG | OXYGEN SATURATION: 99 % | SYSTOLIC BLOOD PRESSURE: 157 MMHG | HEIGHT: 63 IN | RESPIRATION RATE: 16 BRPM | BODY MASS INDEX: 25.34 KG/M2 | WEIGHT: 143 LBS | HEART RATE: 88 BPM | TEMPERATURE: 98.2 F

## 2020-02-11 DIAGNOSIS — R05.8 PRODUCTIVE COUGH: Primary | ICD-10-CM

## 2020-02-11 DIAGNOSIS — I10 ESSENTIAL HYPERTENSION: ICD-10-CM

## 2020-02-11 DIAGNOSIS — R68.89 FLU-LIKE SYMPTOMS: ICD-10-CM

## 2020-02-11 RX ORDER — GUAIFENESIN 600 MG/1
600 TABLET, EXTENDED RELEASE ORAL 2 TIMES DAILY
Qty: 30 TAB | Refills: 0 | Status: SHIPPED | OUTPATIENT
Start: 2020-02-11 | End: 2022-06-01

## 2020-02-11 RX ORDER — GUAIFENESIN 600 MG/1
600 TABLET, EXTENDED RELEASE ORAL 2 TIMES DAILY
Qty: 30 TAB | Refills: 0 | Status: SHIPPED | OUTPATIENT
Start: 2020-02-11 | End: 2020-02-11

## 2020-02-11 RX ORDER — DOXYCYCLINE 100 MG/1
100 TABLET ORAL 2 TIMES DAILY
Qty: 14 TAB | Refills: 0 | Status: SHIPPED | OUTPATIENT
Start: 2020-02-11 | End: 2020-02-14

## 2020-02-11 RX ORDER — DOXYCYCLINE 100 MG/1
100 TABLET ORAL 2 TIMES DAILY
Qty: 14 TAB | Refills: 0 | Status: SHIPPED | OUTPATIENT
Start: 2020-02-11 | End: 2020-02-11

## 2020-02-11 RX ORDER — FLUTICASONE PROPIONATE 50 MCG
2 SPRAY, SUSPENSION (ML) NASAL DAILY
Qty: 1 BOTTLE | Refills: 1 | Status: SHIPPED | OUTPATIENT
Start: 2020-02-11 | End: 2021-12-02 | Stop reason: SDUPTHER

## 2020-02-11 RX ORDER — FLUTICASONE PROPIONATE 50 MCG
2 SPRAY, SUSPENSION (ML) NASAL DAILY
Qty: 1 BOTTLE | Refills: 1 | Status: SHIPPED | OUTPATIENT
Start: 2020-02-11 | End: 2020-02-11

## 2020-02-11 NOTE — PROGRESS NOTES
Elizabeth Desir is a 71 y.o. female    Issues discussed today include:    Chief Complaint   Patient presents with    Nasal Congestion     chest congestion x 5 days,     Cough     cough x 5 days       1) Cough, congestion:  Started Thurs 2/6 with fatigue, body aches, cough and nasal congestion. Now feels like it has moved down to the chest. + productive cough, phlegm getting thicker. No hemoptysis. No wheezing. No fever. + chills at night. Has been taking sinus and cold preparation as well as OTC mucinex. Also taking claritin, but doesn't think it's doing anything. Decreased appetite until yesterday. No h/o COPD or asthma. No tobacco use. No known sick contacts. Pt says chest heaviness and dry cough mentioned at 8/2019 appt resolved completely for months and new chest congestion with this acute illness just started 5-6 days ago. She did not take her HTN medication this am. Denies CP, MANN, palpitations. Data reviewed or ordered today:       Other problems include:  Patient Active Problem List   Diagnosis Code    Essential hypertension I10    Controlled type 2 diabetes mellitus without complication, without long-term current use of insulin ([facility] [address] E11.9    Breast cancer, left ([facility] [address] C50.912    Osteopenia of multiple sites M85.89       Medications:  Current Outpatient Medications   Medication Sig Dispense Refill    doxycycline (ADOXA) 100 mg tablet Take 1 Tab by mouth two (2) times a day for 7 days. 14 Tab 0    fluticasone propionate (FLONASE) 50 mcg/actuation nasal spray 2 Sprays by Both Nostrils route daily. 1 Bottle 1    guaiFENesin ER (MUCINEX) 600 mg ER tablet Take 1 Tab by mouth two (2) times a day. 30 Tab 0    metFORMIN (GLUCOPHAGE) 500 mg tablet TAKE 1 TABLET BY MOUTH TWICE A DAY WITH MEALS 180 Tab 1    olmesartan-hydroCHLOROthiazide (BENICAR HCT) 40-12.5 mg per tablet Take 1 Tab by mouth daily. 90 Tab 3    cholecalciferol (VITAMIN D3) 1,000 unit cap Take  by mouth daily.       loratadine (CLARITIN) 10 mg tablet Take 1 Tab by mouth daily. 30 Tab 1       Allergies: Allergies   Allergen Reactions    Penicillins Rash and Swelling       LMP:  No LMP recorded. Patient has had a hysterectomy.     Social History     Socioeconomic History    Marital status:      Spouse name: Not on file    Number of children: Not on file    Years of education: Not on file    Highest education level: Not on file   Occupational History    Not on file   Social Needs    Financial resource strain: Not on file    Food insecurity:     Worry: Not on file     Inability: Not on file    Transportation needs:     Medical: Not on file     Non-medical: Not on file   Tobacco Use    Smoking status: Never Smoker    Smokeless tobacco: Never Used   Substance and Sexual Activity    Alcohol use: No    Drug use: No    Sexual activity: Not Currently     Birth control/protection: Surgical   Lifestyle    Physical activity:     Days per week: Not on file     Minutes per session: Not on file    Stress: Not on file   Relationships    Social connections:     Talks on phone: Not on file     Gets together: Not on file     Attends Gnosticist service: Not on file     Active member of club or organization: Not on file     Attends meetings of clubs or organizations: Not on file     Relationship status: Not on file    Intimate partner violence:     Fear of current or ex partner: Not on file     Emotionally abused: Not on file     Physically abused: Not on file     Forced sexual activity: Not on file   Other Topics Concern    Not on file   Social History Narrative    Not on file       Family History   Problem Relation Age of Onset    Bleeding Prob Mother         Anyerism- Brain    Diabetes Father     Other Father         hep C    Stroke Sister     No Known Problems Brother     No Known Problems Brother     No Known Problems Brother     No Known Problems Brother     No Known Problems Brother     Lupus Sister     No Known Problems Sister     No Known Problems Sister     No Known Problems Sister     Migraines Daughter     No Known Problems Daughter          Physical Exam   Visit Vitals  BP (!) 157/91 (BP 1 Location: Right arm, BP Patient Position: Sitting)   Pulse 88   Temp 98.2 °F (36.8 °C) (Oral)   Resp 16   Ht 5' 3\" (1.6 m)   Wt 143 lb (64.9 kg)   SpO2 99%   BMI 25.33 kg/m²      BP Readings from Last 3 Encounters:   02/11/20 (!) 157/91   08/19/19 141/87   08/09/19 (!) 173/98     Constitutional: Appears well,  No acute distress, Vitals noted  Psychiatric:  Affect normal, Alert and Oriented to person/place/time  Eyes:  Conjunctiva clear, no drainage  ENT:  External ears and nose normal, Mucous membranes moist. TMs grey and non-bulging bilaterally. OP without erythema, edema, + copious clear exudate. Bilateral nares with scant clear drainage, no edema or polyps. Neck:  General inspection normal. Supple. + tender 1.5cm L submandibular lymph node, no enlarged or tender ant/post cervical lymphadenopathy  Heart:  Normal HR, Normal S1 and S2,  Regular rhythm. No murmurs, rubs or gallops. Lungs:  Clear to auscultation, good respiratory effort, no wheezes, rales or rhonchi  Extremities: Without edema, good peripheral pulses  Skin:  Warm to palpation, without rashes      Assessment/Plan:      ICD-10-CM ICD-9-CM    1. Productive cough R05 786.2    2. Flu-like symptoms R68.89 780.99    3. Essential hypertension I10 401.9      Acute flu like sxs, day 6 of illness. + fatigue and productive cough. No fever. Symptomatic tx for now with flonase, mucinex  If not better in 3 days or if worsening, should fill rx for doxycycline to cover for possible bacterial superinfection  RTC if not improving with therapy or any CP, SOB, etc  Instructed to resume HTN med  Fu in 2-4 weeks for HTN and well controlled DM      Follow-up and Dispositions    · Return if symptoms worsen or fail to improve.          Blanca Goodwin MD

## 2020-02-11 NOTE — PROGRESS NOTES
Chief Complaint   Patient presents with    Nasal Congestion     chest congestion x 5 days,     Cough     cough x 5 days     1. Have you been to the ER, urgent care clinic since your last visit? Hospitalized since your last visit? No    2. Have you seen or consulted any other health care providers outside of the [address] [place] [address] since your last visit? Include any pap smears or colon screening. No     Reviewed record in preparation for visit and have obtained necessary documentation.

## 2020-02-13 ENCOUNTER — TELEPHONE (OUTPATIENT)
Dept: FAMILY MEDICINE CLINIC | Age: 70
End: 2020-02-13

## 2020-02-13 NOTE — TELEPHONE ENCOUNTER
Patient states she started medication today and vomited it back up. Asking if alternative can be sent in for her.       Call/  Erik Sahu (Self) [phone] Kofi Penaloza)

## 2020-02-14 RX ORDER — AZITHROMYCIN 250 MG/1
TABLET, FILM COATED ORAL
Qty: 6 TAB | Refills: 0 | Status: SHIPPED | OUTPATIENT
Start: 2020-02-14 | End: 2020-02-19

## 2020-02-14 NOTE — TELEPHONE ENCOUNTER
I called and spoke to pt to clarify which medication was causing problem. She says she had nausea and vomiting after first dose of doxycycline yesterday am. She had not taken any further doses. She feels better now. Denies rash or throat swelling, difficultly breathing. I advised her not to take doxy. I will send in prescription for azithromycin. She recalls having this before and tolerating it just fine. She requests med be sen t to [facility].

## 2020-04-09 ENCOUNTER — VIRTUAL VISIT (OUTPATIENT)
Dept: FAMILY MEDICINE CLINIC | Age: 70
End: 2020-04-09

## 2020-04-09 DIAGNOSIS — M70.61 TROCHANTERIC BURSITIS OF RIGHT HIP: Primary | ICD-10-CM

## 2020-04-09 NOTE — PROGRESS NOTES
Bernard Fortune  71 y.o. female  1950  [address]  [facility] [address]  [id number]    [phone] (home)      [address]:    Telephone Encounter  Graciela Camp MD       Encounter Date: 4/9/2020 at 10:50 AM    Consent:  She and/or the health care decision maker is aware that that she may receive a bill for this telephone service, depending on her insurance coverage, and has provided verbal consent to proceed: Yes    Chief Complaint   Patient presents with    Hip Pain     R-sided       History of Present Illness   Bernard Fortune is a 71 y.o. female was evaluated by telephone. I communicated with the patient about her R ship. Pt c/o a 2-3wks of worsening of her chronic R hip pain. The pain is mostly around the R buttocks and and lateral aspect of her R hip. She rates the pain at 8-9/10 severity. Pain is worse when pressing on the R hip or laying on her side. It is also worsened by prolonged periods of standing. Shehas tried tylenol and applying heat/cold to the area but that has provided minimal relief. No numbness/tingling/focal weakness. No fevers/chills. She has had trochanteric bursitis in the past and received \"an injection 2 years ago which helped a lot\". She denies any recent trauma/falls    Review of Systems   Review of Systems   Constitutional: Negative for chills and fever. Musculoskeletal: Positive for joint pain. Negative for back pain and falls. Skin: Negative for rash. Neurological: Negative for tingling and weakness. Vitals/Objective:   General: Patient speaking in complete sentences without effort. Normal speech and cooperative. Due to this being a Virtual Check-in/Telephone evaluation, many elements of the physical examination are unable to be assessed.     Assessment and Plan:   Time-based coding, delete if not needed: I spent at least 15 minutes with this established patient, and >50% of the time was spent counseling and/or coordinating care regarding R hip pain  Total Time: minutes: 11-20 minutes    1. Trochanteric bursitis of right hip  Advised pt that her sx are likely a recurrence of R trochanteric bursitis based on the hx provided. - Switch tylenol to NSAIDs. Pt has ibuprofen at home. She may take 400mg up to 4 times daily as needed for pain (take w/ meals). Cautioned about risks of GI bleed/ulcer.   - Pt to schedule office visit for evaluation and management; she may benefit from steroid injection        We discussed the expected course, resolution and complications of the diagnosis(es) in detail. Medication risks, benefits, costs, interactions, and alternatives were discussed as indicated. I advised her to contact the office if her condition worsens, changes or fails to improve as anticipated. She expressed understanding with the diagnosis(es) and plan. Patient understands that this encounter was a temporary measure, and the importance of further follow up and examination was emphasized. Patient verbalized understanding. Patient informed to follow up: w/ office visit for further management    I affirm this is a Patient Initiated Episode with an Established Patient who has not had a related appointment within my department in the past 7 days or scheduled within the next 24 hours. Note: not billable if this call serves to triage the patient into an appointment for the relevant concern    Pt discussed w/ Dr. Katia Mcbride, Family Medicine Attending    Electronically Signed: Janes Smith MD, Family Medicine Resident  Providers location when delivering service: home      Medicare:  [address]      ICD-10-CM ICD-9-CM    1.  Trochanteric bursitis of right hip M70.61 726.5        Pursuant to the emergency declaration under the [facility] waiver authority and the [employer] Act, this Virtual  Visit was conducted, with patient's consent, to reduce the patient's risk of exposure to COVID-19 and provide continuity of care for an established patient. History   Patients past medical, surgical and family histories were personally reviewed and updated.       Past Medical History:   Diagnosis Date    Diabetes ([facility] [address]     Gallstone     Hypertension     Invasive carcinoma of breast ([facility] [address] 1/31/2017     Past Surgical History:   Procedure Laterality Date    HX GYN  1984    hysterectomy - Fibroids     Family History   Problem Relation Age of Onset    Bleeding Prob Mother         Anyerism- Brain    Diabetes Father     Other Father         hep C    Stroke Sister     No Known Problems Brother     No Known Problems Brother     No Known Problems Brother     No Known Problems Brother     No Known Problems Brother     Lupus Sister     No Known Problems Sister     No Known Problems Sister     No Known Problems Sister    Agustín Loser Migraines Daughter     No Known Problems Daughter      Social History     Socioeconomic History    Marital status:      Spouse name: Not on file    Number of children: Not on file    Years of education: Not on file    Highest education level: Not on file   Occupational History    Not on file   Social Needs    Financial resource strain: Not on file    Food insecurity     Worry: Not on file     Inability: Not on file    Transportation needs     Medical: Not on file     Non-medical: Not on file   Tobacco Use    Smoking status: Never Smoker    Smokeless tobacco: Never Used   Substance and Sexual Activity    Alcohol use: No    Drug use: No    Sexual activity: Not Currently     Birth control/protection: Surgical   Lifestyle    Physical activity     Days per week: Not on file     Minutes per session: Not on file    Stress: Not on file   Relationships    Social connections     Talks on phone: Not on file     Gets together: Not on file     Attends Amish service: Not on file     Active member of club or organization: Not on file     Attends meetings of clubs or organizations: Not on file     Relationship status: Not on file    Intimate partner violence     Fear of current or ex partner: Not on file     Emotionally abused: Not on file     Physically abused: Not on file     Forced sexual activity: Not on file   Other Topics Concern    Not on file   Social History Narrative    Not on file            Current Medications/Allergies   Medications and Allergies reviewed:    Current Outpatient Medications   Medication Sig Dispense Refill    fluticasone propionate (FLONASE) 50 mcg/actuation nasal spray 2 Sprays by Both Nostrils route daily. 1 Bottle 1    guaiFENesin ER (MUCINEX) 600 mg ER tablet Take 1 Tab by mouth two (2) times a day. 30 Tab 0    metFORMIN (GLUCOPHAGE) 500 mg tablet TAKE 1 TABLET BY MOUTH TWICE A DAY WITH MEALS 180 Tab 1    olmesartan-hydroCHLOROthiazide (BENICAR HCT) 40-12.5 mg per tablet Take 1 Tab by mouth daily. 90 Tab 3    cholecalciferol (VITAMIN D3) 1,000 unit cap Take  by mouth daily.  loratadine (CLARITIN) 10 mg tablet Take 1 Tab by mouth daily.  30 Tab 1     Allergies   Allergen Reactions    Doxycycline Nausea and Vomiting    Penicillins Rash and Swelling

## 2020-04-10 ENCOUNTER — TELEPHONE (OUTPATIENT)
Dept: FAMILY MEDICINE CLINIC | Age: 70
End: 2020-04-10

## 2020-04-10 ENCOUNTER — DOCUMENTATION ONLY (OUTPATIENT)
Dept: FAMILY MEDICINE CLINIC | Age: 70
End: 2020-04-10

## 2020-04-10 NOTE — PROGRESS NOTES
I called [facility] to touched base with them with regards to their routine/non-urgent scheduled visit. I asked if there are any pertinent issues or medication refills that were needed. Saw patient had virtual visit for hip bursitis yesterday. Scheduled appt for hip injection today. Has not tried exercises or NSAIDs. Recommend she do conservative management at least 1-2 weeks before coming in for injection to minimize possible exposure. She was agreeable to this. Discussed exercises over the phone and also gave her a printed copy that will be mailed in the form of a letter. Discussed if she is still having problems after doing exercises regularly to call us back. Patient agreeable to plan. Patient understands that this encounter was a temporary measure, and the importance of further follow up and examination was emphasized. Patient verbalized understanding. Daniel Burt, DO    At this time please cancel patient's appt.

## 2020-04-23 NOTE — PROGRESS NOTES
[address] Medicine Residency Attending Addendum:  Dr. Abigail Peoples MD,  the patient and I were not physically present during this encounter. The resident and I are concurrently monitoring the patient care through appropriate telecommunication technology. I discussed the findings, assessment and plan with the resident and agree with the resident's findings and plan as documented in the resident's note.       Jolie Castro MD

## 2020-10-21 ENCOUNTER — TELEPHONE (OUTPATIENT)
Dept: FAMILY MEDICINE CLINIC | Age: 70
End: 2020-10-21

## 2020-10-21 NOTE — TELEPHONE ENCOUNTER
[facility] per below notes. If patient calls back, please schedule appt. thanks            Mireya Fowler MD  fp Front Office 20 hours ago (5:52 PM)       Please call to schedule lab appointment followed by virtual. Elda Raj let her know I have also placed an order for a follow-up mammogram for which she should be expecting a call from the mammography center to schedule.      Routing comment

## 2020-12-16 ENCOUNTER — OFFICE VISIT (OUTPATIENT)
Dept: PRIMARY CARE CLINIC | Age: 70
End: 2020-12-16
Payer: MEDICARE

## 2020-12-16 VITALS
SYSTOLIC BLOOD PRESSURE: 120 MMHG | OXYGEN SATURATION: 98 % | HEART RATE: 75 BPM | DIASTOLIC BLOOD PRESSURE: 78 MMHG | TEMPERATURE: 98.2 F

## 2020-12-16 DIAGNOSIS — Z13.83 SCREENING FOR CARDIOVASCULAR, RESPIRATORY, AND GENITOURINARY DISEASES: Primary | ICD-10-CM

## 2020-12-16 DIAGNOSIS — Z13.6 SCREENING FOR CARDIOVASCULAR, RESPIRATORY, AND GENITOURINARY DISEASES: Primary | ICD-10-CM

## 2020-12-16 DIAGNOSIS — Z13.89 SCREENING FOR CARDIOVASCULAR, RESPIRATORY, AND GENITOURINARY DISEASES: Primary | ICD-10-CM

## 2020-12-16 DIAGNOSIS — Z20.822 EXPOSURE TO 2019 NOVEL CORONAVIRUS: ICD-10-CM

## 2020-12-16 PROCEDURE — 99213 OFFICE O/P EST LOW 20 MIN: CPT | Performed by: NURSE PRACTITIONER

## 2020-12-16 NOTE — PROGRESS NOTES
Obed Mccormack is a 79 y.o. female who was seen in clinic today (12/16/2020) for an acute visit. Assessment/Plan:            * COVID-19 sample collected and submitted       * Patient given detailed CDC instructions contained within After Visit Summary    Diagnoses and all orders for this visit:    1. Screening for cardiovascular, respiratory, and genitourinary diseases  -     NOVEL CORONAVIRUS (COVID-19)    2. Exposure to 2019 novel coronavirus       known covid exposure. Discussed expected course/resolution/complications of diagnosis in detail with patient. Advised pt on CDC guidance, OTC medications for symptom management, red flags reviewed and should develop to seek emergency medical attn. Reviewed with her that COVID-19 pandemic is an evolving situation with rapidly changing recommendations & guidelines, continue to practice hand hygiene, social distancing, wearing of facial coverings. Regardless of testing results, should still follow CDC guidelines as there is a chance of a false negative, such as a poor sample collection or being too soon to test after exposure. Medical decisions are made based on the the best information available at the time. Recommended she stay tuned for updates published by trusted sources and to advise your PCP of any unexpected changes in clinical condition     Subjective:   Janice Adhikari was seen today for Concern For COVID-19 (Coronavirus)    Exposure occurred approx yesterday, with a few exposures over the weekend. She denies a recent history/current: loss of smell/taste, cough, fever, wheezing, SOB, and MANN. Non user of tob. Travel Screening     Question   Response    In the last month, have you been in contact with someone who was confirmed or suspected to have Coronavirus / COVID-19? Yes    Have you had a COVID-19 viral test in the last 14 days? Do you have any of the following new or worsening symptoms?   None of these    Have you traveled internationally in the last month? No      Travel History   Travel since 11/16/20     No documented travel since 11/16/20          ROS - Pertinent items are noted in HPI    Patient Active Problem List   Diagnosis Code    Essential hypertension I10    Controlled type 2 diabetes mellitus without complication, without long-term current use of insulin ([facility] [address] E11.9    Breast cancer, left ([facility] [address] C50.912    Osteopenia of multiple sites M85.89     Home Medications    Medication Sig Start Date End Date Taking? Authorizing Provider   metFORMIN (GLUCOPHAGE) 500 mg tablet TAKE 1 TABLET BY MOUTH TWICE A DAY WITH MEALS 10/20/20   Jose Angel Jones MD   olmesartan-hydroCHLOROthiazide [facility] HCT) 40-12.5 mg per tablet TAKE 1 TABLET EVERY DAY 10/20/20   Jose Angel Jones MD   fluticasone propionate (FLONASE) 50 mcg/actuation nasal spray 2 Sprays by Both Nostrils route daily. 2/11/20   Mojgan Reece MD   guaiFENesin ER (MUCINEX) 600 mg ER tablet Take 1 Tab by mouth two (2) times a day. 2/11/20   Mojgan Reece MD   cholecalciferol (VITAMIN D3) 1,000 unit cap Take  by mouth daily. Provider, Historical   loratadine (CLARITIN) 10 mg tablet Take 1 Tab by mouth daily. 8/8/17   Kushal Sanders MD      Allergies   Allergen Reactions    Doxycycline Nausea and Vomiting    Penicillins Rash and Swelling          Objective:   Physical Exam  General:  alert, cooperative, no distress   Ears: Normal external ear canals AU   Sinuses: Normal paranasal sinuses without tenderness   Neck: supple, symmetrical, trachea midline. Heart: normal rate, regular rhythm   Lungs: No dyspneic or audible respiratory distress. Visit Vitals  /78 (BP 1 Location: Left arm, BP Patient Position: Sitting)   Pulse 75   Temp 98.2 °F (36.8 °C)   SpO2 98%         Disclaimer:        Medication risks/benefits/costs/interactions/alternatives discussed with patient.   She was given an after visit summary which includes diagnoses, current medications, & vitals. She expressed understanding with the diagnosis and plan. Aspects of this note may have been generated using voice recognition software. Despite editing, there may be some syntax errors.        Kasandra Schultz NP Otc Regimen: Topical lamisil twice a day for 3 weeks. Detail Level: Zone Discontinue Regimen: Clotrimazole Plan: Patient to call if no improvement after 2 weeks and we I’ll switch her to Lamisil  oral.\\nLab slip given to patient

## 2020-12-17 DIAGNOSIS — I10 ESSENTIAL HYPERTENSION: ICD-10-CM

## 2020-12-17 DIAGNOSIS — E11.9 CONTROLLED TYPE 2 DIABETES MELLITUS WITHOUT COMPLICATION, WITHOUT LONG-TERM CURRENT USE OF INSULIN (HCC): ICD-10-CM

## 2020-12-19 LAB — SARS-COV-2, NAA: NOT DETECTED

## 2020-12-21 ENCOUNTER — TELEPHONE (OUTPATIENT)
Dept: PRIMARY CARE CLINIC | Age: 70
End: 2020-12-21

## 2020-12-22 RX ORDER — METFORMIN HYDROCHLORIDE 500 MG/1
TABLET ORAL
Qty: 180 TAB | Refills: 0 | Status: SHIPPED | OUTPATIENT
Start: 2020-12-22 | End: 2021-03-30

## 2020-12-22 RX ORDER — OLMESARTAN MEDOXOMIL AND HYDROCHLOROTHIAZIDE 40/12.5 40; 12.5 MG/1; MG/1
TABLET ORAL
Qty: 90 TAB | Refills: 0 | Status: SHIPPED | OUTPATIENT
Start: 2020-12-22 | End: 2021-03-30

## 2021-02-15 ENCOUNTER — TELEPHONE (OUTPATIENT)
Dept: FAMILY MEDICINE CLINIC | Age: 71
End: 2021-02-15

## 2021-02-15 NOTE — TELEPHONE ENCOUNTER
----- Message from Gianna Corona sent at 2/8/2021  2:50 PM EST -----  Regarding: Dr. Minaya Thayer telephone  General Message/Vendor Calls    Caller's first and last name: pt       Reason for call: pt would like to know if provider thinks its okay for her to get the covid vaccine. Pt states they are giving them out at her job at [employer].        Callback required yes/no and why: yes       Best contact number(s): [phone]      Details to clarify the request: nbrunilda Corona

## 2021-02-17 ENCOUNTER — VIRTUAL VISIT (OUTPATIENT)
Dept: FAMILY MEDICINE CLINIC | Age: 71
End: 2021-02-17
Payer: MEDICARE

## 2021-02-17 DIAGNOSIS — Z71.9 HEALTH COUNSELING: Primary | ICD-10-CM

## 2021-02-17 PROCEDURE — 99213 OFFICE O/P EST LOW 20 MIN: CPT | Performed by: STUDENT IN AN ORGANIZED HEALTH CARE EDUCATION/TRAINING PROGRAM

## 2021-02-17 PROCEDURE — G8428 CUR MEDS NOT DOCUMENT: HCPCS | Performed by: STUDENT IN AN ORGANIZED HEALTH CARE EDUCATION/TRAINING PROGRAM

## 2021-02-17 PROCEDURE — G8756 NO BP MEASURE DOC: HCPCS | Performed by: STUDENT IN AN ORGANIZED HEALTH CARE EDUCATION/TRAINING PROGRAM

## 2021-02-17 PROCEDURE — 3017F COLORECTAL CA SCREEN DOC REV: CPT | Performed by: STUDENT IN AN ORGANIZED HEALTH CARE EDUCATION/TRAINING PROGRAM

## 2021-02-17 PROCEDURE — G8399 PT W/DXA RESULTS DOCUMENT: HCPCS | Performed by: STUDENT IN AN ORGANIZED HEALTH CARE EDUCATION/TRAINING PROGRAM

## 2021-02-17 PROCEDURE — G8432 DEP SCR NOT DOC, RNG: HCPCS | Performed by: STUDENT IN AN ORGANIZED HEALTH CARE EDUCATION/TRAINING PROGRAM

## 2021-02-17 PROCEDURE — 1101F PT FALLS ASSESS-DOCD LE1/YR: CPT | Performed by: STUDENT IN AN ORGANIZED HEALTH CARE EDUCATION/TRAINING PROGRAM

## 2021-02-17 PROCEDURE — 1090F PRES/ABSN URINE INCON ASSESS: CPT | Performed by: STUDENT IN AN ORGANIZED HEALTH CARE EDUCATION/TRAINING PROGRAM

## 2021-02-17 NOTE — PROGRESS NOTES
Rajan Boone  79 y.o. female  1950  [address]  [facility] [address]  [phone]   [address]:    Telemedicine Progress Note  Vanessa Galeano DO       Encounter Date and Time: February 17, 2021 at 10:34 AM    Consent: Rajan Boone, who was seen by synchronous (real-time) audio-video technology, and/or her healthcare decision maker, is aware that this patient-initiated, Telehealth encounter on 2/17/2021 is a billable service, with coverage as determined by her insurance carrier. She is aware that she may receive a bill and has provided verbal consent to proceed: Yes. Chief Complaint   Patient presents with    Medication Evaluation     History of Present Illness   Rajan Boone is a 79 y.o. female was evaluated by synchronous (real-time) audio-video technology from home, through a secure patient portal.    Patient is requesting information on the COVID vaccine. Her job is offering the vaccine in the future and she is wondering if she should get it since she is a diabetic. Review of Systems   Review of Systems   Constitutional: Negative for chills and fever. Respiratory: Negative for cough and shortness of breath. Cardiovascular: Negative for chest pain. Gastrointestinal: Negative for abdominal pain, nausea and vomiting. Neurological: Negative for dizziness and headaches. Psychiatric/Behavioral: Negative for depression and substance abuse. Vitals/Objective:     General: alert, cooperative, no distress   Mental  status: mental status: alert, oriented to person, place, and time, normal mood, behavior, speech, dress, motor activity, and thought processes   Resp: resp: normal effort and no respiratory distress   Neuro: neuro: no gross deficits   Skin: skin: no discoloration or lesions of concern on visible areas   Due to this being a TeleHealth evaluation, many elements of the physical examination are unable to be assessed.       Assessment and Plan: 79 y.o. F with a PMH of HTN, DM2, and hx of breast cancer presenting for vaccine information and counseling. 1. Health counseling  -Counseled patient that her current comorbidities would not effect her ability to receive the vaccination.  - Recommended her to get the vaccination ASAP when available at her employer  -Discussed the reported side effects associated with the vaccination  -Patient encouraged to follow up for lab work ordered in 10/2020        Time spent in direct conversation with the patient to include medical condition(s) discussed, assessment and treatment plan:       We discussed the expected course, resolution and complications of the diagnosis(es) in detail. Medication risks, benefits, costs, interactions, and alternatives were discussed as indicated. I advised her to contact the office if her condition worsens, changes or fails to improve as anticipated. She expressed understanding with the diagnosis(es) and plan. Patient understands that this encounter was a temporary measure, and the importance of further follow up and examination was emphasized. Patient verbalized understanding. Patient informed to follow up: Follow-up and Dispositions  ·   Return for lab work at earliest convenience. Electronically Signed: Mehrdad Phoenix DO    CPT Codes 89587-[address]181 for Established Patients may apply to this Telehealth Visit. POS code: 18. Modifier GT    Sky Yee is a 79 y.o. female who was evaluated by an audio-video encounter for concerns as above. Patient identification was verified prior to start of the visit. A caregiver was present when appropriate. Due to this being a TeleHealth encounter ([facility]), evaluation of the following organ systems was limited: Vitals/Constitutional/EENT/Resp/CV/GI//MS/Neuro/Skin/Heme-Lymph-Imm.   Pursuant to the emergency declaration under the [facility][note 3][facility], 9852 waiver authority and the [employer] Act, this Virtual Visit was conducted, with patient's (and/or legal guardian's) consent, to reduce the patient's risk of exposure to COVID-19 and provide necessary medical care. Services were provided through a synchronous discussion virtually to substitute for in-person clinic visit. I was at home. The patient was at home. History   Patients past medical, surgical and family histories were reviewed and updated.       Past Medical History:   Diagnosis Date    Diabetes ([facility]     Gallstone     Hypertension     Invasive carcinoma of breast ([facility] 1/31/2017     Past Surgical History:   Procedure Laterality Date    HX GYN  1984    hysterectomy - Fibroids     Family History   Problem Relation Age of Onset    Bleeding Prob Mother         Anyerism- Brain    Diabetes Father     Other Father         hep C    Stroke Sister     No Known Problems Brother     No Known Problems Brother     No Known Problems Brother     No Known Problems Brother     No Known Problems Brother     Lupus Sister     No Known Problems Sister     No Known Problems Sister     No Known Problems Sister    [facility] Daughter     No Known Problems Daughter      Social History     Tobacco Use    Smoking status: Never Smoker    Smokeless tobacco: Never Used   Substance Use Topics    Alcohol use: No    Drug use: No     Patient Active Problem List   Diagnosis Code    Essential hypertension I10    Controlled type 2 diabetes mellitus without complication, without long-term current use of insulin ([facility] E11.9    Breast cancer, left ([facility] C50.912    Osteopenia of multiple sites M85.89          Current Medications/Allergies   Medications and Allergies reviewed:    Current Outpatient Medications   Medication Sig Dispense Refill    metFORMIN (GLUCOPHAGE) 500 mg tablet TAKE 1 TABLET BY MOUTH TWICE A DAY WITH MEALS 180 Tab 0    olmesartan-hydroCHLOROthiazide (BENICAR HCT) 40-12.5 mg per tablet TAKE 1 TABLET EVERY DAY 90 Tab 0    fluticasone propionate (FLONASE) 50 mcg/actuation nasal spray 2 Sprays by Both Nostrils route daily. 1 Bottle 1    guaiFENesin ER (MUCINEX) 600 mg ER tablet Take 1 Tab by mouth two (2) times a day. 30 Tab 0    cholecalciferol (VITAMIN D3) 1,000 unit cap Take  by mouth daily.  loratadine (CLARITIN) 10 mg tablet Take 1 Tab by mouth daily.  30 Tab 1     Allergies   Allergen Reactions    Doxycycline Nausea and Vomiting    Penicillins Rash and Swelling

## 2021-02-17 NOTE — PROGRESS NOTES
[address] Medicine Residency Attending Addendum:  Dr. Richie Portillo, DO,  the patient and I were not physically present during this encounter. The resident and I are concurrently monitoring the patient care through appropriate telecommunication technology. I discussed the findings, assessment and plan with the resident and agree with the resident's findings and plan as documented in the resident's note.       Kim Michaud MD

## 2021-02-25 ENCOUNTER — LAB ONLY (OUTPATIENT)
Dept: FAMILY MEDICINE CLINIC | Age: 71
End: 2021-02-25

## 2021-02-25 DIAGNOSIS — I10 ESSENTIAL HYPERTENSION: ICD-10-CM

## 2021-02-25 DIAGNOSIS — E11.9 CONTROLLED TYPE 2 DIABETES MELLITUS WITHOUT COMPLICATION, WITHOUT LONG-TERM CURRENT USE OF INSULIN (HCC): ICD-10-CM

## 2021-02-25 LAB
ALBUMIN SERPL-MCNC: 3.9 G/DL (ref 3.5–5)
ALBUMIN/GLOB SERPL: 1.3 {RATIO} (ref 1.1–2.2)
ALP SERPL-CCNC: 63 U/L (ref 45–117)
ALT SERPL-CCNC: 14 U/L (ref 12–78)
ANION GAP SERPL CALC-SCNC: 4 MMOL/L (ref 5–15)
AST SERPL-CCNC: 11 U/L (ref 15–37)
BASOPHILS # BLD: 0 K/UL (ref 0–0.1)
BASOPHILS NFR BLD: 1 % (ref 0–1)
BILIRUB SERPL-MCNC: 0.6 MG/DL (ref 0.2–1)
BUN SERPL-MCNC: 17 MG/DL (ref 6–20)
BUN/CREAT SERPL: 21 (ref 12–20)
CALCIUM SERPL-MCNC: 9.2 MG/DL (ref 8.5–10.1)
CHLORIDE SERPL-SCNC: 106 MMOL/L (ref 97–108)
CHOLEST SERPL-MCNC: 172 MG/DL
CO2 SERPL-SCNC: 29 MMOL/L (ref 21–32)
CREAT SERPL-MCNC: 0.8 MG/DL (ref 0.55–1.02)
CREAT UR-MCNC: 126 MG/DL
DIFFERENTIAL METHOD BLD: NORMAL
EOSINOPHIL # BLD: 0 K/UL (ref 0–0.4)
EOSINOPHIL NFR BLD: 1 % (ref 0–7)
ERYTHROCYTE [DISTWIDTH] IN BLOOD BY AUTOMATED COUNT: 12.6 % (ref 11.5–14.5)
EST. AVERAGE GLUCOSE BLD GHB EST-MCNC: 126 MG/DL
GLOBULIN SER CALC-MCNC: 2.9 G/DL (ref 2–4)
GLUCOSE SERPL-MCNC: 115 MG/DL (ref 65–100)
HBA1C MFR BLD: 6 % (ref 4–5.6)
HCT VFR BLD AUTO: 39.1 % (ref 35–47)
HDLC SERPL-MCNC: 77 MG/DL
HDLC SERPL: 2.2 {RATIO} (ref 0–5)
HGB BLD-MCNC: 12.4 G/DL (ref 11.5–16)
IMM GRANULOCYTES # BLD AUTO: 0 K/UL (ref 0–0.04)
IMM GRANULOCYTES NFR BLD AUTO: 0 % (ref 0–0.5)
LDLC SERPL CALC-MCNC: 83.2 MG/DL (ref 0–100)
LIPID PROFILE,FLP: NORMAL
LYMPHOCYTES # BLD: 2.2 K/UL (ref 0.8–3.5)
LYMPHOCYTES NFR BLD: 36 % (ref 12–49)
MCH RBC QN AUTO: 29.1 PG (ref 26–34)
MCHC RBC AUTO-ENTMCNC: 31.7 G/DL (ref 30–36.5)
MCV RBC AUTO: 91.8 FL (ref 80–99)
MICROALBUMIN UR-MCNC: 1.12 MG/DL
MICROALBUMIN/CREAT UR-RTO: 9 MG/G (ref 0–30)
MONOCYTES # BLD: 0.5 K/UL (ref 0–1)
MONOCYTES NFR BLD: 8 % (ref 5–13)
NEUTS SEG # BLD: 3.3 K/UL (ref 1.8–8)
NEUTS SEG NFR BLD: 54 % (ref 32–75)
NRBC # BLD: 0 K/UL (ref 0–0.01)
NRBC BLD-RTO: 0 PER 100 WBC
PLATELET # BLD AUTO: 216 K/UL (ref 150–400)
PMV BLD AUTO: 10.3 FL (ref 8.9–12.9)
POTASSIUM SERPL-SCNC: 3.8 MMOL/L (ref 3.5–5.1)
PROT SERPL-MCNC: 6.8 G/DL (ref 6.4–8.2)
RBC # BLD AUTO: 4.26 M/UL (ref 3.8–5.2)
SODIUM SERPL-SCNC: 139 MMOL/L (ref 136–145)
TRIGL SERPL-MCNC: 59 MG/DL (ref ?–150)
VLDLC SERPL CALC-MCNC: 11.8 MG/DL
WBC # BLD AUTO: 6.1 K/UL (ref 3.6–11)

## 2021-03-23 ENCOUNTER — TELEPHONE (OUTPATIENT)
Dept: FAMILY MEDICINE CLINIC | Age: 71
End: 2021-03-23

## 2021-03-23 NOTE — TELEPHONE ENCOUNTER
Good afternoon ,  I received a message stating that thepatient is requesting her lab results. Can you please advice and discuss her results. She states she usually get letters but this time she has not gotten anything.     Thank you

## 2021-03-24 NOTE — TELEPHONE ENCOUNTER
Results were available on [employer] with recommendations. Drafted letter with the same. Will mail to the patient.

## 2021-05-24 DIAGNOSIS — I10 ESSENTIAL HYPERTENSION: ICD-10-CM

## 2021-05-24 DIAGNOSIS — E11.9 CONTROLLED TYPE 2 DIABETES MELLITUS WITHOUT COMPLICATION, WITHOUT LONG-TERM CURRENT USE OF INSULIN (HCC): ICD-10-CM

## 2021-05-28 RX ORDER — OLMESARTAN MEDOXOMIL AND HYDROCHLOROTHIAZIDE 40/12.5 40; 12.5 MG/1; MG/1
TABLET ORAL
Qty: 90 TABLET | Refills: 0 | OUTPATIENT
Start: 2021-05-28

## 2021-05-28 RX ORDER — METFORMIN HYDROCHLORIDE 500 MG/1
TABLET ORAL
Qty: 180 TABLET | Refills: 0 | OUTPATIENT
Start: 2021-05-28

## 2021-06-08 PROBLEM — N18.5 CHRONIC KIDNEY DISEASE, STAGE 5 (HCC): Status: ACTIVE | Noted: 2021-06-08

## 2021-06-08 PROBLEM — N18.5 CHRONIC KIDNEY DISEASE, STAGE 5 (HCC): Status: RESOLVED | Noted: 2021-06-08 | Resolved: 2021-06-08

## 2021-06-08 NOTE — PROGRESS NOTES
[address]  [address] [address] Josie [address]ravo   [phone]             Date of visit: 6/9/2021       This is an Subsequent Medicare Annual Wellness Visit (AWV), (Performed more than 12 months after effective date of Medicare Part B enrollment and 12 months after last preventive visit, Once in a lifetime)    I have reviewed the patient's medical history in detail and updated the computerized patient record. Osei Damian is a 79 y.o. female   History obtained from: the patient. Concerns today   (Patient understands that medical problems addressed today may incur additional cost as this is a preventive visit)  - Bilateral leg weakness. Knees feel weak throughout the day. Patient works at [employer] on [employer] all day 6-8hrs/day. No pain/numbness/tingling  - H/o breast cancer. Diagnosed in 2017, patient has not had follow up with breast surgery    History     Patient Active Problem List   Diagnosis Code    Essential hypertension I10    Controlled type 2 diabetes mellitus without complication, without long-term current use of insulin ([facility] E11.9    Breast cancer, left ([address] C50.912    Osteopenia of multiple sites M85.89     Past Medical History:   Diagnosis Date    Diabetes ([facility]     Gallstone     Hypertension     Invasive carcinoma of breast ([facility] [facility] 1/31/2017      Past Surgical History:   Procedure Laterality Date    HX GYN  1984    hysterectomy - Fibroids     Allergies   Allergen Reactions    Doxycycline Nausea and Vomiting    Penicillins Rash and Swelling     Current Outpatient Medications   Medication Sig Dispense Refill    multivitamin (ONE A DAY) tablet Take 1 Tablet by mouth daily.  metFORMIN (GLUCOPHAGE) 500 mg tablet Take one tab daily 180 Tablet 3    olmesartan-hydroCHLOROthiazide (BENICAR HCT) 40-12.5 mg per tablet TAKE 1 TABLET EVERY DAY 90 Tablet 5    cholecalciferol (VITAMIN D3) 1,000 unit cap Take  by mouth daily.       loratadine (CLARITIN) 10 mg tablet Take 1 Tab by mouth daily. 30 Tab 1    fluticasone propionate (FLONASE) 50 mcg/actuation nasal spray 2 Sprays by Both Nostrils route daily. (Patient not taking: Reported on 6/9/2021) 1 Bottle 1    guaiFENesin ER (MUCINEX) 600 mg ER tablet Take 1 Tab by mouth two (2) times a day. (Patient not taking: Reported on 6/9/2021) 30 Tab 0     Family History   Problem Relation Age of Onset    Bleeding Prob Mother         Anyerism- Brain    Diabetes Father     Other Father         hep C    Stroke Sister     No Known Problems Brother     No Known Problems Brother     No Known Problems Brother     No Known Problems Brother     No Known Problems Brother     Lupus Sister     No Known Problems Sister     No Known Problems Sister     No Known Problems Sister    [employer] Migraines Daughter     No Known Problems Daughter      Social History     Tobacco Use    Smoking status: Never Smoker    Smokeless tobacco: Never Used   Substance Use Topics    Alcohol use: No       Specialists/Care Team   Minerva Hi has established care with the following healthcare providers:  PCP    Health Risk Assessment     Demographics   female  79 y.o. General Health Questions   -During the past 4 weeks:              -how would you rate your health in general? Good              -how often have you been bothered by feeling dizzy when standing up? Sometimes              -how much have you been bothered by bodily pain? not              -Have you noticed any hearing difficulties? no              -has your physical and emotional health limited your social activities with family or friends? no     Emotional Health Questions   -Do you have a history of depression, anxiety, or emotional problems? no  -Over the past 2 weeks, have you felt down, depressed or hopeless? no  -Over the past 2 weeks, have you felt little interest or pleasure in doing things?  no     Health Habits   Please describe your diet habits: Patient wrote Claudia Burns dieting\"  Do you get 5 servings of fruits or vegetables daily? no  Do you exercise regularly? yes     Activities of Daily Living and Functional Status   -Do you need help with eating, walking, dressing, bathing, toileting, the phone, transportation, shopping, preparing meals, housework, laundry, medications or managing money? no  -In the past four weeks, was someone available to help you if you needed and wanted help with anything? yes  -Are you confident are you that you can control and manage most of your health problems? yes  -Have you been given information to help you keep track of your medications? no  -How often do you have trouble taking your medications as prescribed? never     Fall Risk and Home Safety   Have you fallen 2 or more times in the past year? no  Does your home have rugs in the hallways? no, Do you have grab bars in the bathrooms?  no, Does your home have handrails on the stairs? no, Do you have adequate lighting in your home? yes  Do you have smoke detectors and check them regularly? yes  Do you have difficulties driving a car/vehicle? no  Do you always fasten your seat belt when you are in a car? yes      Review of Systems (if indicated for problems addressed today)   Breast: no lumps noted  GI:     Physical Examination     Vitals:    06/09/21 0900 06/09/21 0906   BP: (!) 151/79 138/81   Pulse: 80    Resp: 20    Temp: 98.1 °F (36.7 °C)    TempSrc: Oral    SpO2: 100%    Weight: 146 lb (66.2 kg)    Height: 5' 3\" (1.6 m)      Body mass index is 25.86 kg/m².    No exam data present  Was the patient's timed Up & Go test unsteady or longer than 30 seconds? no    Evaluation of Cognitive Function   Mood/affect:  Happy in light of difficult year   Orientation: Person, Place, Time and Situation  Appearance: age appropriate and casually dressed  Family member/caregiver input: came alone    Additional exam if indicated for problems addressed today:  BREAST: no palpable breast masses, axillary LNs, or nipple inversion. CARDIO: RRR, no murmurs auscultated  RESP: lungs clear to auscultation  LE: sensation intact, no LE edema, reflexes 2+, normal gait, knees/calves/thighs nontender     Advice/Referrals/Counseling (as indicated)   Education and counseling provided for any problems identified above: extensive counseling on risks/benefits of untreated breast cancer. We discussed the importance of her following up with breast surgery ASAP to workup and treat the malignancy diagnosed in 2017. Preventive Services     (Preventive care checklist to be included in patient instructions)  Discussed today Done Previously Not Needed     X- couple years ago  Pneumococcal vaccines    X- [facility]  Flu vaccine     X Hepatitis B vaccine (if at risk)   X- counseled, not interested     Shingles vaccine    X- 5 years ago  TDAP vaccine    4/2019 - abnormal, went to [facility] for [address], referred to breast surgery. Has not yet gone  Mammogram     X Pap smear    X- FIT test  2 years ago  Colorectal cancer screening     X Low-dose CT for lung cancer screening    2017  Bone density test   X- will make an apt   Glaucoma screening    2020 nl  Cholesterol test    2020 6.0  Diabetes screening test      X Diabetes self-management class     X Nutritionist referral for diabetes or renal disease     Discussion of Advance Directive   Discussed with Aung Kirby her ability to prepare and advance directive in the case that an injury or illness causes her to be unable to make health care decisions. Assessment/Plan   V70.0    ICD-10-CM ICD-9-CM    1. Medicare annual wellness visit, subsequent  X09.66 L94.2 METABOLIC PANEL, COMPREHENSIVE      METABOLIC PANEL, COMPREHENSIVE   2.  Malignant neoplasm of left female breast, unspecified estrogen receptor status, unspecified site of breast ([facility]  P40.248 932.0 METABOLIC PANEL, COMPREHENSIVE      CBC WITH AUTOMATED DIFF      REFERRAL TO BREAST SURGERY      CBC WITH AUTOMATED DIFF      METABOLIC PANEL, COMPREHENSIVE   3. Controlled type 2 diabetes mellitus without complication, without long-term current use of insulin (H[facility])  E11.9 250.00 HEMOGLOBIN A1C WITH EAG      metFORMIN (GLUCOPHAGE) 500 mg tablet      HEMOGLOBIN A1C WITH EAG   4. Essential hypertension  I10 401.9 olmesartan-hydroCHLOROthiazide (BENICAR HCT) 40-12.5 mg per tablet   5. Colon cancer screening  Z12.11 V76.51 OCCULT BLOOD IMMUNOASSAY,DIAGNOSTIC      OCCULT BLOOD IMMUNOASSAY,DIAGNOSTIC       Orders Placed This Encounter    HEMOGLOBIN A1C WITH EAG    METABOLIC PANEL, COMPREHENSIVE    CBC WITH AUTOMATED DIFF    OCCULT BLOOD IMMUNOASSAY,DIAGNOSTIC    [facility]    multivitamin (ONE A DAY) tablet    metFORMIN (GLUCOPHAGE) 500 mg tablet    olmesartan-hydroCHLOROthiazide (BENICAR HCT) 40-12.5 mg per tablet     Breast cancer  Bx 1/27/2017 Invasive mammary carcinoma with lobular features and lobular carcinoma insitu  Patient reluctant to see breast surgery as she does not want to pursue treatment due to her age. Given that she is still healthy otherwise, still holds a job, and is very active she would benefit from meeting with breast surgery and discussing options. [facility]. Will f/u ALP level in CMP    Bilateral knee weakness  Likely 2/2 to activity. She works 6-8 hour shifts at [employer] on her feet. No numbness/tingling/pain associated. Would be concerned for bony mets if symptoms radiated throughout legs or had neurologic findings. Recommended that she continue to stay active and rest as needed. T2DM  A1c has been ~6 for the last few years. Will recheck A1c today and consider DCing metformin. Patient active with balanced diet.       Joellen Lala MD

## 2021-06-09 ENCOUNTER — OFFICE VISIT (OUTPATIENT)
Dept: FAMILY MEDICINE CLINIC | Age: 71
End: 2021-06-09
Payer: MEDICARE

## 2021-06-09 VITALS
HEART RATE: 80 BPM | WEIGHT: 146 LBS | TEMPERATURE: 98.1 F | OXYGEN SATURATION: 100 % | RESPIRATION RATE: 20 BRPM | SYSTOLIC BLOOD PRESSURE: 138 MMHG | DIASTOLIC BLOOD PRESSURE: 81 MMHG | HEIGHT: 63 IN | BODY MASS INDEX: 25.87 KG/M2

## 2021-06-09 DIAGNOSIS — E11.9 CONTROLLED TYPE 2 DIABETES MELLITUS WITHOUT COMPLICATION, WITHOUT LONG-TERM CURRENT USE OF INSULIN (HCC): ICD-10-CM

## 2021-06-09 DIAGNOSIS — Z00.00 MEDICARE ANNUAL WELLNESS VISIT, SUBSEQUENT: Primary | ICD-10-CM

## 2021-06-09 DIAGNOSIS — C50.912 MALIGNANT NEOPLASM OF LEFT FEMALE BREAST, UNSPECIFIED ESTROGEN RECEPTOR STATUS, UNSPECIFIED SITE OF BREAST (HCC): ICD-10-CM

## 2021-06-09 DIAGNOSIS — I10 ESSENTIAL HYPERTENSION: ICD-10-CM

## 2021-06-09 DIAGNOSIS — Z12.11 COLON CANCER SCREENING: ICD-10-CM

## 2021-06-09 LAB
ALBUMIN SERPL-MCNC: 4.3 G/DL (ref 3.5–5)
ALBUMIN/GLOB SERPL: 1.4 {RATIO} (ref 1.1–2.2)
ALP SERPL-CCNC: 72 U/L (ref 45–117)
ALT SERPL-CCNC: 15 U/L (ref 12–78)
ANION GAP SERPL CALC-SCNC: 8 MMOL/L (ref 5–15)
AST SERPL-CCNC: 12 U/L (ref 15–37)
BASOPHILS # BLD: 0 K/UL (ref 0–0.1)
BASOPHILS NFR BLD: 1 % (ref 0–1)
BILIRUB SERPL-MCNC: 0.9 MG/DL (ref 0.2–1)
BUN SERPL-MCNC: 14 MG/DL (ref 6–20)
BUN/CREAT SERPL: 18 (ref 12–20)
CALCIUM SERPL-MCNC: 10.1 MG/DL (ref 8.5–10.1)
CHLORIDE SERPL-SCNC: 106 MMOL/L (ref 97–108)
CO2 SERPL-SCNC: 27 MMOL/L (ref 21–32)
CREAT SERPL-MCNC: 0.77 MG/DL (ref 0.55–1.02)
DIFFERENTIAL METHOD BLD: NORMAL
EOSINOPHIL # BLD: 0 K/UL (ref 0–0.4)
EOSINOPHIL NFR BLD: 1 % (ref 0–7)
ERYTHROCYTE [DISTWIDTH] IN BLOOD BY AUTOMATED COUNT: 12.6 % (ref 11.5–14.5)
EST. AVERAGE GLUCOSE BLD GHB EST-MCNC: 128 MG/DL
GLOBULIN SER CALC-MCNC: 3.1 G/DL (ref 2–4)
GLUCOSE SERPL-MCNC: 136 MG/DL (ref 65–100)
HBA1C MFR BLD: 6.1 % (ref 4–5.6)
HCT VFR BLD AUTO: 43.9 % (ref 35–47)
HGB BLD-MCNC: 13.4 G/DL (ref 11.5–16)
IMM GRANULOCYTES # BLD AUTO: 0 K/UL (ref 0–0.04)
IMM GRANULOCYTES NFR BLD AUTO: 0 % (ref 0–0.5)
LYMPHOCYTES # BLD: 1.7 K/UL (ref 0.8–3.5)
LYMPHOCYTES NFR BLD: 26 % (ref 12–49)
MCH RBC QN AUTO: 28.9 PG (ref 26–34)
MCHC RBC AUTO-ENTMCNC: 30.5 G/DL (ref 30–36.5)
MCV RBC AUTO: 94.8 FL (ref 80–99)
MONOCYTES # BLD: 0.5 K/UL (ref 0–1)
MONOCYTES NFR BLD: 8 % (ref 5–13)
NEUTS SEG # BLD: 4.2 K/UL (ref 1.8–8)
NEUTS SEG NFR BLD: 64 % (ref 32–75)
NRBC # BLD: 0 K/UL (ref 0–0.01)
NRBC BLD-RTO: 0 PER 100 WBC
PLATELET # BLD AUTO: 228 K/UL (ref 150–400)
PMV BLD AUTO: 10.8 FL (ref 8.9–12.9)
POTASSIUM SERPL-SCNC: 3.5 MMOL/L (ref 3.5–5.1)
PROT SERPL-MCNC: 7.4 G/DL (ref 6.4–8.2)
RBC # BLD AUTO: 4.63 M/UL (ref 3.8–5.2)
SODIUM SERPL-SCNC: 141 MMOL/L (ref 136–145)
WBC # BLD AUTO: 6.5 K/UL (ref 3.6–11)

## 2021-06-09 PROCEDURE — 1101F PT FALLS ASSESS-DOCD LE1/YR: CPT | Performed by: STUDENT IN AN ORGANIZED HEALTH CARE EDUCATION/TRAINING PROGRAM

## 2021-06-09 PROCEDURE — G8432 DEP SCR NOT DOC, RNG: HCPCS | Performed by: STUDENT IN AN ORGANIZED HEALTH CARE EDUCATION/TRAINING PROGRAM

## 2021-06-09 PROCEDURE — 3044F HG A1C LEVEL LT 7.0%: CPT | Performed by: STUDENT IN AN ORGANIZED HEALTH CARE EDUCATION/TRAINING PROGRAM

## 2021-06-09 PROCEDURE — 2022F DILAT RTA XM EVC RTNOPTHY: CPT | Performed by: STUDENT IN AN ORGANIZED HEALTH CARE EDUCATION/TRAINING PROGRAM

## 2021-06-09 PROCEDURE — 1090F PRES/ABSN URINE INCON ASSESS: CPT | Performed by: STUDENT IN AN ORGANIZED HEALTH CARE EDUCATION/TRAINING PROGRAM

## 2021-06-09 PROCEDURE — G8419 CALC BMI OUT NRM PARAM NOF/U: HCPCS | Performed by: STUDENT IN AN ORGANIZED HEALTH CARE EDUCATION/TRAINING PROGRAM

## 2021-06-09 PROCEDURE — G9231 DOC ESRD DIA TRANS PREG: HCPCS | Performed by: STUDENT IN AN ORGANIZED HEALTH CARE EDUCATION/TRAINING PROGRAM

## 2021-06-09 PROCEDURE — 99397 PER PM REEVAL EST PAT 65+ YR: CPT | Performed by: STUDENT IN AN ORGANIZED HEALTH CARE EDUCATION/TRAINING PROGRAM

## 2021-06-09 PROCEDURE — 3017F COLORECTAL CA SCREEN DOC REV: CPT | Performed by: STUDENT IN AN ORGANIZED HEALTH CARE EDUCATION/TRAINING PROGRAM

## 2021-06-09 RX ORDER — BISMUTH SUBSALICYLATE 262 MG
1 TABLET,CHEWABLE ORAL DAILY
COMMUNITY

## 2021-06-09 RX ORDER — OLMESARTAN MEDOXOMIL AND HYDROCHLOROTHIAZIDE 40/12.5 40; 12.5 MG/1; MG/1
TABLET ORAL
Qty: 90 TABLET | Refills: 5 | Status: SHIPPED | OUTPATIENT
Start: 2021-06-09

## 2021-06-09 RX ORDER — METFORMIN HYDROCHLORIDE 500 MG/1
TABLET ORAL
Qty: 180 TABLET | Refills: 3 | Status: SHIPPED | OUTPATIENT
Start: 2021-06-09 | End: 2021-06-15 | Stop reason: ALTCHOICE

## 2021-06-09 NOTE — PROGRESS NOTES
Identified Patient with two Patient identifiers (Name and ). Two Patient Identifiers confirmed. Reviewed record in preparation for visit and have obtained necessary documentation. Chief Complaint   Patient presents with    Annual Wellness Visit     Vitals:    21 0900 21 0906   BP: (!) 151/79 138/81   BP 1 Location: Right arm Right arm   BP Patient Position: Sitting Sitting   BP Cuff Size:  Adult   Pulse: 80    Temp: 98.1 °F (36.7 °C)    TempSrc: Oral    Resp: 20    Height: 5' 3\" (1.6 m)    Weight: 146 lb (66.2 kg)    SpO2: 100%        1. Have you been to the ER, urgent care clinic since your last visit? Hospitalized since your last visit? No    2. Have you seen or consulted any other health care providers outside of the [address] [place] [address] since your last visit? Include any pap smears or colon screening. No    Medicare Wellness  General Health Questions   -During the past 4 weeks:   -how would you rate your health in general? Good   -how often have you been bothered by feeling dizzy when standing up? Sometimes   -how much have you been bothered by bodily pain? not   -Have you noticed any hearing difficulties? no   -has your physical and emotional health limited your social activities with family or friends? no    Emotional Health Questions   -Do you have a history of depression, anxiety, or emotional problems? no  -Over the past 2 weeks, have you felt down, depressed or hopeless? no  -Over the past 2 weeks, have you felt little interest or pleasure in doing things? no    Health Habits   Please describe your diet habits: Patient wrote \"standard dieting\"  Do you get 5 servings of fruits or vegetables daily? no  Do you exercise regularly?  yes    Activities of Daily Living and Functional Status   -Do you need help with eating, walking, dressing, bathing, toileting, the phone, transportation, shopping, preparing meals, housework, laundry, medications or managing money? no  -In the past four weeks, was someone available to help you if you needed and wanted help with anything? yes  -Are you confident are you that you can control and manage most of your health problems? yes  -Have you been given information to help you keep track of your medications? no  -How often do you have trouble taking your medications as prescribed? never    Fall Risk and Home Safety   Have you fallen 2 or more times in the past year? no  Does your home have rugs in the hallways? no, Do you have grab bars in the bathrooms?  no, Does your home have handrails on the stairs? no, Do you have adequate lighting in your home? yes  Do you have smoke detectors and check them regularly?  yes  Do you have difficulties driving a car/vehicle? no  Do you always fasten your seat belt when you are in a car? yes

## 2021-06-15 NOTE — PROGRESS NOTES
A1c stable at 6.1  Patient meets criteria to trial coming off Metformin. Encouraged patient to continue active lifestyle and healthy diet.  May stop metformin and recheck A1c in 6 months

## 2021-06-16 ENCOUNTER — TELEPHONE (OUTPATIENT)
Dept: SURGERY | Age: 71
End: 2021-06-16

## 2021-06-18 LAB — HEMOCCULT STL QL IA: NEGATIVE

## 2021-12-02 ENCOUNTER — VIRTUAL VISIT (OUTPATIENT)
Dept: FAMILY MEDICINE CLINIC | Age: 71
End: 2021-12-02
Payer: MEDICARE

## 2021-12-02 DIAGNOSIS — J06.9 VIRAL URI: Primary | ICD-10-CM

## 2021-12-02 PROCEDURE — 99442 PR PHYS/QHP TELEPHONE EVALUATION 11-20 MIN: CPT | Performed by: STUDENT IN AN ORGANIZED HEALTH CARE EDUCATION/TRAINING PROGRAM

## 2021-12-02 RX ORDER — FLUTICASONE PROPIONATE 50 MCG
2 SPRAY, SUSPENSION (ML) NASAL DAILY
Qty: 1 EACH | Refills: 0 | Status: SHIPPED | OUTPATIENT
Start: 2021-12-02 | End: 2021-12-25

## 2021-12-02 NOTE — PROGRESS NOTES
Bernard Fortune  70 y.o. female  1950  [address]  [facility] [address]  [id number]    [phone] (home)      [address]:    Telephone Encounter  Patti Clarke MD       Encounter Date: 12/2/2021 at 11:00 AM    Consent: Bernard Fortune, who was seen by synchronous (real-time) audio only technology, and/or her healthcare decision maker, is aware that this patient-initiated, Telehealth encounter on 12/2/2021 is a billable service, with coverage as determined by her insurance carrier. She is aware that she may receive a bill and has provided verbal consent to proceed: Yes. Chief Complaint   Patient presents with    Nasal Congestion       History of Present Illness   Bernard Fortune is a 70 y.o. female was evaluated by telephone. I communicated with the patient and/or health care decision maker about plan of care. Congestion in chest x3 days  Runny nose, productive cough  Denies fever, myalgia, N/V, SOB, CP, diarrhea  Has been vaccinated against Covid, no sick contacts  Has tried taking some decongestant meds    Review of Systems   Review of Systems   All other systems reviewed and are negative. Vitals/Objective:   General: Patient speaking in complete sentences without effort. Normal speech and cooperative. Occasional productive cough appreciated. Due to this being a Virtual Check-in/Telephone evaluation, many elements of the physical examination are unable to be assessed. Assessment and Plan: Total Time: minutes: 11-20 minutes    1. Viral URI  - Conservative therapy, rest, hydration, mucinex, flonase  - Encouraged pt to get Covid booster  - guaiFENesin-dextromethorphan SR (HUMIBID DM) 600-30 mg per tablet; Take 1 Tablet by mouth two (2) times a day. Dispense: 30 Tablet; Refill: 0  - fluticasone propionate (FLONASE) 50 mcg/actuation nasal spray; 2 Sprays by Both Nostrils route daily.   Dispense: 1 Each; Refill: 0      Patient informed to follow up: PRN    I affirm this is a Patient Initiated Episode with an Established Patient who has not had a related appointment within my department in the past 7 days or scheduled within the next 24 hours. Note: not billable if this call serves to triage the patient into an appointment for the relevant concern      Electronically Signed: Jamal Mendez MD  Providers location when delivering service: home    CPT:  25503 (5-10 minutes)  30017 (11-20 minutes)  20898 (21-30 minutes)    Medicare:   - Virtual Check-in      ICD-10-CM ICD-9-CM    1. Viral URI  J06.9 465.9 guaiFENesin-dextromethorphan SR (HUMIBID DM) 600-30 mg per tablet      fluticasone propionate (FLONASE) 50 mcg/actuation nasal spray       Pursuant to the emergency declaration under the [address] [place] [address], [address] [place] [address] authority and the [employer] Act, this Virtual  Visit was conducted, with patient's consent, to reduce the patient's risk of exposure to COVID-19 and provide continuity of care for an established patient. History   Patients past medical, surgical and family histories were personally reviewed and updated.       Past Medical History:   Diagnosis Date    Diabetes ([facility]     Gallstone     Hypertension     Invasive carcinoma of breast ([facility] 1/31/2017     Past Surgical History:   Procedure Laterality Date    HX GYN  1984    hysterectomy - Fibroids     Family History   Problem Relation Age of Onset    Bleeding Prob Mother         Anyerism- Brain    Diabetes Father     Other Father         hep C    Stroke Sister     No Known Problems Brother     No Known Problems Brother     No Known Problems Brother     No Known Problems Brother     No Known Problems Brother     Lupus Sister     No Known Problems Sister     No Known Problems Sister     No Known Problems Sister    Migdalia Herron Migraines Daughter     No Known Problems Daughter      Social History     Tobacco Use    Smoking status: Never Smoker    Smokeless tobacco: Never Used   Substance Use Topics    Alcohol use: No    Drug use: No              Current Medications/Allergies   Medications and Allergies reviewed:    Current Outpatient Medications   Medication Sig Dispense Refill    guaiFENesin-dextromethorphan SR (HUMIBID DM) 600-30 mg per tablet Take 1 Tablet by mouth two (2) times a day. 30 Tablet 0    fluticasone propionate (FLONASE) 50 mcg/actuation nasal spray 2 Sprays by Both Nostrils route daily. 1 Each 0    multivitamin (ONE A DAY) tablet Take 1 Tablet by mouth daily.  olmesartan-hydroCHLOROthiazide (BENICAR HCT) 40-12.5 mg per tablet TAKE 1 TABLET EVERY DAY 90 Tablet 5    guaiFENesin ER (MUCINEX) 600 mg ER tablet Take 1 Tab by mouth two (2) times a day. (Patient not taking: Reported on 6/9/2021) 30 Tab 0    cholecalciferol (VITAMIN D3) 1,000 unit cap Take  by mouth daily.  loratadine (CLARITIN) 10 mg tablet Take 1 Tab by mouth daily.  30 Tab 1     Allergies   Allergen Reactions    Doxycycline Nausea and Vomiting    Penicillins Rash and Swelling

## 2021-12-02 NOTE — PROGRESS NOTES
[address] Medicine Residency Attending Addendum:  Dr. Barbara Braden MD,  the patient and I were not physically present during this encounter. The resident and I are concurrently monitoring the patient care through appropriate telecommunication technology. I discussed the findings, assessment and plan with the resident and agree with the resident's findings and plan as documented in the resident's note.       Agustín Palomares MD

## 2021-12-25 DIAGNOSIS — J06.9 VIRAL URI: ICD-10-CM

## 2021-12-25 RX ORDER — FLUTICASONE PROPIONATE 50 MCG
2 SPRAY, SUSPENSION (ML) NASAL DAILY
Qty: 1 EACH | Refills: 0 | Status: SHIPPED | OUTPATIENT
Start: 2021-12-25

## 2022-03-19 PROBLEM — M85.89 OSTEOPENIA OF MULTIPLE SITES: Status: ACTIVE | Noted: 2017-06-04

## 2022-03-19 PROBLEM — C50.912 BREAST CANCER, LEFT (HCC): Status: ACTIVE | Noted: 2017-01-31

## 2022-06-01 ENCOUNTER — OFFICE VISIT (OUTPATIENT)
Dept: FAMILY MEDICINE CLINIC | Age: 72
End: 2022-06-01
Payer: MEDICARE

## 2022-06-01 VITALS
OXYGEN SATURATION: 100 % | BODY MASS INDEX: 25.51 KG/M2 | SYSTOLIC BLOOD PRESSURE: 137 MMHG | DIASTOLIC BLOOD PRESSURE: 87 MMHG | HEART RATE: 74 BPM | WEIGHT: 144 LBS

## 2022-06-01 DIAGNOSIS — Z12.31 BREAST CANCER SCREENING BY MAMMOGRAM: ICD-10-CM

## 2022-06-01 DIAGNOSIS — Z12.11 COLON CANCER SCREENING: ICD-10-CM

## 2022-06-01 DIAGNOSIS — I10 ESSENTIAL HYPERTENSION: ICD-10-CM

## 2022-06-01 DIAGNOSIS — E11.9 CONTROLLED TYPE 2 DIABETES MELLITUS WITHOUT COMPLICATION, WITHOUT LONG-TERM CURRENT USE OF INSULIN (HCC): Primary | ICD-10-CM

## 2022-06-01 LAB
ALBUMIN SERPL-MCNC: 4.1 G/DL (ref 3.5–5)
ALBUMIN/GLOB SERPL: 1.3 {RATIO} (ref 1.1–2.2)
ALP SERPL-CCNC: 71 U/L (ref 45–117)
ALT SERPL-CCNC: 13 U/L (ref 12–78)
ANION GAP SERPL CALC-SCNC: 7 MMOL/L (ref 5–15)
AST SERPL-CCNC: 14 U/L (ref 15–37)
BILIRUB SERPL-MCNC: 1.2 MG/DL (ref 0.2–1)
BUN SERPL-MCNC: 12 MG/DL (ref 6–20)
BUN/CREAT SERPL: 16 (ref 12–20)
CALCIUM SERPL-MCNC: 9.9 MG/DL (ref 8.5–10.1)
CHLORIDE SERPL-SCNC: 104 MMOL/L (ref 97–108)
CHOLEST SERPL-MCNC: 196 MG/DL
CO2 SERPL-SCNC: 31 MMOL/L (ref 21–32)
CREAT SERPL-MCNC: 0.73 MG/DL (ref 0.55–1.02)
CREAT UR-MCNC: 209 MG/DL
EST. AVERAGE GLUCOSE BLD GHB EST-MCNC: 137 MG/DL
GLOBULIN SER CALC-MCNC: 3.2 G/DL (ref 2–4)
GLUCOSE SERPL-MCNC: 117 MG/DL (ref 65–100)
HBA1C MFR BLD: 6.4 % (ref 4–5.6)
HDLC SERPL-MCNC: 80 MG/DL
HDLC SERPL: 2.5 {RATIO} (ref 0–5)
LDLC SERPL CALC-MCNC: 102.2 MG/DL (ref 0–100)
MICROALBUMIN UR-MCNC: 2.13 MG/DL
MICROALBUMIN/CREAT UR-RTO: 10 MG/G (ref 0–30)
POTASSIUM SERPL-SCNC: 4.3 MMOL/L (ref 3.5–5.1)
PROT SERPL-MCNC: 7.3 G/DL (ref 6.4–8.2)
SODIUM SERPL-SCNC: 142 MMOL/L (ref 136–145)
TRIGL SERPL-MCNC: 69 MG/DL (ref ?–150)
VLDLC SERPL CALC-MCNC: 13.8 MG/DL

## 2022-06-01 PROCEDURE — G9231 DOC ESRD DIA TRANS PREG: HCPCS | Performed by: STUDENT IN AN ORGANIZED HEALTH CARE EDUCATION/TRAINING PROGRAM

## 2022-06-01 PROCEDURE — G9899 SCRN MAM PERF RSLTS DOC: HCPCS | Performed by: STUDENT IN AN ORGANIZED HEALTH CARE EDUCATION/TRAINING PROGRAM

## 2022-06-01 PROCEDURE — G8427 DOCREV CUR MEDS BY ELIG CLIN: HCPCS | Performed by: STUDENT IN AN ORGANIZED HEALTH CARE EDUCATION/TRAINING PROGRAM

## 2022-06-01 PROCEDURE — 3017F COLORECTAL CA SCREEN DOC REV: CPT | Performed by: STUDENT IN AN ORGANIZED HEALTH CARE EDUCATION/TRAINING PROGRAM

## 2022-06-01 PROCEDURE — G8536 NO DOC ELDER MAL SCRN: HCPCS | Performed by: STUDENT IN AN ORGANIZED HEALTH CARE EDUCATION/TRAINING PROGRAM

## 2022-06-01 PROCEDURE — G8399 PT W/DXA RESULTS DOCUMENT: HCPCS | Performed by: STUDENT IN AN ORGANIZED HEALTH CARE EDUCATION/TRAINING PROGRAM

## 2022-06-01 PROCEDURE — 2022F DILAT RTA XM EVC RTNOPTHY: CPT | Performed by: STUDENT IN AN ORGANIZED HEALTH CARE EDUCATION/TRAINING PROGRAM

## 2022-06-01 PROCEDURE — G8417 CALC BMI ABV UP PARAM F/U: HCPCS | Performed by: STUDENT IN AN ORGANIZED HEALTH CARE EDUCATION/TRAINING PROGRAM

## 2022-06-01 PROCEDURE — 1101F PT FALLS ASSESS-DOCD LE1/YR: CPT | Performed by: STUDENT IN AN ORGANIZED HEALTH CARE EDUCATION/TRAINING PROGRAM

## 2022-06-01 PROCEDURE — G8432 DEP SCR NOT DOC, RNG: HCPCS | Performed by: STUDENT IN AN ORGANIZED HEALTH CARE EDUCATION/TRAINING PROGRAM

## 2022-06-01 PROCEDURE — 99214 OFFICE O/P EST MOD 30 MIN: CPT | Performed by: STUDENT IN AN ORGANIZED HEALTH CARE EDUCATION/TRAINING PROGRAM

## 2022-06-01 PROCEDURE — 3044F HG A1C LEVEL LT 7.0%: CPT | Performed by: STUDENT IN AN ORGANIZED HEALTH CARE EDUCATION/TRAINING PROGRAM

## 2022-06-01 PROCEDURE — 1090F PRES/ABSN URINE INCON ASSESS: CPT | Performed by: STUDENT IN AN ORGANIZED HEALTH CARE EDUCATION/TRAINING PROGRAM

## 2022-06-01 NOTE — PROGRESS NOTES
[address] [facility] [address] [address]   Office [phone], Fax [phone]      Chief Complaint:     Chief Complaint   Patient presents with    Diabetes     check up       Solis Montiel is a 70 y.o. female that presents for: follow up DM       Assessment/Plan:     1. Controlled type 2 diabetes mellitus without complication, without long-term current use of insulin (HCC)  Assessment & Plan:  Off metformin x1 yr.  A1c was 6.0 last year. Will check labs today. If A1c unchanged can likely remain off metformin, if it increases consider adding back Metformin 500mg BID (will discuss with Pt)   -advised f/u eye doctor   -continue watching carbs and work on daily diet and exercise   -discussed good foot hygiene  Orders:  -     MICROALBUMIN, UR, RAND W/ MICROALB/CREAT RATIO; Future  -     HEMOGLOBIN A1C WITH EAG; Future  -     METABOLIC PANEL, COMPREHENSIVE; Future  -     LIPID PANEL; Future  -      DIABETES FOOT EXAM  2. Essential hypertension  Assessment & Plan:  BP well controlled at goal <140/90  Continue Benicar   Home BP monitoring   Check CMP today   Orders:  -     METABOLIC PANEL, COMPREHENSIVE; Future  3. Breast cancer screening by mammogram  -     [facility] MAMMO BI SCREENING INCL CAD; Future  4. Colon cancer screening  -     OCCULT BLOOD IMMUNOASSAY,DIAGNOSTIC; Future          Follow up: Follow-up and Dispositions    · Return in about 1 month (around 2022) for medicare wellness . Subjective:   HPI:  Solis Montiel is a 70 y.o. female that presents for:    DIABETIC CARE CHECKLIST   Current meds: off Metformin   BG lomg every day   BP log: none      1. Patient on ASA  no (if they have had MI/CVA then start ASA, if ASCVD risk >10, if on it in past continue)  2. Patient on Statin- no (LDL goal <70)   3. Patient on ACE- yes on ARB   4. Diet watching carbs   5. Exercise  nothing structured. Walks a lot at work at [employer]   6. Blood pressure - controlled   7.  Last eye check  missed last year    8. Last cholesterol check - 2/2021   9. Last HbA1c - 6/2021 (6.1)   10. Seen diabetes education - no  11. Does the patient have a cardiologist- no  12. Does the patient have a nephrologist- no  13. Did patient receive pneumococcal vaccine - yes   14. Did patient receive influenza vaccine - yes       Health Maintenance:  Health Maintenance Due   Topic Date Due    Pneumococcal 65+ years (1 - PCV) Never done    Eye Exam Retinal or Dilated  Never done    DTaP/Tdap/Td series (1 - Tdap) Never done    Shingrix Vaccine Age 50> (1 of 2) Never done    Breast Cancer Screen Mammogram  01/25/2018    Medicare Yearly Exam  08/19/2020    COVID-19 Vaccine (3 - Booster for Pfizer series) 10/18/2021    MICROALBUMIN Q1  02/25/2022    Lipid Screen  02/25/2022    Depression Screen  06/09/2022    A1C test (Diabetic or Prediabetic)  06/09/2022    Colorectal Cancer Screening Combo  06/17/2022        ROS:   Review of Systems   Constitutional: Negative for chills and fever. Eyes: Negative for blurred vision and double vision. Respiratory: Negative for shortness of breath. Cardiovascular: Negative for chest pain and leg swelling. Gastrointestinal: Negative for abdominal pain, nausea and vomiting. Genitourinary: Negative for dysuria, frequency and urgency. Musculoskeletal: Negative for joint pain. Skin: Negative for rash. Neurological: Negative for dizziness, tingling, sensory change and headaches. Past medical history, social history, and medications personally reviewed. Past Medical History:   Diagnosis Date    [facility]     Gallstone     Hypertension     Invasive carcinoma of breast ([facility] [facility] 1/31/2017        Allergies personally reviewed. Allergies   Allergen Reactions    Doxycycline Nausea and Vomiting    Penicillins Rash and Swelling          Objective:   Vitals reviewed.   Visit Vitals  /87 (BP 1 Location: Right arm, BP Patient Position: Sitting, BP Cuff Size: Adult)   Pulse 74   Wt 144 lb (65.3 kg)   SpO2 100%   BMI 25.51 kg/m²        Physical Exam  Physical Exam  Vitals and nursing note reviewed. HENT:      Head: Normocephalic and atraumatic. Eyes:      General: Lids are normal.      Extraocular Movements: Extraocular movements intact. Conjunctiva/sclera: Conjunctivae normal.      Pupils: Pupils are equal, round, and reactive to light. Funduscopic exam:     Right eye: No hemorrhage or papilledema. Left eye: No hemorrhage or papilledema. Comments: Questionable mild cataract Lt eye    Cardiovascular:      Rate and Rhythm: Normal rate and regular rhythm. Heart sounds: Normal heart sounds. No murmur heard. No friction rub. No gallop. Pulmonary:      Effort: Pulmonary effort is normal. No respiratory distress. Breath sounds: Normal breath sounds. No wheezing. Musculoskeletal:         General: Normal range of motion. Cervical back: Normal range of motion and neck supple. Comments: Sensory exam of the foot is normal, tested with the monofilament. Good pulses, no lesions or ulcers, good peripheral pulses. Skin:     General: Skin is warm and dry. Neurological:      Mental Status: She is alert. Psychiatric:         Mood and Affect: Affect normal.              Pt was discussed with Dr Carmen Cole (attending physician). I have reviewed pertinent labs results and other data. I have discussed the diagnosis with the patient and the intended plan as seen in the above orders. The patient has received an after-visit summary and questions were answered concerning future plans. I have discussed medication side effects and warnings with the patient as well.     Vianca Brown DO  Resident [facility]  06/01/22

## 2022-06-01 NOTE — PROGRESS NOTES
Chief Complaint   Patient presents with    Diabetes     check up     Visit Vitals  /87 (BP 1 Location: Right arm, BP Patient Position: Sitting, BP Cuff Size: Adult)   Pulse 74   Wt 144 lb (65.3 kg)   SpO2 100%   BMI 25.51 kg/m²

## 2022-06-01 NOTE — ASSESSMENT & PLAN NOTE
Off metformin x1 yr.  A1c was 6.0 last year. Will check labs today.   If A1c unchanged can likely remain off metformin, if it increases consider adding back Metformin 500mg BID (will discuss with Pt)   -advised f/u eye doctor   -continue watching carbs and work on daily diet and exercise   -discussed good foot hygiene

## 2022-06-03 ENCOUNTER — TELEPHONE (OUTPATIENT)
Dept: FAMILY MEDICINE CLINIC | Age: 72
End: 2022-06-03

## 2022-06-03 DIAGNOSIS — E78.2 MIXED HYPERLIPIDEMIA: Primary | ICD-10-CM

## 2022-06-03 DIAGNOSIS — E11.9 CONTROLLED TYPE 2 DIABETES MELLITUS WITHOUT COMPLICATION, WITHOUT LONG-TERM CURRENT USE OF INSULIN (HCC): ICD-10-CM

## 2022-06-03 DIAGNOSIS — Z12.11 COLON CANCER SCREENING: ICD-10-CM

## 2022-06-03 RX ORDER — METFORMIN HYDROCHLORIDE 500 MG/1
500 TABLET ORAL
Qty: 90 TABLET | Refills: 3 | Status: SHIPPED | OUTPATIENT
Start: 2022-06-03 | End: 2022-09-01

## 2022-06-03 RX ORDER — ATORVASTATIN CALCIUM 40 MG/1
40 TABLET, FILM COATED ORAL
Qty: 90 TABLET | Refills: 0 | Status: SHIPPED | OUTPATIENT
Start: 2022-06-03 | End: 2022-09-01

## 2022-06-03 NOTE — TELEPHONE ENCOUNTER
rx sent for   -Metformin 500mg every day   -Lipitor 40mg qhs- explained to Pt over phone if she has muscle cramps from this medication or side effects we can drop it down to 20mg.

## 2022-06-03 NOTE — PROGRESS NOTES
The 10-year ASCVD risk score (Milagros Traore, et al., 2013) is: 31.7%- needs to be on a statin  CMP okay   A1c going up- will restart Metformin 500mg every day and recheck A1c in 3 months       I discussed all of these findings with patient over the phone.   She agreed to start taking Metformin 500mg daily and start lipitor 40mg qhs.

## 2022-06-04 LAB — HEMOCCULT STL QL IA: NEGATIVE

## 2022-10-24 ENCOUNTER — CLINICAL SUPPORT (OUTPATIENT)
Dept: FAMILY MEDICINE CLINIC | Age: 72
End: 2022-10-24
Payer: MEDICARE

## 2022-10-24 VITALS
OXYGEN SATURATION: 97 % | HEIGHT: 65 IN | DIASTOLIC BLOOD PRESSURE: 83 MMHG | HEART RATE: 77 BPM | SYSTOLIC BLOOD PRESSURE: 151 MMHG | TEMPERATURE: 97.8 F | BODY MASS INDEX: 25.66 KG/M2 | RESPIRATION RATE: 17 BRPM | WEIGHT: 154 LBS

## 2022-10-24 DIAGNOSIS — Z23 ENCOUNTER FOR IMMUNIZATION: Primary | ICD-10-CM

## 2022-10-24 PROCEDURE — 90694 VACC AIIV4 NO PRSRV 0.5ML IM: CPT | Performed by: FAMILY MEDICINE

## 2022-10-24 PROCEDURE — G0008 ADMIN INFLUENZA VIRUS VAC: HCPCS | Performed by: FAMILY MEDICINE

## 2023-03-13 ENCOUNTER — TELEPHONE (OUTPATIENT)
Dept: FAMILY MEDICINE CLINIC | Age: 73
End: 2023-03-13

## 2023-03-13 DIAGNOSIS — I10 ESSENTIAL HYPERTENSION: ICD-10-CM

## 2023-03-13 NOTE — TELEPHONE ENCOUNTER
Pharmacy is looking for a refill of Olmesartan 40 mg tabs. I did not see this on her list of meds. I will place in the black box also. Thanks!

## 2023-03-15 RX ORDER — OLMESARTAN MEDOXOMIL AND HYDROCHLOROTHIAZIDE 40/12.5 40; 12.5 MG/1; MG/1
TABLET ORAL
Qty: 90 TABLET | Refills: 0 | Status: SHIPPED | OUTPATIENT
Start: 2023-03-15

## 2023-03-16 ENCOUNTER — TELEPHONE (OUTPATIENT)
Dept: FAMILY MEDICINE CLINIC | Age: 73
End: 2023-03-16

## 2023-03-16 NOTE — TELEPHONE ENCOUNTER
Pt stated that [facility] informed her that her Rx will not arrive to her for another 7-10 days; however, she has not had any to take and now her BP is elevated. She is requesting that you send a Rx for Olmesartan-hydrochlorthiazide to [facility] at [address] [place] [address] to last her until she receives the mail order Rx.

## 2023-03-17 NOTE — TELEPHONE ENCOUNTER
Called and spoke with patient. She is okay with 90 day supply being sent to local [facility] pharmacy as she is completely out of medication. Spoke with Dr. Rach Flores and got the okay to call in a verbal to them, this was done. Patient also scheduled for HTN follow up with Dr. Kera Singer on 4/18.

## 2023-03-17 NOTE — TELEPHONE ENCOUNTER
----- Message from [facility] sent at 3/14/2023  1:22 PM EDT -----  Subject: Message to Provider    QUESTIONS  Information for Provider? patient wants to know what the status is for her   refill on Olmesartan 40 mg tabs the pharmacy called yesterday and have not   gotten a response please contact patient please  ---------------------------------------------------------------------------  --------------  [facility]  [phone]; OK to leave message on voicemail  ---------------------------------------------------------------------------  --------------  SCRIPT ANSWERS  Relationship to Patient?  Self

## 2023-03-17 NOTE — TELEPHONE ENCOUNTER
Pt called back and stated that [address] informed her that someone named Marvis Severance contacted them and stated that her Rx for Surekha Gloria was cancelled through them, due to an order being written to [facility]. Pt expressed frustration and asked for this to be addressed immediately. This writer has spoken with David Chambers to advise.     Thank you

## 2023-04-18 ENCOUNTER — OFFICE VISIT (OUTPATIENT)
Dept: FAMILY MEDICINE CLINIC | Age: 73
End: 2023-04-18
Payer: MEDICARE

## 2023-04-18 VITALS
OXYGEN SATURATION: 96 % | BODY MASS INDEX: 26.46 KG/M2 | DIASTOLIC BLOOD PRESSURE: 90 MMHG | HEART RATE: 70 BPM | TEMPERATURE: 98.4 F | RESPIRATION RATE: 18 BRPM | HEIGHT: 65 IN | SYSTOLIC BLOOD PRESSURE: 171 MMHG | WEIGHT: 158.8 LBS

## 2023-04-18 DIAGNOSIS — C50.112 MALIGNANT NEOPLASM OF CENTRAL PORTION OF LEFT FEMALE BREAST, UNSPECIFIED ESTROGEN RECEPTOR STATUS (HCC): ICD-10-CM

## 2023-04-18 DIAGNOSIS — I10 ESSENTIAL HYPERTENSION: Primary | ICD-10-CM

## 2023-04-18 DIAGNOSIS — E11.9 CONTROLLED TYPE 2 DIABETES MELLITUS WITHOUT COMPLICATION, WITHOUT LONG-TERM CURRENT USE OF INSULIN (HCC): ICD-10-CM

## 2023-04-18 DIAGNOSIS — M85.89 OSTEOPENIA OF MULTIPLE SITES: ICD-10-CM

## 2023-04-18 PROCEDURE — G9899 SCRN MAM PERF RSLTS DOC: HCPCS | Performed by: FAMILY MEDICINE

## 2023-04-18 PROCEDURE — 3017F COLORECTAL CA SCREEN DOC REV: CPT | Performed by: FAMILY MEDICINE

## 2023-04-18 PROCEDURE — G8417 CALC BMI ABV UP PARAM F/U: HCPCS | Performed by: FAMILY MEDICINE

## 2023-04-18 PROCEDURE — 1123F ACP DISCUSS/DSCN MKR DOCD: CPT | Performed by: FAMILY MEDICINE

## 2023-04-18 PROCEDURE — 2022F DILAT RTA XM EVC RTNOPTHY: CPT | Performed by: FAMILY MEDICINE

## 2023-04-18 PROCEDURE — 3044F HG A1C LEVEL LT 7.0%: CPT | Performed by: FAMILY MEDICINE

## 2023-04-18 PROCEDURE — 99214 OFFICE O/P EST MOD 30 MIN: CPT | Performed by: FAMILY MEDICINE

## 2023-04-18 PROCEDURE — G8399 PT W/DXA RESULTS DOCUMENT: HCPCS | Performed by: FAMILY MEDICINE

## 2023-04-18 PROCEDURE — G8427 DOCREV CUR MEDS BY ELIG CLIN: HCPCS | Performed by: FAMILY MEDICINE

## 2023-04-18 PROCEDURE — 3078F DIAST BP <80 MM HG: CPT | Performed by: FAMILY MEDICINE

## 2023-04-18 PROCEDURE — G8510 SCR DEP NEG, NO PLAN REQD: HCPCS | Performed by: FAMILY MEDICINE

## 2023-04-18 PROCEDURE — 1101F PT FALLS ASSESS-DOCD LE1/YR: CPT | Performed by: FAMILY MEDICINE

## 2023-04-18 PROCEDURE — 1090F PRES/ABSN URINE INCON ASSESS: CPT | Performed by: FAMILY MEDICINE

## 2023-04-18 PROCEDURE — G8536 NO DOC ELDER MAL SCRN: HCPCS | Performed by: FAMILY MEDICINE

## 2023-04-18 PROCEDURE — 3074F SYST BP LT 130 MM HG: CPT | Performed by: FAMILY MEDICINE

## 2023-04-18 RX ORDER — AMLODIPINE AND VALSARTAN 5; 320 MG/1; MG/1
1 TABLET ORAL DAILY
Qty: 90 TABLET | Refills: 3 | Status: SHIPPED | OUTPATIENT
Start: 2023-04-18

## 2023-04-18 NOTE — PROGRESS NOTES
Chief Complaint   Patient presents with    Hypertension     Visit Vitals  BP (!) 171/90 (BP 1 Location: Right upper arm, BP Patient Position: Sitting, BP Cuff Size: Adult)   Pulse 70   Temp 98.4 °F (36.9 °C) (Temporal)   Resp 18   Ht 5' 5\" (1.651 m)   Wt 158 lb 12.8 oz (72 kg)   SpO2 96%   BMI 26.43 kg/m²     1. Have you been to the ER, urgent care clinic since your last visit? Hospitalized since your last visit? No    2. Have you seen or consulted any other health care providers outside of the [address] [place] [address] since your last visit? Include any pap smears or colon screening.  No

## 2023-04-18 NOTE — PROGRESS NOTES
Melva Perez  67 y.o. female  1950  [address]  [place] [address] [address] [facility] [address]  [id number]   [address]: Progress Note  Donny León MD       Encounter Date: 4/18/2023    Chief Complaint   Patient presents with    Hypertension     History of Present Illness   Melva Perez is a 67 y.o. female who presents to clinic today for follow-up on her chronic conditions. Blood pressures have been elevated recently as she had been out of her medication. Was taking Benicar HCT, but noted increased urinary frequency that interfered with work responsibilities. Has been trying to eat healthy. Breast Cancer: Did not follow-up after biopsy. See note in A&P.     3 most recent PHQ Screens 4/18/2023   Little interest or pleasure in doing things Not at all   Feeling down, depressed, irritable, or hopeless Not at all   Total Score PHQ 2 0         Review of Systems   Review of Systems   Constitutional:  Negative for chills, fever and weight loss. HENT: Negative. Eyes:  Negative for blurred vision. Respiratory:  Negative for cough, shortness of breath and wheezing. Cardiovascular:  Negative for chest pain and palpitations. Gastrointestinal:  Negative for abdominal pain, constipation, diarrhea and nausea. Genitourinary:  Negative for dysuria and hematuria. Musculoskeletal:  Negative for back pain, falls and myalgias. More firm area in left chest wall   Skin:  Negative for rash. Neurological:  Negative for dizziness, tremors and headaches. Psychiatric/Behavioral:  Negative for depression. The patient is nervous/anxious. All other systems reviewed and are negative. Vitals/Objective:     Vitals:    04/18/23 1421   BP: (!) 171/90   Pulse: 70   Resp: 18   Temp: 98.4 °F (36.9 °C)   TempSrc: Temporal   SpO2: 96%   Weight: 158 lb 12.8 oz (72 kg)   Height: 5' 5\" (1.651 m)     Body mass index is 26.43 kg/m².     General: Patient alert and oriented and in NAD  HEENT: PER/EOMI, no conjunctival pallor or scleral icterus. No thyromegaly or cervical lymphadenopathy  Heart: Regular rate and rhythm, No murmurs, rubs or gallops. 2+ peripheral pulses  Lungs: Clear to auscultation bilaterally, no wheezing, rales or rhonchi  Abd: +BS, non-tender, non-distended  Ext: No edema  Skin: No rashes or lesions noted on exposed skin,  Psych: Appropriate mood and affect      Assessment and Plan:   1. Essential hypertension  Uncontrolled. She has been out of her medication for quite some time. Also noted she was having to run to the bathroom often at work and would often delay and omit doses so that she wouldn't have this side-effect. Will switch to an ARB-CCB combo instead and can titrate up based upon blood pressure logs from home. Getting updated labs. - CBC W/O DIFF; Future  - METABOLIC PANEL, COMPREHENSIVE; Future  - LIPID PANEL; Future  - HEMOGLOBIN A1C WITH EAG; Future  - MICROALBUMIN, UR, RAND W/ MICROALB/CREAT RATIO; Future  - amLODIPine-valsartan (EXFORGE) 5-320 mg per tablet; Take 1 Tablet by mouth daily. Dispense: 90 Tablet; Refill: 3      2. Malignant neoplasm of central portion of left female breast, unspecified estrogen receptor status ([facility] [address]  We had a long, manuel discussion about her breast cancer. Since her + biopsy in 2017 she has not followed up with the breast surgeon. She notes the loss of family members over that time, uncertainty around wether she would even want to do chemotherapy, radiation or surgery and fear of bridges that prevented her to seek the follow-up. She has noticed tightness in the left breast for which she thinks its \"just a muscle. \"   We discussed options moving forward and she has agreed to undergo follow-up imaging to see how things have changed over the past 6 years. Based on that, she will be better able to make a decision she feels would be right for her.   We did discuss that when breast cancers are treated early, they have an excellent survival rate.     - [facility] [place] MAMMO BI DX INCL CAD; Future    3. Controlled type 2 diabetes mellitus without complication, without long-term current use of insulin ([facility] [address]  Getting updated labs. It has been diet controlled up to this point.    - CBC W/O DIFF; Future  - METABOLIC PANEL, COMPREHENSIVE; Future  - LIPID PANEL; Future  - HEMOGLOBIN A1C WITH EAG; Future  - MICROALBUMIN, UR, RAND W/ MICROALB/CREAT RATIO; Future    4. Osteopenia of multiple sites  Last Dexa was in 2017. Would likely benefit from repeat imaging. Had high risk of Hip and major osteoporotic fracture. I have discussed the diagnosis with the patient and the intended plan as seen in the above orders. she has expressed understanding. The patient has received an after-visit summary and questions were answered concerning future plans. I have discussed medication side effects and warnings with the patient as well. Follow-up and Dispositions    Return in about 3 months (around 7/18/2023). Electronically Signed: Lacy More MD     History   Patients past medical, surgical and family histories were reviewed and updated.     Past Medical History:   Diagnosis Date    Diabetes ([facility] [address]     Gallstone     Hypertension     Invasive carcinoma of breast ([facility] [address] 1/31/2017     Past Surgical History:   Procedure Laterality Date    HX GYN  1984    hysterectomy - Fibroids     Family History   Problem Relation Age of Onset    Bleeding Prob Mother         Anyerism- Brain    Diabetes Father     Other Father         hep C    Stroke Sister     No Known Problems Brother     No Known Problems Brother     No Known Problems Brother     No Known Problems Brother     No Known Problems Brother     Lupus Sister     No Known Problems Sister     No Known Problems Sister     No Known Problems Sister     Migraines Daughter     No Known Problems Daughter      Social History     Tobacco Use    Smoking status: Never    Smokeless tobacco: Never   Substance Use Topics Alcohol use: No    Drug use: No              Current Medications/Allergies     Current Outpatient Medications   Medication Sig Dispense Refill    olmesartan-hydroCHLOROthiazide (BENICAR HCT) 40-12.5 mg per tablet TAKE 1 TABLET EVERY DAY 90 Tablet 0    fluticasone propionate (FLONASE) 50 mcg/actuation nasal spray 2 Sprays by Both Nostrils route daily. 1 Each 0    multivitamin (ONE A DAY) tablet Take 1 Tablet by mouth daily. cholecalciferol (VITAMIN D3) 25 mcg (1,000 unit) cap Take  by mouth daily. loratadine (CLARITIN) 10 mg tablet Take 1 Tab by mouth daily.  30 Tab 1     Allergies   Allergen Reactions    Doxycycline Nausea and Vomiting    Penicillins Rash and Swelling

## 2023-04-19 LAB
ALBUMIN SERPL-MCNC: 3.9 G/DL (ref 3.5–5)
ALBUMIN/GLOB SERPL: 1.5 (ref 1.1–2.2)
ALP SERPL-CCNC: 62 U/L (ref 45–117)
ALT SERPL-CCNC: 18 U/L (ref 12–78)
ANION GAP SERPL CALC-SCNC: 5 MMOL/L (ref 5–15)
AST SERPL-CCNC: 13 U/L (ref 15–37)
BILIRUB SERPL-MCNC: 0.9 MG/DL (ref 0.2–1)
BUN SERPL-MCNC: 12 MG/DL (ref 6–20)
BUN/CREAT SERPL: 15 (ref 12–20)
CALCIUM SERPL-MCNC: 9.3 MG/DL (ref 8.5–10.1)
CHLORIDE SERPL-SCNC: 109 MMOL/L (ref 97–108)
CHOLEST SERPL-MCNC: 186 MG/DL
CO2 SERPL-SCNC: 29 MMOL/L (ref 21–32)
CREAT SERPL-MCNC: 0.81 MG/DL (ref 0.55–1.02)
CREAT UR-MCNC: 281 MG/DL
ERYTHROCYTE [DISTWIDTH] IN BLOOD BY AUTOMATED COUNT: 13 % (ref 11.5–14.5)
EST. AVERAGE GLUCOSE BLD GHB EST-MCNC: 140 MG/DL
GLOBULIN SER CALC-MCNC: 2.6 G/DL (ref 2–4)
GLUCOSE SERPL-MCNC: 83 MG/DL (ref 65–100)
HBA1C MFR BLD: 6.5 % (ref 4–5.6)
HCT VFR BLD AUTO: 40.4 % (ref 35–47)
HDLC SERPL-MCNC: 74 MG/DL
HDLC SERPL: 2.5 (ref 0–5)
HGB BLD-MCNC: 12.6 G/DL (ref 11.5–16)
LDLC SERPL CALC-MCNC: 91.6 MG/DL (ref 0–100)
MCH RBC QN AUTO: 29.7 PG (ref 26–34)
MCHC RBC AUTO-ENTMCNC: 31.2 G/DL (ref 30–36.5)
MCV RBC AUTO: 95.3 FL (ref 80–99)
MICROALBUMIN UR-MCNC: 3.31 MG/DL
MICROALBUMIN/CREAT UR-RTO: 12 MG/G (ref 0–30)
NRBC # BLD: 0 K/UL (ref 0–0.01)
NRBC BLD-RTO: 0 PER 100 WBC
PLATELET # BLD AUTO: 216 K/UL (ref 150–400)
PMV BLD AUTO: 11.1 FL (ref 8.9–12.9)
POTASSIUM SERPL-SCNC: 3.7 MMOL/L (ref 3.5–5.1)
PROT SERPL-MCNC: 6.5 G/DL (ref 6.4–8.2)
RBC # BLD AUTO: 4.24 M/UL (ref 3.8–5.2)
SODIUM SERPL-SCNC: 143 MMOL/L (ref 136–145)
TRIGL SERPL-MCNC: 102 MG/DL (ref ?–150)
VLDLC SERPL CALC-MCNC: 20.4 MG/DL
WBC # BLD AUTO: 6.4 K/UL (ref 3.6–11)

## 2023-04-27 ENCOUNTER — TRANSCRIBE ORDERS (OUTPATIENT)
Facility: HOSPITAL | Age: 73
End: 2023-04-27

## 2023-04-27 DIAGNOSIS — C50.112 MALIGNANT NEOPLASM OF CENTRAL PORTION OF LEFT FEMALE BREAST, UNSPECIFIED ESTROGEN RECEPTOR STATUS (HCC): Primary | ICD-10-CM

## 2023-06-20 RX ORDER — OLMESARTAN MEDOXOMIL AND HYDROCHLOROTHIAZIDE 40/12.5 40; 12.5 MG/1; MG/1
TABLET ORAL
Qty: 90 TABLET | Refills: 3 | Status: SHIPPED | OUTPATIENT
Start: 2023-06-20

## 2023-07-07 ENCOUNTER — OFFICE VISIT (OUTPATIENT)
Age: 73
End: 2023-07-07
Payer: MEDICARE

## 2023-07-07 VITALS
BODY MASS INDEX: 26.66 KG/M2 | RESPIRATION RATE: 18 BRPM | HEART RATE: 84 BPM | TEMPERATURE: 98.6 F | WEIGHT: 160 LBS | SYSTOLIC BLOOD PRESSURE: 132 MMHG | DIASTOLIC BLOOD PRESSURE: 79 MMHG | OXYGEN SATURATION: 98 % | HEIGHT: 65 IN

## 2023-07-07 DIAGNOSIS — M62.838 MUSCLE SPASM: Primary | ICD-10-CM

## 2023-07-07 PROCEDURE — 1090F PRES/ABSN URINE INCON ASSESS: CPT

## 2023-07-07 PROCEDURE — G8427 DOCREV CUR MEDS BY ELIG CLIN: HCPCS

## 2023-07-07 PROCEDURE — G8399 PT W/DXA RESULTS DOCUMENT: HCPCS

## 2023-07-07 PROCEDURE — 3077F SYST BP >= 140 MM HG: CPT

## 2023-07-07 PROCEDURE — 3079F DIAST BP 80-89 MM HG: CPT

## 2023-07-07 PROCEDURE — G8419 CALC BMI OUT NRM PARAM NOF/U: HCPCS

## 2023-07-07 PROCEDURE — 99213 OFFICE O/P EST LOW 20 MIN: CPT

## 2023-07-07 PROCEDURE — 1036F TOBACCO NON-USER: CPT

## 2023-07-07 PROCEDURE — 1123F ACP DISCUSS/DSCN MKR DOCD: CPT

## 2023-07-07 PROCEDURE — 3017F COLORECTAL CA SCREEN DOC REV: CPT

## 2023-07-07 SDOH — ECONOMIC STABILITY: INCOME INSECURITY: HOW HARD IS IT FOR YOU TO PAY FOR THE VERY BASICS LIKE FOOD, HOUSING, MEDICAL CARE, AND HEATING?: NOT HARD AT ALL

## 2023-07-07 SDOH — ECONOMIC STABILITY: HOUSING INSECURITY
IN THE LAST 12 MONTHS, WAS THERE A TIME WHEN YOU DID NOT HAVE A STEADY PLACE TO SLEEP OR SLEPT IN A SHELTER (INCLUDING NOW)?: NO

## 2023-07-07 SDOH — ECONOMIC STABILITY: FOOD INSECURITY: WITHIN THE PAST 12 MONTHS, THE FOOD YOU BOUGHT JUST DIDN'T LAST AND YOU DIDN'T HAVE MONEY TO GET MORE.: NEVER TRUE

## 2023-07-07 SDOH — ECONOMIC STABILITY: FOOD INSECURITY: WITHIN THE PAST 12 MONTHS, YOU WORRIED THAT YOUR FOOD WOULD RUN OUT BEFORE YOU GOT MONEY TO BUY MORE.: NEVER TRUE

## 2023-07-07 ASSESSMENT — PATIENT HEALTH QUESTIONNAIRE - PHQ9
SUM OF ALL RESPONSES TO PHQ QUESTIONS 1-9: 0
1. LITTLE INTEREST OR PLEASURE IN DOING THINGS: 0
2. FEELING DOWN, DEPRESSED OR HOPELESS: 0
SUM OF ALL RESPONSES TO PHQ9 QUESTIONS 1 & 2: 0

## 2023-07-11 ENCOUNTER — OFFICE VISIT (OUTPATIENT)
Age: 73
End: 2023-07-11
Payer: MEDICARE

## 2023-07-11 VITALS
BODY MASS INDEX: 26.63 KG/M2 | DIASTOLIC BLOOD PRESSURE: 81 MMHG | HEART RATE: 85 BPM | SYSTOLIC BLOOD PRESSURE: 143 MMHG | WEIGHT: 160 LBS | OXYGEN SATURATION: 98 %

## 2023-07-11 DIAGNOSIS — M62.838 TRAPEZIUS MUSCLE SPASM: ICD-10-CM

## 2023-07-11 DIAGNOSIS — M54.2 NECK PAIN: ICD-10-CM

## 2023-07-11 DIAGNOSIS — M62.838 MUSCLE SPASM: Primary | ICD-10-CM

## 2023-07-11 PROCEDURE — 1036F TOBACCO NON-USER: CPT | Performed by: FAMILY MEDICINE

## 2023-07-11 PROCEDURE — 3017F COLORECTAL CA SCREEN DOC REV: CPT | Performed by: FAMILY MEDICINE

## 2023-07-11 PROCEDURE — 1090F PRES/ABSN URINE INCON ASSESS: CPT | Performed by: FAMILY MEDICINE

## 2023-07-11 PROCEDURE — 96372 THER/PROPH/DIAG INJ SC/IM: CPT | Performed by: FAMILY MEDICINE

## 2023-07-11 PROCEDURE — 1123F ACP DISCUSS/DSCN MKR DOCD: CPT | Performed by: FAMILY MEDICINE

## 2023-07-11 PROCEDURE — PBSHW PBB SHADOW CHARGE: Performed by: FAMILY MEDICINE

## 2023-07-11 PROCEDURE — 99213 OFFICE O/P EST LOW 20 MIN: CPT | Performed by: FAMILY MEDICINE

## 2023-07-11 PROCEDURE — 3077F SYST BP >= 140 MM HG: CPT | Performed by: FAMILY MEDICINE

## 2023-07-11 PROCEDURE — G8399 PT W/DXA RESULTS DOCUMENT: HCPCS | Performed by: FAMILY MEDICINE

## 2023-07-11 PROCEDURE — 20552 NJX 1/MLT TRIGGER POINT 1/2: CPT | Performed by: FAMILY MEDICINE

## 2023-07-11 PROCEDURE — G8427 DOCREV CUR MEDS BY ELIG CLIN: HCPCS | Performed by: FAMILY MEDICINE

## 2023-07-11 PROCEDURE — 3079F DIAST BP 80-89 MM HG: CPT | Performed by: FAMILY MEDICINE

## 2023-07-11 PROCEDURE — G8419 CALC BMI OUT NRM PARAM NOF/U: HCPCS | Performed by: FAMILY MEDICINE

## 2023-07-11 RX ORDER — DEXAMETHASONE SODIUM PHOSPHATE 4 MG/ML
2 INJECTION, SOLUTION INTRA-ARTICULAR; INTRALESIONAL; INTRAMUSCULAR; INTRAVENOUS; SOFT TISSUE ONCE
Status: COMPLETED | OUTPATIENT
Start: 2023-07-11 | End: 2023-07-11

## 2023-07-11 RX ORDER — LIDOCAINE HYDROCHLORIDE 10 MG/ML
2 INJECTION, SOLUTION INFILTRATION; PERINEURAL ONCE
Status: COMPLETED | OUTPATIENT
Start: 2023-07-11 | End: 2023-07-11

## 2023-07-11 RX ADMIN — LIDOCAINE HYDROCHLORIDE 2 ML: 10 INJECTION, SOLUTION INFILTRATION; PERINEURAL at 09:41

## 2023-07-11 RX ADMIN — DEXAMETHASONE SODIUM PHOSPHATE 2 MG: 4 INJECTION, SOLUTION INTRA-ARTICULAR; INTRALESIONAL; INTRAMUSCULAR; INTRAVENOUS; SOFT TISSUE at 09:39

## 2023-07-11 NOTE — PROGRESS NOTES
Verbal Order for 0.5mL Dexamethasone/2mL 1% Lidocaine right trapezius trigger point Injection per Dr. Raman Mendez. SRAVAN. 0.5mL Dexamethasone/2mL 1% Lidocaine drawn in a 3mL Syringe with 25 1/2in gauge needle. Consent form obtained/signed.

## 2023-07-11 NOTE — PROGRESS NOTES
Patient states she's having neck an lower back pain to the point its hard to bend over. She states her right side of her neck bothers her the most. Her pain level is a 10,she takes pain medication an lidocain patches,an ice an heat used also. She states she woke up one day an was on her back an after that the pain started in her neck an down to her shoulders an back. She is very stiff, an no recent falls.   Chief Complaint   Patient presents with    Pain     Neck an back     Vitals:    07/11/23 0859   BP: (!) 143/81   Pulse: 85   SpO2: 98%

## 2023-07-11 NOTE — PROGRESS NOTES
[address] Sports Medicine      Chief Complaint:   Chief Complaint   Patient presents with    Pain     Neck an back       Subjective:      Radha Bonilla is an 67 y.o. female who presents for evaluation and treatment of right sided neck pain for about 3 weeks. No injury or trauma. No radicular sx. She has tried NSAIDs and tylenol with minimal relief. Past Medical History:   Diagnosis Date    Diabetes ([address] )     Gallstone     Hypertension     Invasive carcinoma of breast ([address] [facility]) 1/31/2017     Family History   Problem Relation Age of Onset    No Known Problems Sister     Diabetes Father     No Known Problems Brother     No Known Problems Brother     No Known Problems Brother     No Known Problems Brother     No Known Problems Sister     Stroke Sister     No Known Problems Sister     Migraines Daughter     Other Father         hep C    No Known Problems Daughter     Bleeding Prob Mother         Anyerism- Brain    No Known Problems Brother     Lupus Sister      Current Outpatient Medications   Medication Sig Dispense Refill    olmesartan-hydroCHLOROthiazide (BENICAR HCT) 40-12.5 MG per tablet TAKE 1 TABLET BY MOUTH EVERY DAY 90 tablet 3    vitamin D 25 MCG (1000 UT) CAPS Take by mouth daily      loratadine (CLARITIN) 10 MG tablet Take 1 tablet by mouth daily      fluticasone (FLONASE) 50 MCG/ACT nasal spray 2 sprays by Nasal route daily (Patient not taking: Reported on 7/7/2023)       No current facility-administered medications for this visit.      Allergies   Allergen Reactions    Doxycycline Nausea And Vomiting    Penicillins Rash and Swelling     Social History     Socioeconomic History    Marital status:      Spouse name: Not on file    Number of children: Not on file    Years of education: Not on file    Highest education level: Not on file   Occupational History    Not on file   Tobacco Use    Smoking status: Never    Smokeless

## 2023-08-10 ENCOUNTER — NURSE TRIAGE (OUTPATIENT)
Dept: OTHER | Facility: CLINIC | Age: 73
End: 2023-08-10

## 2023-08-10 NOTE — TELEPHONE ENCOUNTER
[facility] [place] [facility] [facility]    Received call from [facility] with [employer]. Subjective: Caller states \"I have a cyst on my right wrist and I have numbness in my fingers on my right hand\"       I would like to get the cyst drained    Current Symptoms:     Onset: 1 month ago;     Associated Symptoms:     Pain Severity:  denies    Temperature:      What has been tried:     LMP: NA Pregnant: NA    Recommended disposition: See PCP within 3 Days    Care advice provided, patient verbalizes understanding; denies any other questions or concerns; instructed to call back for any new or worsening symptoms. Patient/Caller agrees with recommended disposition; writer provided warm transfer to Alan Almonte at [facility] for appointment scheduling    Attention Provider: Thank you for allowing me to participate in the care of your patient. The patient was connected to triage in response to information provided to the ECC/PSC. Please do not respond through this encounter as the response is not directed to a shared pool.     Reason for Disposition   Numbness or tingling in one or both hands is a chronic symptom (recurrent or ongoing problem lasting > 4 weeks)    Protocols used: Neurologic Deficit-ADULT-[place]

## 2023-08-22 ENCOUNTER — NURSE TRIAGE (OUTPATIENT)
Dept: OTHER | Facility: CLINIC | Age: 73
End: 2023-08-22

## 2023-08-22 ENCOUNTER — OFFICE VISIT (OUTPATIENT)
Age: 73
End: 2023-08-22
Payer: MEDICARE

## 2023-08-22 VITALS
BODY MASS INDEX: 27.16 KG/M2 | TEMPERATURE: 98.4 F | OXYGEN SATURATION: 97 % | HEIGHT: 65 IN | DIASTOLIC BLOOD PRESSURE: 86 MMHG | WEIGHT: 163 LBS | RESPIRATION RATE: 17 BRPM | HEART RATE: 81 BPM | SYSTOLIC BLOOD PRESSURE: 172 MMHG

## 2023-08-22 DIAGNOSIS — M67.40 GANGLION CYST: Primary | ICD-10-CM

## 2023-08-22 PROCEDURE — 1036F TOBACCO NON-USER: CPT | Performed by: FAMILY MEDICINE

## 2023-08-22 PROCEDURE — G8419 CALC BMI OUT NRM PARAM NOF/U: HCPCS | Performed by: FAMILY MEDICINE

## 2023-08-22 PROCEDURE — G8428 CUR MEDS NOT DOCUMENT: HCPCS | Performed by: FAMILY MEDICINE

## 2023-08-22 PROCEDURE — 1123F ACP DISCUSS/DSCN MKR DOCD: CPT | Performed by: FAMILY MEDICINE

## 2023-08-22 PROCEDURE — 99213 OFFICE O/P EST LOW 20 MIN: CPT

## 2023-08-22 PROCEDURE — 3017F COLORECTAL CA SCREEN DOC REV: CPT | Performed by: FAMILY MEDICINE

## 2023-08-22 PROCEDURE — 1090F PRES/ABSN URINE INCON ASSESS: CPT | Performed by: FAMILY MEDICINE

## 2023-08-22 PROCEDURE — 3077F SYST BP >= 140 MM HG: CPT | Performed by: FAMILY MEDICINE

## 2023-08-22 PROCEDURE — 3079F DIAST BP 80-89 MM HG: CPT | Performed by: FAMILY MEDICINE

## 2023-08-22 PROCEDURE — G8399 PT W/DXA RESULTS DOCUMENT: HCPCS | Performed by: FAMILY MEDICINE

## 2023-08-22 ASSESSMENT — PATIENT HEALTH QUESTIONNAIRE - PHQ9
SUM OF ALL RESPONSES TO PHQ9 QUESTIONS 1 & 2: 0
SUM OF ALL RESPONSES TO PHQ QUESTIONS 1-9: 0
2. FEELING DOWN, DEPRESSED OR HOPELESS: 0
1. LITTLE INTEREST OR PLEASURE IN DOING THINGS: 0
SUM OF ALL RESPONSES TO PHQ QUESTIONS 1-9: 0

## 2023-08-22 NOTE — TELEPHONE ENCOUNTER
[facility] [place]    Received call from Fabio Gupta at [facility] with [facility]. Subjective: Caller states \"Reschedule appointment\"     Current Symptoms:   Cyst on the top of the right wrist that is the size of a quarter  \"When I hold things my fingers feel gritty and tingly\". \"My right hand feels like it's tight\"  Denies swelling    Onset: 2 weeks ago; worsening    Pain Severity: 0/10    Temperature:  Denies    What has been tried: Nothing    Recommended disposition: See in Office Today    Care advice provided, patient verbalizes understanding; denies any other questions or concerns; instructed to call back for any new or worsening symptoms. Patient/Caller agrees with recommended disposition; writer provided warm transfer to [facility] for appointment scheduling    Attention Provider: Thank you for allowing me to participate in the care of your patient. The patient was connected to triage in response to information provided to the ECC/PSC. Please do not respond through this encounter as the response is not directed to a shared pool.       Reason for Disposition   Boil and diabetes mellitus or weak immune system (e.g., HIV positive, cancer chemo, splenectomy, organ transplant, chronic steroids)    Protocols used: Boil (Skin Abscess)-ADULT-[place]

## 2023-08-22 NOTE — PROGRESS NOTES
[facility] [place], [address] [facility]   Office [phone], Fax [phone]      Chief Complaint:   Wanda Laureano is a 68 y.o. female that presents for:     Chief Complaint   Patient presents with    Cyst     Right wrist times 3 weeks. Assessment/Plan:   Nico Barriga was seen today for cyst.    Diagnoses and all orders for this visit:    Ganglion cyst: 1.5 cm x 1.5 cm fluctuant cyst over L wrist c/f ganglion cyst. Aspiration attempted and no fluid was able to be drained from the cyst. Ddx could also include an epidermoid cyst. Counseled that 50% of cysts resolve spontaneously. - Patient requesting removal of the cyst. Surgical removal likely necessary to remove the cyst with its underlying stalk to prevent recurrence. Will refer to Ortho. Follow up:   Return if symptoms worsen or fail to improve. Subjective:   HPI:  Wanda Laureano is a 68 y.o. female who presents to clinic for evaluation of ganglion cyst on her left wrist.  It has been present for 1 month. It has continued to get larger and increasingly tender. It does not interfere with her day-to-day life, but can at times be embarrassing. She does have mild numbness and her thumb, first, and second fingers of that hand. She works at [employer], and her job involves lots of carrying and lifting items. Denies redness, drainage. Health Maintenance:  Health Maintenance Due   Topic Date Due    Pneumococcal 65+ years Vaccine (1 - PCV) Never done    Diabetic retinal exam  Never done    DTaP/Tdap/Td vaccine (1 - Tdap) Never done    Breast cancer screen  Never done    Shingles vaccine (1 of 2) Never done    COVID-19 Vaccine (3 - Booster for Pfizer series) 07/13/2021    Annual Wellness Visit (AWV)  05/23/2022    Diabetic foot exam  06/01/2023    Colorectal Cancer Screen  06/03/2023    Flu vaccine (1) 08/01/2023      ROS:   Patient otherwise denies fevers, chills.     Past medical history, social history, and medications personally

## 2024-06-20 ENCOUNTER — TELEPHONE (OUTPATIENT)
Age: 74
End: 2024-06-20

## 2024-06-20 NOTE — TELEPHONE ENCOUNTER
Contacted patient to schedule diabetes follow up with Julio Cesar Garcia MD. Left Message with patient to return call to writer at [phone] at their convenience.

## 2024-07-22 ENCOUNTER — TELEPHONE (OUTPATIENT)
Age: 74
End: 2024-07-22

## 2024-07-22 NOTE — TELEPHONE ENCOUNTER
This writer attempted to contact pt to reschedule pt's in person appt with Dr. Garcia, due to Dr. Garcia's being sick.      Pt's home phone number is disconnected. Pt did not answer mobile phone; a voice message was left and [employer] message was sent.

## 2024-08-22 DIAGNOSIS — I10 ESSENTIAL (PRIMARY) HYPERTENSION: Primary | ICD-10-CM

## 2024-08-23 RX ORDER — OLMESARTAN MEDOXOMIL AND HYDROCHLOROTHIAZIDE 40/12.5 40; 12.5 MG/1; MG/1
TABLET ORAL
Qty: 30 TABLET | Refills: 0 | Status: SHIPPED | OUTPATIENT
Start: 2024-08-23

## 2024-08-30 ENCOUNTER — TELEPHONE (OUTPATIENT)
Age: 74
End: 2024-08-30

## 2024-08-30 DIAGNOSIS — I10 ESSENTIAL (PRIMARY) HYPERTENSION: ICD-10-CM

## 2024-08-30 NOTE — TELEPHONE ENCOUNTER
Pt has scheduled a AWV with you for 10/03, but her medication will run out on 9/25. She asked that you send a refill of olmesartan-hydroCHLOROthiazide (BENICAR HCT) 40-12.5 MG per tablet  to last until her appt.    Thank you

## 2024-09-02 RX ORDER — OLMESARTAN MEDOXOMIL AND HYDROCHLOROTHIAZIDE 40/12.5 40; 12.5 MG/1; MG/1
1 TABLET ORAL DAILY
Qty: 30 TABLET | Refills: 0 | Status: SHIPPED | OUTPATIENT
Start: 2024-09-02

## 2024-10-03 ENCOUNTER — OFFICE VISIT (OUTPATIENT)
Age: 74
End: 2024-10-03
Payer: MEDICARE

## 2024-10-03 VITALS
BODY MASS INDEX: 24.76 KG/M2 | OXYGEN SATURATION: 97 % | DIASTOLIC BLOOD PRESSURE: 72 MMHG | SYSTOLIC BLOOD PRESSURE: 125 MMHG | HEART RATE: 80 BPM | RESPIRATION RATE: 18 BRPM | HEIGHT: 65 IN | WEIGHT: 148.6 LBS | TEMPERATURE: 98.2 F

## 2024-10-03 DIAGNOSIS — Z23 ENCOUNTER FOR IMMUNIZATION: ICD-10-CM

## 2024-10-03 DIAGNOSIS — C50.112 MALIGNANT NEOPLASM OF CENTRAL PORTION OF LEFT FEMALE BREAST, UNSPECIFIED ESTROGEN RECEPTOR STATUS (HCC): ICD-10-CM

## 2024-10-03 DIAGNOSIS — Z12.11 COLON CANCER SCREENING: ICD-10-CM

## 2024-10-03 DIAGNOSIS — Z00.00 MEDICARE ANNUAL WELLNESS VISIT, SUBSEQUENT: Primary | ICD-10-CM

## 2024-10-03 DIAGNOSIS — I10 ESSENTIAL HYPERTENSION: ICD-10-CM

## 2024-10-03 DIAGNOSIS — E11.9 CONTROLLED TYPE 2 DIABETES MELLITUS WITHOUT COMPLICATION, WITHOUT LONG-TERM CURRENT USE OF INSULIN (HCC): ICD-10-CM

## 2024-10-03 LAB
ALBUMIN, URINE, POC: 30 MG/L
CREATININE, URINE, POC: 100 MG/DL
MICROALB/CREAT RATIO, POC: <30 MG/G

## 2024-10-03 PROCEDURE — 3078F DIAST BP <80 MM HG: CPT | Performed by: FAMILY MEDICINE

## 2024-10-03 PROCEDURE — 1090F PRES/ABSN URINE INCON ASSESS: CPT | Performed by: FAMILY MEDICINE

## 2024-10-03 PROCEDURE — 1036F TOBACCO NON-USER: CPT | Performed by: FAMILY MEDICINE

## 2024-10-03 PROCEDURE — 82044 UR ALBUMIN SEMIQUANTITATIVE: CPT | Performed by: FAMILY MEDICINE

## 2024-10-03 PROCEDURE — G8427 DOCREV CUR MEDS BY ELIG CLIN: HCPCS | Performed by: FAMILY MEDICINE

## 2024-10-03 PROCEDURE — 1123F ACP DISCUSS/DSCN MKR DOCD: CPT | Performed by: FAMILY MEDICINE

## 2024-10-03 PROCEDURE — 3044F HG A1C LEVEL LT 7.0%: CPT | Performed by: FAMILY MEDICINE

## 2024-10-03 PROCEDURE — PBSHW AMB POC URINE, MICROALBUMIN, SEMIQUANT (3 RESULTS): Performed by: FAMILY MEDICINE

## 2024-10-03 PROCEDURE — 90653 IIV ADJUVANT VACCINE IM: CPT | Performed by: FAMILY MEDICINE

## 2024-10-03 PROCEDURE — G8420 CALC BMI NORM PARAMETERS: HCPCS | Performed by: FAMILY MEDICINE

## 2024-10-03 PROCEDURE — G8399 PT W/DXA RESULTS DOCUMENT: HCPCS | Performed by: FAMILY MEDICINE

## 2024-10-03 PROCEDURE — G8482 FLU IMMUNIZE ORDER/ADMIN: HCPCS | Performed by: FAMILY MEDICINE

## 2024-10-03 PROCEDURE — PBSHW INFLUENZA, FLUAD TRIVALENT, (AGE 65 Y+), IM, PRESERVATIVE FREE, 0.5ML: Performed by: FAMILY MEDICINE

## 2024-10-03 PROCEDURE — 99214 OFFICE O/P EST MOD 30 MIN: CPT | Performed by: FAMILY MEDICINE

## 2024-10-03 PROCEDURE — 3017F COLORECTAL CA SCREEN DOC REV: CPT | Performed by: FAMILY MEDICINE

## 2024-10-03 PROCEDURE — G0439 PPPS, SUBSEQ VISIT: HCPCS | Performed by: FAMILY MEDICINE

## 2024-10-03 PROCEDURE — 3074F SYST BP LT 130 MM HG: CPT | Performed by: FAMILY MEDICINE

## 2024-10-03 PROCEDURE — 2022F DILAT RTA XM EVC RTNOPTHY: CPT | Performed by: FAMILY MEDICINE

## 2024-10-03 RX ORDER — OLMESARTAN MEDOXOMIL AND HYDROCHLOROTHIAZIDE 40/12.5 40; 12.5 MG/1; MG/1
1 TABLET ORAL DAILY
Qty: 30 TABLET | Refills: 0 | Status: SHIPPED | OUTPATIENT
Start: 2024-10-03 | End: 2024-10-03 | Stop reason: SDUPTHER

## 2024-10-03 RX ORDER — OLMESARTAN MEDOXOMIL AND HYDROCHLOROTHIAZIDE 40/12.5 40; 12.5 MG/1; MG/1
1 TABLET ORAL DAILY
Qty: 90 TABLET | Refills: 3 | Status: SHIPPED | OUTPATIENT
Start: 2024-10-03

## 2024-10-03 SDOH — ECONOMIC STABILITY: FOOD INSECURITY: WITHIN THE PAST 12 MONTHS, THE FOOD YOU BOUGHT JUST DIDN'T LAST AND YOU DIDN'T HAVE MONEY TO GET MORE.: NEVER TRUE

## 2024-10-03 SDOH — ECONOMIC STABILITY: FOOD INSECURITY: WITHIN THE PAST 12 MONTHS, YOU WORRIED THAT YOUR FOOD WOULD RUN OUT BEFORE YOU GOT MONEY TO BUY MORE.: NEVER TRUE

## 2024-10-03 SDOH — ECONOMIC STABILITY: INCOME INSECURITY: HOW HARD IS IT FOR YOU TO PAY FOR THE VERY BASICS LIKE FOOD, HOUSING, MEDICAL CARE, AND HEATING?: SOMEWHAT HARD

## 2024-10-03 ASSESSMENT — LIFESTYLE VARIABLES
HOW MANY STANDARD DRINKS CONTAINING ALCOHOL DO YOU HAVE ON A TYPICAL DAY: PATIENT DOES NOT DRINK
HOW OFTEN DO YOU HAVE A DRINK CONTAINING ALCOHOL: NEVER

## 2024-10-03 ASSESSMENT — PATIENT HEALTH QUESTIONNAIRE - PHQ9
SUM OF ALL RESPONSES TO PHQ QUESTIONS 1-9: 0
2. FEELING DOWN, DEPRESSED OR HOPELESS: NOT AT ALL
1. LITTLE INTEREST OR PLEASURE IN DOING THINGS: NOT AT ALL
SUM OF ALL RESPONSES TO PHQ QUESTIONS 1-9: 0
SUM OF ALL RESPONSES TO PHQ9 QUESTIONS 1 & 2: 0

## 2024-10-03 NOTE — PROGRESS NOTES
Identified pt with two pt identifiers(name and ). Reviewed record in preparation for visit and have obtained necessary documentation.  Chief Complaint   Patient presents with    Medicare AWV        Health Maintenance Due   Topic    Pneumococcal 65+ years Vaccine (1 of 2 - PCV)    Diabetic retinal exam     DTaP/Tdap/Td vaccine (1 - Tdap)    Breast cancer screen     Shingles vaccine (1 of 2)    Respiratory Syncytial Virus (RSV) Pregnant or age 60 yrs+ (1 - 1-dose 60+ series)    Diabetic foot exam     Colorectal Cancer Screen     Annual Wellness Visit (Medicare Advantage)     A1C test (Diabetic or Prediabetic)     Diabetic Alb to Cr ratio (uACR) test     Lipids     GFR test (Diabetes, CKD 3-4, OR last GFR 15-59)     Flu vaccine (1)    Depression Screen     COVID-19 Vaccine (3 - 2023-24 season)       Vitals:    10/03/24 0914   BP: 125/72   Site: Right Upper Arm   Position: Sitting   Cuff Size: Medium Adult   Pulse: 80   Resp: 18   Temp: 98.2 °F (36.8 °C)   TempSrc: Oral   SpO2: 97%   Weight: 67.4 kg (148 lb 9.6 oz)   Height: 1.651 m (5' 5\")         \"Have you been to the ER, urgent care clinic since your last visit?  Hospitalized since your last visit?\"    NO    “Have you seen or consulted any other health care providers outside of [facility] since your last visit?”    NO    Have you had a mammogram?”   NO    No breast cancer screening on file         “Have you had a colorectal cancer screening such as a colonoscopy/FIT/Cologuard?    NO    No colonoscopy on file  No cologuard on file  Date of last FIT: 6/3/2022   No flexible sigmoidoscopy on file         Click Here for Release of Records Request     This patient is accompanied in the office by her self.  I have received verbal consent from Ramya Parker to discuss any/all medical information while they are present in the room.  
also reports no issues with coughing or sneezing.    FAMILY HISTORY  Her sisters have had little masses taken out, but they have always been noncancerous.      Patient's complete Health Risk Assessment and screening values have been reviewed and are found in Flowsheets. The following problems were reviewed today and where indicated follow up appointments were made and/or referrals ordered.    Positive Risk Factor Screenings with Interventions:                Inactivity:  On average, how many days per week do you engage in moderate to strenuous exercise (like a brisk walk)?: 2 days (!) Abnormal  On average, how many minutes do you engage in exercise at this level?: 30 min  Interventions:  Recommendations: patient agrees to increase physical activity as follows: small increases, with goal to gradually reach 150mg weekly.         Vision Screen:  Do you have difficulty driving, watching TV, or doing any of your daily activities because of your eyesight?: No  Have you had an eye exam within the past year?: (!) No  Interventions:   Patient encouraged to make appointment with their eye specialist      Advanced Directives:  Do you have a Living Will?: (!) No  Intervention:  has NO advanced directive - information provided         Objective   Vitals:    10/03/24 0914   BP: 125/72   Site: Right Upper Arm   Position: Sitting   Cuff Size: Medium Adult   Pulse: 80   Resp: 18   Temp: 98.2 °F (36.8 °C)   TempSrc: Oral   SpO2: 97%   Weight: 67.4 kg (148 lb 9.6 oz)   Height: 1.651 m (5' 5\")      Body mass index is 24.73 kg/m².      Physical Exam  Vitals reviewed.   Constitutional:       General: She is not in acute distress.     Appearance: She is not ill-appearing.   HENT:      Right Ear: Tympanic membrane normal.      Left Ear: Tympanic membrane normal.   Eyes:      Conjunctiva/sclera: Conjunctivae normal.   Cardiovascular:      Rate and Rhythm: Normal rate and regular rhythm.   Pulmonary:      Effort: Pulmonary effort is normal.

## 2024-10-03 NOTE — PATIENT INSTRUCTIONS
Learning About Being Active as an Older Adult  Why is being active important as you get older?     Being active is one of the best things you can do for your health. And it's never too late to start. Being active--or getting active, if you aren't already--has definite benefits. It can:  Give you more energy,  Keep your mind sharp.  Improve balance to reduce your risk of falls.  Help you manage chronic illness with fewer medicines.  No matter how old you are, how fit you are, or what health problems you have, there is a form of activity that will work for you. And the more physical activity you can do, the better your overall health will be.  What kinds of activity can help you stay healthy?  Being more active will make your daily activities easier. Physical activity includes planned exercise and things you do in daily life. There are four types of activity:  Aerobic.  Doing aerobic activity makes your heart and lungs strong.  Includes walking, dancing, and gardening.  Aim for at least 2½ hours spread throughout the week.  It improves your energy and can help you sleep better.  Muscle-strengthening.  This type of activity can help maintain muscle and strengthen bones.  Includes climbing stairs, using resistance bands, and lifting or carrying heavy loads.  Aim for at least twice a week.  It can help protect the knees and other joints.  Stretching.  Stretching gives you better range of motion in joints and muscles.  Includes upper arm stretches, calf stretches, and gentle yoga.  Aim for at least twice a week, preferably after your muscles are warmed up from other activities.  It can help you function better in daily life.  Balancing.  This helps you stay coordinated and have good posture.  Includes heel-to-toe walking, theresa chi, and certain types of yoga.  Aim for at least 3 days a week.  It can reduce your risk of falling.  Even if you have a hard time meeting the recommendations, it's better to be more active

## 2024-10-05 LAB
ALBUMIN SERPL-MCNC: 4.3 G/DL (ref 3.5–5)
ALBUMIN/GLOB SERPL: 1.4 (ref 1.1–2.2)
ALP SERPL-CCNC: 70 U/L (ref 45–117)
ALT SERPL-CCNC: 16 U/L (ref 12–78)
ANION GAP SERPL CALC-SCNC: 3 MMOL/L (ref 2–12)
AST SERPL-CCNC: 12 U/L (ref 15–37)
BILIRUB SERPL-MCNC: 1.1 MG/DL (ref 0.2–1)
BUN SERPL-MCNC: 15 MG/DL (ref 6–20)
BUN/CREAT SERPL: 16 (ref 12–20)
CALCIUM SERPL-MCNC: 10.3 MG/DL (ref 8.5–10.1)
CHLORIDE SERPL-SCNC: 106 MMOL/L (ref 97–108)
CHOLEST SERPL-MCNC: 222 MG/DL
CO2 SERPL-SCNC: 32 MMOL/L (ref 21–32)
CREAT SERPL-MCNC: 0.91 MG/DL (ref 0.55–1.02)
ERYTHROCYTE [DISTWIDTH] IN BLOOD BY AUTOMATED COUNT: 13.2 % (ref 11.5–14.5)
EST. AVERAGE GLUCOSE BLD GHB EST-MCNC: 131 MG/DL
GLOBULIN SER CALC-MCNC: 3 G/DL (ref 2–4)
GLUCOSE SERPL-MCNC: 182 MG/DL (ref 65–100)
HBA1C MFR BLD: 6.2 % (ref 4–5.6)
HCT VFR BLD AUTO: 45.6 % (ref 35–47)
HDLC SERPL-MCNC: 80 MG/DL
HDLC SERPL: 2.8 (ref 0–5)
HGB BLD-MCNC: 14.2 G/DL (ref 11.5–16)
LDLC SERPL CALC-MCNC: 119.6 MG/DL (ref 0–100)
MCH RBC QN AUTO: 29 PG (ref 26–34)
MCHC RBC AUTO-ENTMCNC: 31.1 G/DL (ref 30–36.5)
MCV RBC AUTO: 93.3 FL (ref 80–99)
NRBC # BLD: 0 K/UL (ref 0–0.01)
NRBC BLD-RTO: 0 PER 100 WBC
PLATELET # BLD AUTO: 232 K/UL (ref 150–400)
PMV BLD AUTO: 10.7 FL (ref 8.9–12.9)
POTASSIUM SERPL-SCNC: 3.7 MMOL/L (ref 3.5–5.1)
PROT SERPL-MCNC: 7.3 G/DL (ref 6.4–8.2)
RBC # BLD AUTO: 4.89 M/UL (ref 3.8–5.2)
SODIUM SERPL-SCNC: 141 MMOL/L (ref 136–145)
TRIGL SERPL-MCNC: 112 MG/DL
VLDLC SERPL CALC-MCNC: 22.4 MG/DL
WBC # BLD AUTO: 5.7 K/UL (ref 3.6–11)

## 2025-05-07 NOTE — PROGRESS NOTES
1. Increase water intake to 40-60 ounces (2 liters) per day or enough to keep urine clear to pale yellow.  2. Start probiotic for recurrent UTIs  3. Check cystoscopy     Milad Guzman  77 y.o. female  1950  [address] [facility]  [id number]   [address]: Progress Note  Brendan Sidhu MD       Encounter Date: 1/31/2017    Chief Complaint   Patient presents with    Elevated Blood Pressure     History of Present Illness   Milad Guzman is a 77 y.o. female who presents to clinic today for follow-up on hypertension and to get breast biopsy results. HTn: Doing well on Toprol XL. No further lip swelling noted and hives have gone away completely. Denies Chest Pain, palpitations, lightheadedness, dizziness. Breast Pathology:  Recent abnormal mammogram and underwent biopsy of a left breast mass. Reviewed results below and explained its implications. Discussed this is just the first step and appointment made with breast surgery before patient leaves today. pathology  FINDINGS: The pathology report reveals:  1. Left breast, sample 1, core biopsies  Invasive mammary carcinoma with lobular features. 2. Left breast, sample 2, core biopsies  Invasive mammary carcinoma with lobular features  Lobular carcinoma in situ     ER 95% positive; KS 95% positive; HER-2/kranthi equivocal (2+).     IMPRESSION: This is a concordant result. The patient elected to hear results  from her primary physician. I am available for further questions as needed.     RECOMMENDATION: Surgical excision. Referral to a subspecialist breast surgeon. Further, breast MRI is recommended to evaluate extent of disease and the  possibility of contralateral disease.s  Review of Systems   Review of Systems   Constitutional: Negative for chills, fever, malaise/fatigue and weight loss. Respiratory: Negative for cough, shortness of breath and wheezing. Cardiovascular: Negative for chest pain and palpitations. Skin: Negative for rash. Neurological: Negative for headaches. All other systems reviewed and are negative.       Vitals/Objective:     Vitals:    01/31/17 1439 BP: 154/78   Pulse: 81   Temp: 98.7 °F (37.1 °C)   TempSrc: Oral   SpO2: 98%   Weight: 151 lb (68.5 kg)   Height: 5' 3\" (1.6 m)     Body mass index is 26.75 kg/(m^2). Physical Exam   Cardiovascular: Normal rate, regular rhythm and intact distal pulses. Exam reveals no gallop. No murmur heard. Pulmonary/Chest: Breath sounds normal. No respiratory distress. She has no wheezes. She has no rales. Abdominal: Bowel sounds are normal.       Assessment and Plan:   1. Invasive carcinoma of breast [facility])  Discussed pathology results. Appt scheduled at the breast center, Tuesday February 7th @4:30PM with Dr. David Fung. .  - REFERRAL TO BREAST SURGERY    2. Type 2 diabetes mellitus without complication, without long-term current use of insulin (HCC)  - metFORMIN (GLUCOPHAGE) 500 mg tablet; Take  by mouth two (2) times daily (with meals). 3. Essential hypertension  BP at home in the 130/80s and at goal.  Will monitor on current medication. Changed from lisinopril and amlodipine due to angioedema    I have spent 30 minutes face to face with patient with> 50% of the time spent on counseling and coordination of care. I have discussed the diagnosis with the patient and the intended plan as seen in the above orders. she has expressed understanding. The patient has received an after-visit summary and questions were answered concerning future plans. I have discussed medication side effects and warnings with the patient as well. Follow-up Disposition: Not on File    Electronically Signed: Sosa Quiroz MD     History   Patients past medical, surgical and family histories were reviewed and updated.     Past Medical History   Diagnosis Date    Diabetes ([facility] [address]     Gallstone     Hypertension      Past Surgical History   Procedure Laterality Date    Hx gyn  1984     hysterectomy - Fibroids     Family History   Problem Relation Age of Onset    Bleeding Prob Mother      Anyerism- Brain    Diabetes Father     Other Father      hep C    Stroke Sister     No Known Problems Brother     No Known Problems Brother     No Known Problems Brother     No Known Problems Brother     No Known Problems Brother     Lupus Sister     No Known Problems Sister     No Known Problems Sister     No Known Problems Sister     Migraines Daughter     No Known Problems Daughter      Social History     Social History    Marital status:      Spouse name: N/A    Number of children: N/A    Years of education: N/A     Occupational History    Not on file. Social History Main Topics    Smoking status: Never Smoker    Smokeless tobacco: Not on file    Alcohol use No    Drug use: No    Sexual activity: Not Currently     Birth control/ protection: Surgical     Other Topics Concern    Not on file     Social History Narrative            Current Medications/Allergies     Current Outpatient Prescriptions   Medication Sig Dispense Refill    metFORMIN (GLUCOPHAGE) 500 mg tablet Take  by mouth two (2) times daily (with meals).  metoprolol succinate (TOPROL-XL) 25 mg XL tablet Take 1 Tab by mouth daily.  30 Tab 2     Allergies   Allergen Reactions    Penicillins Rash and Swelling

## 2025-05-27 NOTE — MR AVS SNAPSHOT
Visit Information Date & Time Provider Department Dept. Phone Encounter #  
 1/11/2017  2:00 PM Aldair Alcala, [facility] [phone] [id number] Follow-up Instructions Return in about 1 week (around 1/18/2017), or if symptoms worsen or fail to improve, for Follow-up on Angioedema. Your Appointments 1/27/2017 10:25 AM  
ROUTINE CARE with Elneo Berman MD  
[facility] [address]) Appt Note: f/u for blood pressure [address] [facility] [address] [facility]  
[phone]  
  
   
 [address] [facility] [phone] Upcoming Health Maintenance Date Due Hepatitis C Screening 1950 FOOT EXAM Q1 8/15/1960 EYE EXAM RETINAL OR DILATED Q1 8/15/1960 DTaP/Tdap/Td series (1 - Tdap) 8/15/1971 FOBT Q 1 YEAR AGE 50-75 8/15/2000 ZOSTER VACCINE AGE 60> 8/15/2010 GLAUCOMA SCREENING Q2Y 8/15/2015 OSTEOPOROSIS SCREENING (DEXA) 8/15/2015 MEDICARE YEARLY EXAM 8/15/2015 HEMOGLOBIN A1C Q6M 5/7/2017 MICROALBUMIN Q1 11/7/2017 LIPID PANEL Q1 11/7/2017 Pneumococcal 65+ Low/Medium Risk (2 of 2 - PPSV23) 11/18/2017 BREAST CANCER SCRN MAMMOGRAM 1/9/2019 Allergies as of 1/11/2017  Review Complete On: 1/11/2017 By: Tommie Irving LPN Severity Noted Reaction Type Reactions Penicillins  11/07/2016   Systemic Rash, Swelling Current Immunizations  Never Reviewed Name Date Influenza High Dose Vaccine PF 11/7/2016 Not reviewed this visit You Were Diagnosed With   
  
 Codes Comments Angioedema of lips, subsequent encounter    -  Primary ICD-10-CM: T78. 3XXD ICD-9-CM: V58.89, 995.1 Vitals BP Pulse Temp Resp Height(growth percentile) Weight(growth percentile) 144/90 79 98 °F (36.7 °C) (Oral) 16 5' 3\" (1.6 m) 149 lb (67.6 kg) SpO2 BMI OB Status Smoking Status 99% 26.39 kg/m2 Hysterectomy Never Smoker Vitals History BMI and BSA Data Body Mass Index Body Surface Area  
 26.39 kg/m 2 1.73 m 2 Preferred Pharmacy Pharmacy Name Phone [facility]/PHARMACY #[id number]- [address], [address] [place] [address] Road [phone] Your Updated Medication List  
  
   
This list is accurate as of: 1/11/17  2:42 PM.  Always use your most recent med list.  
  
  
  
  
 metoprolol succinate 25 mg XL tablet Commonly known as:  TOPROL-XL Take 1 Tab by mouth daily. predniSONE 20 mg tablet Commonly known as:  Madison Fossa Take 3 Tabs by mouth daily (with breakfast) for 5 days. Prescriptions Sent to Pharmacy Refills  
 metoprolol succinate (TOPROL-XL) 25 mg XL tablet 2 Sig: Take 1 Tab by mouth daily. Class: Normal  
 Pharmacy: [facility]/pharmacy #[id number]- [address] Ph #: [phone] Route: Oral  
 predniSONE (DELTASONE) 20 mg tablet 0 Sig: Take 3 Tabs by mouth daily (with breakfast) for 5 days. Class: Normal  
 Pharmacy: [facility]/pharmacy #[id number] Ph #: [phone] Route: Oral  
  
We Performed the Following REFERRAL TO ALLERGY [REF5 Custom] Comments:  
 Please evaluate patient for persistent angioedema. Follow-up Instructions Return in about 1 week (around 1/18/2017), or if symptoms worsen or fail to improve, for Follow-up on Angioedema. To-Do List   
 01/25/2017 9:30 AM  
  Appointment with Ramy Person at [address] ([phone]) Referral Information Referral ID Referred By Referred To  
  
 [id number] [address] [place], Josie Ramirez  Phone: [phone] Fax: [phone] Visits Status Start Date End Date 1 New Request 1/11/17 1/11/18  If your referral has a status of pending review or denied, additional information will be sent to support the outcome of this decision. Patient Instructions Angioedema: Care Instructions Your Care Instructions Angioedema is an allergic reaction. It causes swelling and welts in the deep layers of the skin. Angioedema can sometimes occur along with hives. Hives are an allergic reaction in the outer layers of the skin. Angioedema can range from mild to severe. Painful welts can develop on the face. Angioedema can also occur on other parts of the body. In severe cases, the inside of the throat can swell and make it hard to breathe. Many things can cause this condition, including foods, insect bites, and medicines (such as aspirin and some blood pressure medicines). It also can run in families. Sometimes you may know what caused the reaction, but other times you may not know. Follow-up care is a key part of your treatment and safety. Be sure to make and go to all appointments, and call your doctor if you are having problems. It's also a good idea to know your test results and keep a list of the medicines you take. How can you care for yourself at home? · Take your medicines exactly as prescribed. Call your doctor if you think you are having a problem with your medicine. You will get more details on the specific medicines your doctor prescribes. Some medicines used to treat angioedema can make you too sleepy to drive safely. Do not drive if you take medicine that may make you sleepy. · Avoid foods or medicine that may have triggered the swelling. · For comfort: ¨ Try taking a cool bath. Or place a cool, wet towel on the swollen area. ¨ Avoid hot baths and showers. ¨ Wear loose clothing. · Your doctor may prescribe a shot of epinephrine to carry with you in case you have a severe reaction. Learn how to give yourself the shot and keep it with you at all times. Make sure it has not . When should you call for help? Give an epinephrine shot if: · You think you are having a severe allergic reaction. · You have symptoms in more than one body area, such as mild nausea and an itchy mouth. After giving an epinephrine shot call 911, even if you feel better. Call 911 if: 
· You have symptoms of a severe allergic reaction. These may include: 
¨ Sudden raised, red areas (hives) all over your body. ¨ Swelling of the throat, mouth, lips, or tongue. ¨ Trouble breathing. ¨ Passing out (losing consciousness). Or you may feel very lightheaded or suddenly feel weak, confused, or restless. · You have been given an epinephrine shot, even if you feel better. Call your doctor now or seek immediate medical care if: 
· You have symptoms of an allergic reaction, such as: ¨ A rash or hives (raised, red areas on the skin). ¨ Itching. ¨ Swelling. ¨ Belly pain, nausea, or vomiting. Watch closely for changes in your health, and be sure to contact your doctor if: 
· You do not get better as expected. Where can you learn more? Go to http://charles-kristin.info/. Enter V744 in the search box to learn more about \"Angioedema: Care Instructions. \" Current as of: February 12, 2016 Content Version: 11.1 © 3169-8949 [employer]. Care instructions adapted under license by [employer] (which disclaims liability or warranty for this information). If you have questions about a medical condition or this instruction, always ask your healthcare professional. [place] [address] any warranty or liability for your use of this information. Please provide this summary of care documentation to your next provider. Your primary care clinician is listed as Catrachito Anderson. If you have any questions after today's visit, please call [phone]. Applied

## 2025-07-02 ENCOUNTER — COMMUNITY OUTREACH (OUTPATIENT)
Dept: FAMILY MEDICINE CLINIC | Age: 75
End: 2025-07-02